# Patient Record
Sex: MALE | Race: WHITE | Employment: OTHER | URBAN - NONMETROPOLITAN AREA
[De-identification: names, ages, dates, MRNs, and addresses within clinical notes are randomized per-mention and may not be internally consistent; named-entity substitution may affect disease eponyms.]

---

## 2017-01-27 ENCOUNTER — OFFICE VISIT (OUTPATIENT)
Dept: ORTHOPEDIC SURGERY | Age: 67
End: 2017-01-27

## 2017-01-27 VITALS — BODY MASS INDEX: 40.63 KG/M2 | WEIGHT: 300 LBS | HEIGHT: 72 IN | RESPIRATION RATE: 16 BRPM

## 2017-01-27 DIAGNOSIS — R52 PAIN: ICD-10-CM

## 2017-01-27 DIAGNOSIS — M75.81 ROTATOR CUFF TENDONITIS, RIGHT: Primary | ICD-10-CM

## 2017-01-27 DIAGNOSIS — S46.911A STRAIN OF AC JOINT, RIGHT, INITIAL ENCOUNTER: ICD-10-CM

## 2017-01-27 PROBLEM — M75.80 ROTATOR CUFF TENDONITIS: Status: ACTIVE | Noted: 2017-01-27

## 2017-01-27 PROCEDURE — 99204 OFFICE O/P NEW MOD 45 MIN: CPT | Performed by: ORTHOPAEDIC SURGERY

## 2017-01-27 PROCEDURE — 20610 DRAIN/INJ JOINT/BURSA W/O US: CPT | Performed by: ORTHOPAEDIC SURGERY

## 2017-01-27 PROCEDURE — 73010 X-RAY EXAM OF SHOULDER BLADE: CPT | Performed by: ORTHOPAEDIC SURGERY

## 2017-01-27 PROCEDURE — 73030 X-RAY EXAM OF SHOULDER: CPT | Performed by: ORTHOPAEDIC SURGERY

## 2017-01-27 RX ORDER — TIZANIDINE 4 MG/1
4 TABLET ORAL 3 TIMES DAILY PRN
COMMUNITY

## 2017-01-27 RX ORDER — TRAZODONE HYDROCHLORIDE 100 MG/1
100 TABLET ORAL NIGHTLY
COMMUNITY

## 2017-01-27 RX ORDER — LEVOTHYROXINE SODIUM 0.15 MG/1
1 TABLET ORAL
COMMUNITY
End: 2017-01-27 | Stop reason: SDUPTHER

## 2017-01-27 RX ORDER — LORAZEPAM 1 MG/1
1 TABLET ORAL
COMMUNITY
End: 2017-01-27 | Stop reason: SDUPTHER

## 2017-01-27 RX ORDER — LOSARTAN POTASSIUM 50 MG/1
50 TABLET ORAL
COMMUNITY
Start: 2017-01-02

## 2017-01-27 RX ORDER — IPRATROPIUM BROMIDE AND ALBUTEROL SULFATE 2.5; .5 MG/3ML; MG/3ML
3 SOLUTION RESPIRATORY (INHALATION)
COMMUNITY
End: 2017-12-11 | Stop reason: SDUPTHER

## 2017-01-27 RX ORDER — MULTIVITAMIN
CAPSULE ORAL
COMMUNITY

## 2017-01-27 RX ORDER — ATORVASTATIN CALCIUM 40 MG/1
40 TABLET, FILM COATED ORAL
COMMUNITY
Start: 2017-01-02 | End: 2017-01-27 | Stop reason: SDUPTHER

## 2017-01-27 RX ORDER — MELOXICAM 15 MG/1
7.5 TABLET ORAL DAILY
COMMUNITY

## 2017-01-27 RX ORDER — OXYCODONE HYDROCHLORIDE 15 MG/1
20 TABLET ORAL
COMMUNITY
End: 2017-01-27 | Stop reason: SDUPTHER

## 2017-01-27 RX ORDER — ALBUTEROL SULFATE 90 UG/1
2 AEROSOL, METERED RESPIRATORY (INHALATION)
COMMUNITY

## 2017-01-27 RX ORDER — GABAPENTIN 600 MG/1
600 TABLET ORAL
Status: ON HOLD | COMMUNITY
End: 2018-05-29

## 2017-01-27 RX ORDER — NITROGLYCERIN 0.4 MG/1
1 TABLET SUBLINGUAL
COMMUNITY

## 2017-01-27 RX ORDER — DULOXETIN HYDROCHLORIDE 60 MG/1
60 CAPSULE, DELAYED RELEASE ORAL DAILY
COMMUNITY
Start: 2016-10-17

## 2017-01-27 RX ORDER — PROCHLORPERAZINE MALEATE 10 MG
10 TABLET ORAL
COMMUNITY
End: 2018-12-23

## 2017-01-27 RX ORDER — OMEPRAZOLE 20 MG/1
20 CAPSULE, DELAYED RELEASE ORAL
COMMUNITY
End: 2017-01-27 | Stop reason: SDUPTHER

## 2017-01-27 RX ORDER — LEVOTHYROXINE SODIUM 0.07 MG/1
75 TABLET ORAL
COMMUNITY

## 2017-01-27 RX ORDER — TRAZODONE HYDROCHLORIDE 50 MG/1
50 TABLET ORAL
COMMUNITY
Start: 2016-10-04 | End: 2017-01-27 | Stop reason: SDUPTHER

## 2017-01-27 RX ORDER — PREGABALIN 100 MG/1
100 CAPSULE ORAL 3 TIMES DAILY
COMMUNITY
End: 2018-12-23

## 2017-01-27 RX ORDER — GLIMEPIRIDE 4 MG/1
4 TABLET ORAL
COMMUNITY
End: 2017-01-27 | Stop reason: SDUPTHER

## 2017-01-27 RX ORDER — CITALOPRAM 20 MG/1
20 TABLET ORAL
COMMUNITY
Start: 2017-01-02 | End: 2017-01-27 | Stop reason: SDUPTHER

## 2017-01-27 RX ORDER — DICLOFENAC SODIUM 75 MG/1
75 TABLET, DELAYED RELEASE ORAL 2 TIMES DAILY
COMMUNITY
Start: 2017-01-02

## 2017-01-27 ASSESSMENT — ENCOUNTER SYMPTOMS
SHORTNESS OF BREATH: 1
CONSTIPATION: 1
PHOTOPHOBIA: 1
DIARRHEA: 1
COUGH: 1
BACK PAIN: 1
ABDOMINAL PAIN: 1
WHEEZING: 1

## 2017-02-02 ENCOUNTER — HOSPITAL ENCOUNTER (OUTPATIENT)
Dept: OCCUPATIONAL THERAPY | Age: 67
Discharge: HOME | End: 2017-02-28
Attending: ORTHOPAEDIC SURGERY | Admitting: ORTHOPAEDIC SURGERY

## 2017-02-08 ASSESSMENT — PAIN DESCRIPTION - PAIN TYPE: TYPE: CHRONIC PAIN

## 2017-02-08 ASSESSMENT — PAIN SCALES - GENERAL: PAINLEVEL_OUTOF10: 5

## 2017-03-01 ENCOUNTER — HOSPITAL ENCOUNTER (OUTPATIENT)
Dept: OCCUPATIONAL THERAPY | Age: 67
Discharge: HOME | End: 2017-03-31
Attending: ORTHOPAEDIC SURGERY | Admitting: ORTHOPAEDIC SURGERY

## 2017-08-07 LAB
AVERAGE GLUCOSE: NORMAL
HBA1C MFR BLD: 9.4 %

## 2017-10-17 ENCOUNTER — HOSPITAL ENCOUNTER (OUTPATIENT)
Dept: GENERAL RADIOLOGY | Age: 67
Discharge: HOME | End: 2017-10-17
Attending: PAIN MEDICINE | Admitting: PAIN MEDICINE

## 2017-10-17 DIAGNOSIS — M54.2 CERVICALGIA: ICD-10-CM

## 2017-12-01 ENCOUNTER — HOSPITAL ENCOUNTER (OUTPATIENT)
Dept: OTHER | Age: 67
Discharge: HOME | End: 2017-12-01
Attending: SKILLED NURSING FACILITY | Admitting: SKILLED NURSING FACILITY

## 2017-12-26 ENCOUNTER — OUTSIDE SERVICES (OUTPATIENT)
Dept: INTERNAL MEDICINE CLINIC | Age: 67
End: 2017-12-26

## 2017-12-26 DIAGNOSIS — E11.8 TYPE 2 DIABETES MELLITUS WITH COMPLICATION, WITH LONG-TERM CURRENT USE OF INSULIN (HCC): ICD-10-CM

## 2017-12-26 DIAGNOSIS — Z96.651 HISTORY OF TOTAL RIGHT KNEE REPLACEMENT: ICD-10-CM

## 2017-12-26 DIAGNOSIS — M17.11 ARTHRITIS OF RIGHT KNEE: Primary | ICD-10-CM

## 2017-12-26 DIAGNOSIS — Z95.0 CARDIAC PACEMAKER IN SITU: ICD-10-CM

## 2017-12-26 DIAGNOSIS — Z89.612 STATUS POST ABOVE KNEE AMPUTATION OF LEFT LOWER EXTREMITY: ICD-10-CM

## 2017-12-26 DIAGNOSIS — Z79.4 TYPE 2 DIABETES MELLITUS WITH COMPLICATION, WITH LONG-TERM CURRENT USE OF INSULIN (HCC): ICD-10-CM

## 2017-12-26 DIAGNOSIS — E11.40 TYPE 2 DIABETES MELLITUS WITH DIABETIC NEUROPATHY, UNSPECIFIED LONG TERM INSULIN USE STATUS: ICD-10-CM

## 2017-12-26 DIAGNOSIS — E66.01 MORBID OBESITY WITH BMI OF 40.0-44.9, ADULT (HCC): ICD-10-CM

## 2017-12-26 LAB
HCT VFR BLD CALC: 37.1 % (ref 42–52)
HEMOGLOBIN: 12.2 GM/DL (ref 13.5–18)
MCH RBC QN AUTO: 29.3 PG (ref 27–31)
MCHC RBC AUTO-ENTMCNC: 32.9 % (ref 32–36)
MCV RBC AUTO: 89 FL (ref 78–100)
PDW BLD-RTO: 13.5 % (ref 11.7–14.9)
PLATELET # BLD: 317 K/CU MM (ref 140–440)
PMV BLD AUTO: 9.7 FL (ref 7.5–11.1)
RBC # BLD: 4.17 M/CU MM (ref 4.6–6.2)
WBC # BLD: 9.9 K/CU MM (ref 4–10.5)

## 2017-12-26 PROCEDURE — 99306 1ST NF CARE HIGH MDM 50: CPT | Performed by: INTERNAL MEDICINE

## 2017-12-27 NOTE — PROGRESS NOTES
History and physical   ___________________________    Tawanna Conklin's  is 1950  SEEN ON 2017 at [facility]  ___________________________    S:   Patient is seen today and discussed with the nurses. 62-year-old white male was admitted to [facility] in [facility] for scheduled right knee arthroplasty on 2017. Patient tolerated the procedure well. He is sent to the [facility] center for rehab. Patient has diabetes mellitus on high-dose insulin, patient denies any hypoglycemia. He states his insulin has been adjusted recently. Patient denies any chest pain. No shortness of breath. No cough or sputum production. No nausea, vomiting or diarrhea. He has pain in the right knee at the surgical site that is controlled with pain medications. Patient has left above-the-knee amputation with prosthetic leg. ROS:  Denies any fever or chills  No sore throat. No earache. No headaches  No chest pain. No shortness of breath. No pedal edema  No cough or sputum production. No wheezing  No nausea, vomiting or abdominal pain. Right knee pain as mentioned above. Chronic numbness of the foot    ALLERGIES:  .  No known allergies      PAST MEDICAL HISTORY:  .  Diabetes mellitus on insulin  . Diabetic neuropathy  . Osteoarthritis right knee and status post TKA  . Hypertension  . Symptomatic bradycardia requiring permanent pacemaker  . Obstructive sleep apnea using CPAP  . Hypothyroidism  . GERD  . Hyperlipidemia  . Anxiety disorder  . Hyperlipidemia  . Depression        PAST SURGICAL HISTORY:  .  Left above-the-knee amputation in   . Laparoscopy cholecystectomy in   . ORIF of left ankle fracture   . Tonsillectomy  . Arthroscopic surgery of left shoulder in   . Right total knee arthroplasty 2070      SOCIAL HISTORY:  .   No history of smoking or ethanol use or illicit drugs Vital signs: /80. Pulse 76. RR 16.  O2 sat 96%. Afebrile  GENERAL:  Alert, oriented, pleasant, in no apparent distress. HEENT:  Conjunctiva pink, no scleral icterus. ENT clear. NECK:  Supple. No jugular venous distention noted. No masses felt,  CARDIOVASCULAR:  Normal S1 and S2  pacemaker on the left side  PULMONARY:  No respiratory distress. No wheezes or rales. ABDOMEN:  Soft and non-tender,no masses  or organomegaly. EXTREMITIES:  Left above-the-knee amputation. Scar on the right knee is healing. Mild edema of the right leg. No calf tenderness. SKIN: Skin is warm and dry. NEUROLOGICAL:  Cranial nerves II through XII are grossly intact. Assessment:  .  Right total knee arthroplasty on 12/20/2017  . Diabetes mellitus on insulin  . Left above-the-knee amputation old. Has prosthesis  . Hypertension  . Status post permanent pacemaker  . Obstructive sleep apnea on CPAP  . Hypothyroidism  . GERD  . Hyperlipidemia  . Anxiety disorder  . Depression  . Diabetic neuropathy  . Insomnia  . Asthma  . Obesity        Plan:  Patient is stable. Moving around in the wheelchair. Pain is in control with pain medications   Patient is also on insulin 70/30 which he takes 3 times a day with meals. Patient goes to diabetologist in [place]. He was taking about 50 units in a.m., 40 units at lunch and 30 units at dinner. The doses were adjusted recently. Check blood sugars regularly  DVT prophylaxis  Medications reviewed continue rest of the medications but  Medication were reviewed  Continue current treatment.

## 2018-01-01 ENCOUNTER — HOSPITAL ENCOUNTER (OUTPATIENT)
Dept: OTHER | Age: 68
Discharge: HOME | End: 2018-01-01
Attending: SKILLED NURSING FACILITY | Admitting: SKILLED NURSING FACILITY

## 2018-01-02 LAB
ALBUMIN SERPL-MCNC: 3.5 GM/DL (ref 3.4–5)
ALP BLD-CCNC: 104 IU/L (ref 40–129)
ALT SERPL-CCNC: 14 U/L (ref 10–40)
ANION GAP SERPL CALCULATED.3IONS-SCNC: 11 MMOL/L (ref 4–16)
AST SERPL-CCNC: 14 IU/L (ref 15–37)
BILIRUB SERPL-MCNC: 0.7 MG/DL (ref 0–1)
BUN BLDV-MCNC: 12 MG/DL (ref 6–23)
CALCIUM SERPL-MCNC: 9.6 MG/DL (ref 8.3–10.6)
CHLORIDE BLD-SCNC: 96 MMOL/L (ref 99–110)
CO2: 28 MMOL/L (ref 21–32)
CREAT SERPL-MCNC: 0.7 MG/DL (ref 0.9–1.3)
GFR AFRICAN AMERICAN: >60 ML/MIN/1.73M2
GFR NON-AFRICAN AMERICAN: >60 ML/MIN/1.73M2
GLUCOSE BLD-MCNC: 209 MG/DL (ref 70–99)
HCT VFR BLD CALC: 38.5 % (ref 42–52)
HEMOGLOBIN: 12.8 GM/DL (ref 13.5–18)
MCH RBC QN AUTO: 29 PG (ref 27–31)
MCHC RBC AUTO-ENTMCNC: 33.2 % (ref 32–36)
MCV RBC AUTO: 87.3 FL (ref 78–100)
PDW BLD-RTO: 13.1 % (ref 11.7–14.9)
PLATELET # BLD: 503 K/CU MM (ref 140–440)
PMV BLD AUTO: 8.9 FL (ref 7.5–11.1)
POTASSIUM SERPL-SCNC: 4.6 MMOL/L (ref 3.5–5.1)
RBC # BLD: 4.41 M/CU MM (ref 4.6–6.2)
SODIUM BLD-SCNC: 135 MMOL/L (ref 135–145)
TOTAL PROTEIN: 6.8 GM/DL (ref 6.4–8.2)
WBC # BLD: 10.6 K/CU MM (ref 4–10.5)

## 2018-01-04 ENCOUNTER — OUTSIDE SERVICES (OUTPATIENT)
Dept: INTERNAL MEDICINE CLINIC | Age: 68
End: 2018-01-04

## 2018-01-04 DIAGNOSIS — E11.40 TYPE 2 DIABETES MELLITUS WITH DIABETIC NEUROPATHY, UNSPECIFIED LONG TERM INSULIN USE STATUS: ICD-10-CM

## 2018-01-04 DIAGNOSIS — F33.9 RECURRENT MAJOR DEPRESSIVE DISORDER, REMISSION STATUS UNSPECIFIED (HCC): ICD-10-CM

## 2018-01-04 DIAGNOSIS — Z89.612 STATUS POST ABOVE KNEE AMPUTATION OF LEFT LOWER EXTREMITY: ICD-10-CM

## 2018-01-04 DIAGNOSIS — Z96.651 STATUS POST TOTAL RIGHT KNEE REPLACEMENT: Primary | ICD-10-CM

## 2018-01-04 PROCEDURE — 99308 SBSQ NF CARE LOW MDM 20: CPT | Performed by: INTERNAL MEDICINE

## 2018-01-09 LAB
HCT VFR BLD CALC: 39.6 % (ref 42–52)
HEMOGLOBIN: 13.1 GM/DL (ref 13.5–18)
MCH RBC QN AUTO: 28.5 PG (ref 27–31)
MCHC RBC AUTO-ENTMCNC: 33.1 % (ref 32–36)
MCV RBC AUTO: 86.3 FL (ref 78–100)
PDW BLD-RTO: 12.9 % (ref 11.7–14.9)
PLATELET # BLD: 522 K/CU MM (ref 140–440)
PMV BLD AUTO: 8.7 FL (ref 7.5–11.1)
RBC # BLD: 4.59 M/CU MM (ref 4.6–6.2)
WBC # BLD: 9.9 K/CU MM (ref 4–10.5)

## 2018-01-09 PROCEDURE — 99308 SBSQ NF CARE LOW MDM 20: CPT | Performed by: INTERNAL MEDICINE

## 2018-01-10 ENCOUNTER — OUTSIDE SERVICES (OUTPATIENT)
Dept: INTERNAL MEDICINE CLINIC | Age: 68
End: 2018-01-10

## 2018-01-10 DIAGNOSIS — J44.9 CHRONIC OBSTRUCTIVE PULMONARY DISEASE, UNSPECIFIED COPD TYPE (HCC): ICD-10-CM

## 2018-01-10 DIAGNOSIS — E11.8 TYPE 2 DIABETES MELLITUS WITH COMPLICATION, WITH LONG-TERM CURRENT USE OF INSULIN (HCC): Primary | ICD-10-CM

## 2018-01-10 DIAGNOSIS — Z79.4 TYPE 2 DIABETES MELLITUS WITH COMPLICATION, WITH LONG-TERM CURRENT USE OF INSULIN (HCC): Primary | ICD-10-CM

## 2018-01-10 DIAGNOSIS — Z89.612 STATUS POST ABOVE KNEE AMPUTATION OF LEFT LOWER EXTREMITY: ICD-10-CM

## 2018-01-10 DIAGNOSIS — Z96.651 STATUS POST TOTAL RIGHT KNEE REPLACEMENT: ICD-10-CM

## 2018-01-11 NOTE — PROGRESS NOTES
Progress notes  ___________________________    Hermelinda Conklin's  is 1950  SEEN ON 2018 at [facility]  ___________________________    S:   Patient is seen today and discussed with the nurses. Pt had  right knee arthroplasty on 2017. Doing rehab. Pain is in control. Patient has left above-the-knee amputation with prosthetic leg. Patient states he got tired after exercise. His BS are running low at times. He is requesting for smaller doses of Insulin. Lowest BS is 56  Patient denies any chest pain. No shortness of breath. No cough or sputum production. ALLERGIES:  .  No known allergies      PAST MEDICAL HISTORY:  .  Diabetes mellitus on insulin  . Diabetic neuropathy  . Osteoarthritis right knee and status post TKA  . Hypertension  . Symptomatic bradycardia requiring permanent pacemaker  . Obstructive sleep apnea using CPAP  . Hypothyroidism  . GERD  . Hyperlipidemia  . Anxiety disorder  . Hyperlipidemia  . Depression        PAST SURGICAL HISTORY:  .  Left above-the-knee amputation in   . Laparoscopy cholecystectomy in   . ORIF of left ankle fracture   . Tonsillectomy  . Arthroscopic surgery of left shoulder in   . Right total knee arthroplasty 2070      SOCIAL HISTORY:  . No history of smoking or ethanol use or illicit drugs        Vital signs: /80, P 61, T 98.5 RR 20, O2 95%  GENERAL:  Alert, oriented, pleasant, in no apparent distress. HEENT:  Conjunctiva pink, no scleral icterus. ENT clear. NECK:  Supple. No jugular venous distention noted. No masses felt,  CARDIOVASCULAR:  Normal S1 and S2  pacemaker on the left side  PULMONARY:  No respiratory distress. No wheezes or rales. ABDOMEN:  Soft and non-tender,no masses  or organomegaly. EXTREMITIES:  Left above-the-knee amputation. Scar on the right knee is healing.  No calf

## 2018-01-16 LAB
HCT VFR BLD CALC: 38.6 % (ref 42–52)
HEMOGLOBIN: 12.9 GM/DL (ref 13.5–18)
MCH RBC QN AUTO: 28.7 PG (ref 27–31)
MCHC RBC AUTO-ENTMCNC: 33.4 % (ref 32–36)
MCV RBC AUTO: 86 FL (ref 78–100)
PDW BLD-RTO: 13.2 % (ref 11.7–14.9)
PLATELET # BLD: 470 K/CU MM (ref 140–440)
PMV BLD AUTO: 9.1 FL (ref 7.5–11.1)
RBC # BLD: 4.49 M/CU MM (ref 4.6–6.2)
WBC # BLD: 9 K/CU MM (ref 4–10.5)

## 2018-05-29 PROBLEM — G93.41 ACUTE METABOLIC ENCEPHALOPATHY: Status: ACTIVE | Noted: 2018-05-29

## 2018-06-19 ENCOUNTER — HOSPITAL ENCOUNTER (OUTPATIENT)
Dept: GENERAL RADIOLOGY | Age: 68
Discharge: HOME | End: 2018-06-19
Attending: NURSE PRACTITIONER | Admitting: NURSE PRACTITIONER

## 2018-06-19 LAB
ALT SERPL-CCNC: 17 U/L (ref 10–40)
ANION GAP SERPL CALCULATED.3IONS-SCNC: 15 MMOL/L (ref 4–16)
BASOPHILS ABSOLUTE: 0.1 K/CU MM
BASOPHILS RELATIVE PERCENT: 0.8 % (ref 0–1)
BUN BLDV-MCNC: 12 MG/DL (ref 6–23)
CALCIUM SERPL-MCNC: 9.4 MG/DL (ref 8.3–10.6)
CHLORIDE BLD-SCNC: 98 MMOL/L (ref 99–110)
CHOLESTEROL: 200 MG/DL
CO2: 23 MMOL/L (ref 21–32)
CREAT SERPL-MCNC: 1.1 MG/DL (ref 0.9–1.3)
DIFFERENTIAL TYPE: ABNORMAL
EOSINOPHILS ABSOLUTE: 0.5 K/CU MM
EOSINOPHILS RELATIVE PERCENT: 6.1 % (ref 0–3)
ESTIMATED AVERAGE GLUCOSE: 232 MG/DL
GFR AFRICAN AMERICAN: >60 ML/MIN/1.73M2
GFR NON-AFRICAN AMERICAN: >60 ML/MIN/1.73M2
GLUCOSE BLD-MCNC: 79 MG/DL (ref 70–99)
HBA1C MFR BLD: 9.7 % (ref 4.2–6.3)
HCT VFR BLD CALC: 46.3 % (ref 42–52)
HDLC SERPL-MCNC: 48 MG/DL
HEMOGLOBIN: 14.9 GM/DL (ref 13.5–18)
IMMATURE NEUTROPHIL %: 0.3 % (ref 0–0.43)
LDL CHOLESTEROL DIRECT: 159 MG/DL
LYMPHOCYTES ABSOLUTE: 1.4 K/CU MM
LYMPHOCYTES RELATIVE PERCENT: 19.3 % (ref 24–44)
MCH RBC QN AUTO: 29.1 PG (ref 27–31)
MCHC RBC AUTO-ENTMCNC: 32.2 % (ref 32–36)
MCV RBC AUTO: 90.4 FL (ref 78–100)
MONOCYTES ABSOLUTE: 0.8 K/CU MM
MONOCYTES RELATIVE PERCENT: 10 % (ref 0–4)
NUCLEATED RBC %: 0 %
PDW BLD-RTO: 14.9 % (ref 11.7–14.9)
PLATELET # BLD: 310 K/CU MM (ref 140–440)
PMV BLD AUTO: 9.8 FL (ref 7.5–11.1)
POTASSIUM SERPL-SCNC: 4.1 MMOL/L (ref 3.5–5.1)
PROSTATE SPECIFIC ANTIGEN: 0.82 NG/ML (ref 0–4)
RBC # BLD: 5.12 M/CU MM (ref 4.6–6.2)
SEGMENTED NEUTROPHILS ABSOLUTE COUNT: 4.8 K/CU MM
SEGMENTED NEUTROPHILS RELATIVE PERCENT: 63.5 % (ref 36–66)
SODIUM BLD-SCNC: 136 MMOL/L (ref 135–145)
TOTAL IMMATURE NEUTOROPHIL: 0.02 K/CU MM
TOTAL NUCLEATED RBC: 0 K/CU MM
TRIGL SERPL-MCNC: 133 MG/DL
TSH HIGH SENSITIVITY: 103.1 UIU/ML (ref 0.27–4.2)
WBC # BLD: 7.5 K/CU MM (ref 4–10.5)

## 2018-08-31 PROBLEM — H25.11 AGE-RELATED NUCLEAR CATARACT OF RIGHT EYE: Status: ACTIVE | Noted: 2018-08-31

## 2018-11-06 ENCOUNTER — HOSPITAL ENCOUNTER (EMERGENCY)
Age: 68
Discharge: HOME | End: 2018-11-06
Attending: EMERGENCY MEDICINE
Payer: MEDICARE

## 2018-11-06 ENCOUNTER — APPOINTMENT (OUTPATIENT)
Dept: GENERAL RADIOLOGY | Age: 68
End: 2018-11-06
Payer: MEDICARE

## 2018-11-06 VITALS
OXYGEN SATURATION: 93 % | SYSTOLIC BLOOD PRESSURE: 133 MMHG | TEMPERATURE: 98.2 F | HEART RATE: 91 BPM | RESPIRATION RATE: 20 BRPM | DIASTOLIC BLOOD PRESSURE: 58 MMHG | WEIGHT: 280 LBS | HEIGHT: 73 IN | BODY MASS INDEX: 37.11 KG/M2

## 2018-11-06 DIAGNOSIS — R07.9 CHEST PAIN, UNSPECIFIED TYPE: ICD-10-CM

## 2018-11-06 DIAGNOSIS — R42 LIGHTHEADEDNESS: Primary | ICD-10-CM

## 2018-11-06 LAB
ALBUMIN SERPL-MCNC: 3.9 GM/DL (ref 3.4–5)
ALP BLD-CCNC: 102 IU/L (ref 40–129)
ALT SERPL-CCNC: 18 U/L (ref 10–40)
ANION GAP SERPL CALCULATED.3IONS-SCNC: 14 MMOL/L (ref 4–16)
AST SERPL-CCNC: 18 IU/L (ref 15–37)
BASOPHILS ABSOLUTE: 0.1 K/CU MM
BASOPHILS RELATIVE PERCENT: 0.8 % (ref 0–1)
BILIRUB SERPL-MCNC: 0.3 MG/DL (ref 0–1)
BUN BLDV-MCNC: 12 MG/DL (ref 6–23)
CALCIUM SERPL-MCNC: 9.7 MG/DL (ref 8.3–10.6)
CHLORIDE BLD-SCNC: 101 MMOL/L (ref 99–110)
CO2: 23 MMOL/L (ref 21–32)
CREAT SERPL-MCNC: 0.8 MG/DL (ref 0.9–1.3)
DIFFERENTIAL TYPE: ABNORMAL
EOSINOPHILS ABSOLUTE: 0.3 K/CU MM
EOSINOPHILS RELATIVE PERCENT: 2.9 % (ref 0–3)
GFR AFRICAN AMERICAN: >60 ML/MIN/1.73M2
GFR NON-AFRICAN AMERICAN: >60 ML/MIN/1.73M2
GLUCOSE BLD-MCNC: 133 MG/DL (ref 70–99)
HCT VFR BLD CALC: 47.7 % (ref 42–52)
HEMOGLOBIN: 15.7 GM/DL (ref 13.5–18)
IMMATURE NEUTROPHIL %: 0.2 % (ref 0–0.43)
INR BLD: 1.03 INDEX
LYMPHOCYTES ABSOLUTE: 2 K/CU MM
LYMPHOCYTES RELATIVE PERCENT: 21.4 % (ref 24–44)
MCH RBC QN AUTO: 28.9 PG (ref 27–31)
MCHC RBC AUTO-ENTMCNC: 32.9 % (ref 32–36)
MCV RBC AUTO: 87.8 FL (ref 78–100)
MONOCYTES ABSOLUTE: 1 K/CU MM
MONOCYTES RELATIVE PERCENT: 10.7 % (ref 0–4)
NUCLEATED RBC %: 0 %
PDW BLD-RTO: 12.9 % (ref 11.7–14.9)
PLATELET # BLD: 305 K/CU MM (ref 140–440)
PMV BLD AUTO: 9.4 FL (ref 7.5–11.1)
POTASSIUM SERPL-SCNC: 3.8 MMOL/L (ref 3.5–5.1)
PRO-BNP: 97.16 PG/ML
PROTHROMBIN TIME: 12 SECONDS (ref 9.12–12.5)
RBC # BLD: 5.43 M/CU MM (ref 4.6–6.2)
SEGMENTED NEUTROPHILS ABSOLUTE COUNT: 5.9 K/CU MM
SEGMENTED NEUTROPHILS RELATIVE PERCENT: 64 % (ref 36–66)
SODIUM BLD-SCNC: 138 MMOL/L (ref 135–145)
TOTAL IMMATURE NEUTOROPHIL: 0.02 K/CU MM
TOTAL NUCLEATED RBC: 0 K/CU MM
TOTAL PROTEIN: 7.2 GM/DL (ref 6.4–8.2)
TROPONIN T: <0.01 NG/ML
TROPONIN T: <0.01 NG/ML
WBC # BLD: 9.2 K/CU MM (ref 4–10.5)

## 2018-11-06 PROCEDURE — 93005 ELECTROCARDIOGRAM TRACING: CPT | Performed by: EMERGENCY MEDICINE

## 2018-11-06 PROCEDURE — 96360 HYDRATION IV INFUSION INIT: CPT

## 2018-11-06 PROCEDURE — 71046 X-RAY EXAM CHEST 2 VIEWS: CPT

## 2018-11-06 PROCEDURE — 84484 ASSAY OF TROPONIN QUANT: CPT

## 2018-11-06 PROCEDURE — 36415 COLL VENOUS BLD VENIPUNCTURE: CPT

## 2018-11-06 PROCEDURE — 85025 COMPLETE CBC W/AUTO DIFF WBC: CPT

## 2018-11-06 PROCEDURE — 83880 ASSAY OF NATRIURETIC PEPTIDE: CPT

## 2018-11-06 PROCEDURE — 99284 EMERGENCY DEPT VISIT MOD MDM: CPT

## 2018-11-06 PROCEDURE — 96361 HYDRATE IV INFUSION ADD-ON: CPT

## 2018-11-06 PROCEDURE — 80053 COMPREHEN METABOLIC PANEL: CPT

## 2018-11-06 PROCEDURE — 2580000003 HC RX 258: Performed by: PHYSICIAN ASSISTANT

## 2018-11-06 PROCEDURE — 85610 PROTHROMBIN TIME: CPT

## 2018-11-06 RX ORDER — 0.9 % SODIUM CHLORIDE 0.9 %
1000 INTRAVENOUS SOLUTION INTRAVENOUS ONCE
Status: COMPLETED | OUTPATIENT
Start: 2018-11-06 | End: 2018-11-06

## 2018-11-06 RX ADMIN — SODIUM CHLORIDE 1000 ML: 900 INJECTION INTRAVENOUS at 20:28

## 2018-11-07 LAB
EKG ATRIAL RATE: 82 BPM
EKG DIAGNOSIS: NORMAL
EKG P AXIS: -11 DEGREES
EKG P-R INTERVAL: 122 MS
EKG Q-T INTERVAL: 452 MS
EKG QRS DURATION: 146 MS
EKG QTC CALCULATION (BAZETT): 528 MS
EKG R AXIS: 177 DEGREES
EKG T AXIS: 73 DEGREES
EKG VENTRICULAR RATE: 82 BPM

## 2018-11-07 PROCEDURE — 93010 ELECTROCARDIOGRAM REPORT: CPT | Performed by: INTERNAL MEDICINE

## 2018-11-07 NOTE — ED PROVIDER NOTES
eMERGENCY dEPARTMENT eNCOUnter        [facility]    Chief Complaint   Patient presents with    Dizziness       HPI    Vinod Sanches is a 79 y.o. male who presents with complaint of hypotension. Patient states he was at home and felt very tired, \"like I was going to fall asleep,\" so he called EMS. His BP was 88F systolic upon their arrival.  He denies any syncope. Denies any headache. Denies room spinning sensation. Denies numbness or tingling. Denies weakness in the arms or legs. He states he has had intermittent chest pain over the last 2 days--once to the left chest and other times to the right chest.  Brief episodes, none at present. Denies shortness of breath, cough, or congestion. He has a pacemaker. Denies abd pain, n/v/d. REVIEW OF SYSTEMS    Constitutional:  Denies fever, denies diaphoresis. HENT:  Denies headache, + dizziness/lightheadedness. Respiratory:  Denies shortness of breath, denies cough. Cardiovascular:  + chest pain. GI:  Denies abdominal pain, denies nausea/vomiting. :  Denies dysuria, frequency, urgency, urinary incontinence. Musculoskeletal:  Denies extremity swelling/pain. Integument:  Denies rashes, lesions. Neurologic:  Denies numbness/tingling. All other systems reviewed and negative.     PAST MEDICAL & SURGICAL HISTORY    Past Medical History:   Diagnosis Date    Acid reflux     Back pain     Blood transfusion     Bone spur     \"Back and Shoulder\"    Cataract     Chronic back pain     [facility]) [facility]     Diabetic eye exam ([facility] 3/1/16    Difficulty breathing     DJD (degenerative joint disease)     Edema     Hyperlipidemia     Hypertension     \"Not on medication at this time\"    Hypothyroid     \"Low Thyroid\"    Neck pain     Osteoarthritis     Shortness of breath     Sinus problem     Sleep apnea     Uses CPAP Machine    Type II or unspecified type diabetes mellitus without mention of metFORMIN (GLUCOPHAGE) 1000 MG tablet Take 1,000 mg by mouth      prochlorperazine (COMPAZINE) 10 MG tablet Take 10 mg by mouth      tiZANidine (ZANAFLEX) 4 MG tablet Take 4 mg by mouth 3 times daily as needed       nitroGLYCERIN (NITROSTAT) 0.4 MG SL tablet Place 1 tablet under the tongue      pregabalin (LYRICA) 100 MG capsule Take 100 mg by mouth 3 times daily. Shelvy Frantz traZODone (DESYREL) 100 MG tablet Take 100 mg by mouth nightly      CPAP Machine MISC by Does not apply route      mometasone-formoterol (DULERA) 200-5 MCG/ACT inhaler Inhale 2 puffs into the lungs every 12 hours      citalopram (CELEXA) 20 MG tablet TAKE ONE TABLET BY MOUTH ONCE DAILY 90 tablet 3    atorvastatin (LIPITOR) 40 MG tablet Take 1 tablet by mouth daily 90 tablet 3    omeprazole (PRILOSEC) 20 MG capsule TAKE ONE CAPSULE BY MOUTH ONCE DAILY, AS DIRECTED 90 capsule 3    glimepiride (AMARYL) 4 MG tablet Take 1 tablet by mouth 2 times daily 180 tablet 3    Melatonin 10 MG TABS Take 1 tablet by mouth nightly      albuterol (PROVENTIL HFA) 108 (90 BASE) MCG/ACT inhaler Inhale 2 puffs into the lungs every 4 hours as needed for Wheezing or Shortness of Breath With spacer (and mask if indicated). Thanks. 1 Inhaler 3    Respiratory Therapy Supplies (FULL KIT NEBULIZER SET) MISC 1 Device by Does not apply route every 4 hours 1 each 0    montelukast (SINGULAIR) 10 MG tablet Take 1 tablet by mouth nightly 30 tablet 3    LORazepam (ATIVAN) 1 MG tablet Take 1 tablet by mouth nightly as needed.  100 tablet 1    ASPIRIN   Take 81 mg by mouth daily In Morning         ALLERGIES    Allergies   Allergen Reactions    No Known Allergies        SOCIAL & FAMILY HISTORY    Social History     Social History    Marital status: Legally      Spouse name: N/A    Number of children: N/A    Years of education: N/A     Social History Main Topics    Smoking status: Former Smoker     Types: Cigars     Quit date: 10/10/2008    Smokeless

## 2018-11-30 ENCOUNTER — HOSPITAL ENCOUNTER (OUTPATIENT)
Age: 68
Discharge: HOME | End: 2018-11-30
Payer: MEDICARE

## 2018-11-30 ENCOUNTER — HOSPITAL ENCOUNTER (OUTPATIENT)
Dept: GENERAL RADIOLOGY | Age: 68
Discharge: HOME | End: 2018-11-30
Payer: MEDICARE

## 2018-11-30 DIAGNOSIS — M46.1 BILATERAL SACROILIITIS (HCC): ICD-10-CM

## 2018-11-30 PROCEDURE — 72110 X-RAY EXAM L-2 SPINE 4/>VWS: CPT

## 2018-11-30 PROCEDURE — 72220 X-RAY EXAM SACRUM TAILBONE: CPT

## 2018-11-30 PROCEDURE — 72202 X-RAY EXAM SI JOINTS 3/> VWS: CPT

## 2018-11-30 PROCEDURE — 72170 X-RAY EXAM OF PELVIS: CPT

## 2018-12-23 ENCOUNTER — HOSPITAL ENCOUNTER (OUTPATIENT)
Age: 68
Setting detail: OBSERVATION
Discharge: HOME HEALTH | End: 2018-12-24
Attending: EMERGENCY MEDICINE | Admitting: HOSPITALIST
Payer: MEDICARE

## 2018-12-23 DIAGNOSIS — I95.9 HYPOTENSION, UNSPECIFIED HYPOTENSION TYPE: ICD-10-CM

## 2018-12-23 DIAGNOSIS — T50.901A ACCIDENTAL DRUG OVERDOSE, INITIAL ENCOUNTER: Primary | ICD-10-CM

## 2018-12-23 PROBLEM — E03.9 HYPOTHYROIDISM: Status: ACTIVE | Noted: 2018-12-23

## 2018-12-23 PROBLEM — E11.9 TYPE 2 DIABETES MELLITUS (HCC): Status: ACTIVE | Noted: 2018-12-23

## 2018-12-23 PROBLEM — J44.9 COPD (CHRONIC OBSTRUCTIVE PULMONARY DISEASE) (HCC): Status: ACTIVE | Noted: 2018-12-23

## 2018-12-23 PROBLEM — E78.5 HYPERLIPIDEMIA: Status: ACTIVE | Noted: 2018-12-23

## 2018-12-23 LAB
ACETAMINOPHEN LEVEL: <5 UG/ML (ref 15–30)
ALBUMIN SERPL-MCNC: 4.4 GM/DL (ref 3.4–5)
ALP BLD-CCNC: 117 IU/L (ref 40–129)
ALT SERPL-CCNC: 22 U/L (ref 10–40)
ANION GAP SERPL CALCULATED.3IONS-SCNC: 12 MMOL/L (ref 4–16)
AST SERPL-CCNC: 22 IU/L (ref 15–37)
BASOPHILS ABSOLUTE: 0.1 K/CU MM
BASOPHILS RELATIVE PERCENT: 0.6 % (ref 0–1)
BILIRUB SERPL-MCNC: 0.3 MG/DL (ref 0–1)
BUN BLDV-MCNC: 19 MG/DL (ref 6–23)
CALCIUM SERPL-MCNC: 10.4 MG/DL (ref 8.3–10.6)
CHLORIDE BLD-SCNC: 100 MMOL/L (ref 99–110)
CO2: 26 MMOL/L (ref 21–32)
CREAT SERPL-MCNC: 0.9 MG/DL (ref 0.9–1.3)
DIFFERENTIAL TYPE: ABNORMAL
EOSINOPHILS ABSOLUTE: 0.4 K/CU MM
EOSINOPHILS RELATIVE PERCENT: 3.1 % (ref 0–3)
GFR AFRICAN AMERICAN: >60 ML/MIN/1.73M2
GFR NON-AFRICAN AMERICAN: >60 ML/MIN/1.73M2
GLUCOSE BLD-MCNC: 150 MG/DL (ref 70–99)
GLUCOSE BLD-MCNC: 298 MG/DL (ref 70–99)
HCT VFR BLD CALC: 46.5 % (ref 42–52)
HEMOGLOBIN: 15.1 GM/DL (ref 13.5–18)
IMMATURE NEUTROPHIL %: 0.3 % (ref 0–0.43)
LYMPHOCYTES ABSOLUTE: 1.7 K/CU MM
LYMPHOCYTES RELATIVE PERCENT: 14.8 % (ref 24–44)
MCH RBC QN AUTO: 28.5 PG (ref 27–31)
MCHC RBC AUTO-ENTMCNC: 32.5 % (ref 32–36)
MCV RBC AUTO: 87.7 FL (ref 78–100)
MONOCYTES ABSOLUTE: 1 K/CU MM
MONOCYTES RELATIVE PERCENT: 8.9 % (ref 0–4)
PDW BLD-RTO: 13.8 % (ref 11.7–14.9)
PLATELET # BLD: 344 K/CU MM (ref 140–440)
PMV BLD AUTO: 9.4 FL (ref 7.5–11.1)
POTASSIUM SERPL-SCNC: 4.5 MMOL/L (ref 3.5–5.1)
RBC # BLD: 5.3 M/CU MM (ref 4.6–6.2)
SEGMENTED NEUTROPHILS ABSOLUTE COUNT: 8.4 K/CU MM
SEGMENTED NEUTROPHILS RELATIVE PERCENT: 72.3 % (ref 36–66)
SODIUM BLD-SCNC: 138 MMOL/L (ref 135–145)
TOTAL IMMATURE NEUTOROPHIL: 0.03 K/CU MM
TOTAL PROTEIN: 7.1 GM/DL (ref 6.4–8.2)
WBC # BLD: 11.6 K/CU MM (ref 4–10.5)

## 2018-12-23 PROCEDURE — 2580000003 HC RX 258: Performed by: NURSE PRACTITIONER

## 2018-12-23 PROCEDURE — G0480 DRUG TEST DEF 1-7 CLASSES: HCPCS

## 2018-12-23 PROCEDURE — 96374 THER/PROPH/DIAG INJ IV PUSH: CPT

## 2018-12-23 PROCEDURE — 96361 HYDRATE IV INFUSION ADD-ON: CPT

## 2018-12-23 PROCEDURE — 99285 EMERGENCY DEPT VISIT HI MDM: CPT

## 2018-12-23 PROCEDURE — 6360000002 HC RX W HCPCS: Performed by: HOSPITALIST

## 2018-12-23 PROCEDURE — 6370000000 HC RX 637 (ALT 250 FOR IP): Performed by: NURSE PRACTITIONER

## 2018-12-23 PROCEDURE — 94660 CPAP INITIATION&MGMT: CPT

## 2018-12-23 PROCEDURE — 93005 ELECTROCARDIOGRAM TRACING: CPT | Performed by: EMERGENCY MEDICINE

## 2018-12-23 PROCEDURE — 82962 GLUCOSE BLOOD TEST: CPT

## 2018-12-23 PROCEDURE — 6360000002 HC RX W HCPCS: Performed by: NURSE PRACTITIONER

## 2018-12-23 PROCEDURE — G0378 HOSPITAL OBSERVATION PER HR: HCPCS

## 2018-12-23 PROCEDURE — 2580000003 HC RX 258: Performed by: HOSPITALIST

## 2018-12-23 PROCEDURE — 96372 THER/PROPH/DIAG INJ SC/IM: CPT

## 2018-12-23 PROCEDURE — 94640 AIRWAY INHALATION TREATMENT: CPT

## 2018-12-23 PROCEDURE — 93010 ELECTROCARDIOGRAM REPORT: CPT | Performed by: INTERNAL MEDICINE

## 2018-12-23 PROCEDURE — 85025 COMPLETE CBC W/AUTO DIFF WBC: CPT

## 2018-12-23 PROCEDURE — 2580000003 HC RX 258: Performed by: EMERGENCY MEDICINE

## 2018-12-23 PROCEDURE — 80053 COMPREHEN METABOLIC PANEL: CPT

## 2018-12-23 RX ORDER — LEVOTHYROXINE SODIUM 0.07 MG/1
75 TABLET ORAL DAILY
Status: DISCONTINUED | OUTPATIENT
Start: 2018-12-24 | End: 2018-12-24 | Stop reason: HOSPADM

## 2018-12-23 RX ORDER — FUROSEMIDE 20 MG/1
20 TABLET ORAL DAILY
COMMUNITY

## 2018-12-23 RX ORDER — ASPIRIN 81 MG/1
81 TABLET, CHEWABLE ORAL DAILY
Status: DISCONTINUED | OUTPATIENT
Start: 2018-12-24 | End: 2018-12-24 | Stop reason: HOSPADM

## 2018-12-23 RX ORDER — DULOXETIN HYDROCHLORIDE 30 MG/1
60 CAPSULE, DELAYED RELEASE ORAL DAILY
Status: DISCONTINUED | OUTPATIENT
Start: 2018-12-24 | End: 2018-12-24 | Stop reason: HOSPADM

## 2018-12-23 RX ORDER — SODIUM CHLORIDE 0.9 % (FLUSH) 0.9 %
10 SYRINGE (ML) INJECTION EVERY 12 HOURS SCHEDULED
Status: DISCONTINUED | OUTPATIENT
Start: 2018-12-23 | End: 2018-12-24 | Stop reason: HOSPADM

## 2018-12-23 RX ORDER — SODIUM CHLORIDE 0.9 % (FLUSH) 0.9 %
10 SYRINGE (ML) INJECTION PRN
Status: DISCONTINUED | OUTPATIENT
Start: 2018-12-23 | End: 2018-12-24 | Stop reason: HOSPADM

## 2018-12-23 RX ORDER — LANOLIN ALCOHOL/MO/W.PET/CERES
9 CREAM (GRAM) TOPICAL NIGHTLY
Status: DISCONTINUED | OUTPATIENT
Start: 2018-12-23 | End: 2018-12-24 | Stop reason: HOSPADM

## 2018-12-23 RX ORDER — LOSARTAN POTASSIUM 50 MG/1
50 TABLET ORAL DAILY
Status: DISCONTINUED | OUTPATIENT
Start: 2018-12-24 | End: 2018-12-24 | Stop reason: HOSPADM

## 2018-12-23 RX ORDER — NITROGLYCERIN 0.4 MG/1
0.4 TABLET SUBLINGUAL EVERY 5 MIN PRN
Status: DISCONTINUED | OUTPATIENT
Start: 2018-12-23 | End: 2018-12-24 | Stop reason: HOSPADM

## 2018-12-23 RX ORDER — CITALOPRAM 20 MG/1
20 TABLET ORAL DAILY
Status: DISCONTINUED | OUTPATIENT
Start: 2018-12-24 | End: 2018-12-24 | Stop reason: HOSPADM

## 2018-12-23 RX ORDER — DEXTROSE MONOHYDRATE 50 MG/ML
100 INJECTION, SOLUTION INTRAVENOUS PRN
Status: DISCONTINUED | OUTPATIENT
Start: 2018-12-23 | End: 2018-12-24 | Stop reason: HOSPADM

## 2018-12-23 RX ORDER — ONDANSETRON 2 MG/ML
4 INJECTION INTRAMUSCULAR; INTRAVENOUS EVERY 6 HOURS PRN
Status: DISCONTINUED | OUTPATIENT
Start: 2018-12-23 | End: 2018-12-24 | Stop reason: HOSPADM

## 2018-12-23 RX ORDER — FUROSEMIDE 20 MG/1
20 TABLET ORAL DAILY
Status: DISCONTINUED | OUTPATIENT
Start: 2018-12-24 | End: 2018-12-24 | Stop reason: HOSPADM

## 2018-12-23 RX ORDER — DEXTROSE MONOHYDRATE 25 G/50ML
12.5 INJECTION, SOLUTION INTRAVENOUS PRN
Status: DISCONTINUED | OUTPATIENT
Start: 2018-12-23 | End: 2018-12-24 | Stop reason: HOSPADM

## 2018-12-23 RX ORDER — IBUPROFEN 800 MG/1
800 TABLET ORAL
Status: DISCONTINUED | OUTPATIENT
Start: 2018-12-23 | End: 2018-12-24 | Stop reason: HOSPADM

## 2018-12-23 RX ORDER — M-VIT,TX,IRON,MINS/CALC/FOLIC 27MG-0.4MG
1 TABLET ORAL DAILY
Status: DISCONTINUED | OUTPATIENT
Start: 2018-12-24 | End: 2018-12-24 | Stop reason: HOSPADM

## 2018-12-23 RX ORDER — LORAZEPAM 1 MG/1
1 TABLET ORAL NIGHTLY PRN
Status: DISCONTINUED | OUTPATIENT
Start: 2018-12-23 | End: 2018-12-24 | Stop reason: HOSPADM

## 2018-12-23 RX ORDER — GLIMEPIRIDE 2 MG/1
4 TABLET ORAL 2 TIMES DAILY WITH MEALS
Status: DISCONTINUED | OUTPATIENT
Start: 2018-12-23 | End: 2018-12-24 | Stop reason: HOSPADM

## 2018-12-23 RX ORDER — MELOXICAM 7.5 MG/1
7.5 TABLET ORAL DAILY
Status: DISCONTINUED | OUTPATIENT
Start: 2018-12-24 | End: 2018-12-24 | Stop reason: HOSPADM

## 2018-12-23 RX ORDER — ALBUTEROL SULFATE 90 UG/1
2 AEROSOL, METERED RESPIRATORY (INHALATION) EVERY 4 HOURS PRN
Status: DISCONTINUED | OUTPATIENT
Start: 2018-12-23 | End: 2018-12-24 | Stop reason: HOSPADM

## 2018-12-23 RX ORDER — TRAZODONE HYDROCHLORIDE 50 MG/1
100 TABLET ORAL NIGHTLY
Status: DISCONTINUED | OUTPATIENT
Start: 2018-12-23 | End: 2018-12-24 | Stop reason: HOSPADM

## 2018-12-23 RX ORDER — 0.9 % SODIUM CHLORIDE 0.9 %
500 INTRAVENOUS SOLUTION INTRAVENOUS ONCE
Status: COMPLETED | OUTPATIENT
Start: 2018-12-23 | End: 2018-12-23

## 2018-12-23 RX ORDER — ATORVASTATIN CALCIUM 40 MG/1
40 TABLET, FILM COATED ORAL DAILY
Status: DISCONTINUED | OUTPATIENT
Start: 2018-12-24 | End: 2018-12-24 | Stop reason: HOSPADM

## 2018-12-23 RX ORDER — TIZANIDINE 4 MG/1
4 TABLET ORAL EVERY 8 HOURS PRN
Status: DISCONTINUED | OUTPATIENT
Start: 2018-12-23 | End: 2018-12-24 | Stop reason: HOSPADM

## 2018-12-23 RX ORDER — IPRATROPIUM BROMIDE AND ALBUTEROL SULFATE 2.5; .5 MG/3ML; MG/3ML
3 SOLUTION RESPIRATORY (INHALATION) 4 TIMES DAILY
Status: DISCONTINUED | OUTPATIENT
Start: 2018-12-23 | End: 2018-12-24 | Stop reason: HOSPADM

## 2018-12-23 RX ORDER — B12/LEVOMEFOLATE CALCIUM/B-6 2-1.13-25
TABLET ORAL DAILY
Status: DISCONTINUED | OUTPATIENT
Start: 2018-12-24 | End: 2018-12-24 | Stop reason: HOSPADM

## 2018-12-23 RX ORDER — SODIUM CHLORIDE 9 MG/ML
INJECTION, SOLUTION INTRAVENOUS CONTINUOUS
Status: DISPENSED | OUTPATIENT
Start: 2018-12-23 | End: 2018-12-24

## 2018-12-23 RX ORDER — NICOTINE POLACRILEX 4 MG
15 LOZENGE BUCCAL PRN
Status: DISCONTINUED | OUTPATIENT
Start: 2018-12-23 | End: 2018-12-24 | Stop reason: HOSPADM

## 2018-12-23 RX ADMIN — GLIMEPIRIDE 4 MG: 2 TABLET ORAL at 20:51

## 2018-12-23 RX ADMIN — IPRATROPIUM BROMIDE AND ALBUTEROL SULFATE 3 ML: .5; 3 SOLUTION RESPIRATORY (INHALATION) at 20:59

## 2018-12-23 RX ADMIN — MELATONIN TAB 3 MG 9 MG: 3 TAB at 20:52

## 2018-12-23 RX ADMIN — TRAZODONE HYDROCHLORIDE 100 MG: 50 TABLET ORAL at 20:52

## 2018-12-23 RX ADMIN — SODIUM CHLORIDE, PRESERVATIVE FREE 10 ML: 5 INJECTION INTRAVENOUS at 20:42

## 2018-12-23 RX ADMIN — ONDANSETRON 4 MG: 2 INJECTION INTRAMUSCULAR; INTRAVENOUS at 22:19

## 2018-12-23 RX ADMIN — INSULIN LISPRO 40 UNITS: 100 INJECTION, SUSPENSION SUBCUTANEOUS at 21:03

## 2018-12-23 RX ADMIN — SODIUM CHLORIDE 500 ML: 9 INJECTION, SOLUTION INTRAVENOUS at 15:20

## 2018-12-23 RX ADMIN — SODIUM CHLORIDE: 9 INJECTION, SOLUTION INTRAVENOUS at 20:42

## 2018-12-23 RX ADMIN — METOPROLOL TARTRATE 25 MG: 25 TABLET ORAL at 20:51

## 2018-12-23 RX ADMIN — IBUPROFEN 800 MG: 800 TABLET ORAL at 20:52

## 2018-12-23 RX ADMIN — ENOXAPARIN SODIUM 40 MG: 40 INJECTION SUBCUTANEOUS at 20:53

## 2018-12-23 ASSESSMENT — PAIN SCALES - GENERAL
PAINLEVEL_OUTOF10: 0

## 2018-12-23 NOTE — H&P
Calixto Akaeze, APRN - CNP    traZODone (DESYREL) tablet 100 mg, 100 mg, Oral, Nightly, Calixto Akaeze, APRN - CNP    sodium chloride flush 0.9 % injection 10 mL, 10 mL, Intravenous, 2 times per day, Calixto Akaeze, APRN - CNP    sodium chloride flush 0.9 % injection 10 mL, 10 mL, Intravenous, PRN, Calixto Akaeze, APRN - CNP    magnesium hydroxide (MILK OF MAGNESIA) 400 MG/5ML suspension 30 mL, 30 mL, Oral, Daily PRN, Calixto Akaeze, APRN - CNP    ondansetron (ZOFRAN) injection 4 mg, 4 mg, Intravenous, Q6H PRN, Calixto Akaeze, APRN - CNP    enoxaparin (LOVENOX) injection 30 mg, 30 mg, Subcutaneous, Daily, Calixto Akaeze, APRN - CNP    Facility-Administered Medications Ordered in Other Encounters:     sodium chloride (PF) 0.9 % injection 10 mL, 10 mL, Intravenous, PRN, Jaiden Collins MD    0.9%  NaCl infusion, , Intravenous, Continuous, Jaiden Collins MD    History of present illness     Chief Complaint: Medication Reaction (states (accidently took medications incorrectly). To room per EMS. States (starting to feel better) States (took all of his medication. If said take 2 x day he took 2 tablets, if 3 x day took 3 tablets. ))      Vinod Sanches is a 79 y.o.  male with medical history that include Hypertension, diabetes, COPD and acid reflux. He  presents with complaint of lightheadedness, hypotension, sleepy and fatigue. These are likely related to patient accidental overdose on his prescription medications. He stated he was confused and had no intention for self harm. He denied,fever, chest pain, shortness of breath or headache. He endorsed cough. Review of Systems     GENERAL:  Denies fever, chills, night sweats, or changes in weight. EYES:  Denies recent visual changes. ENT:  Denies ear pain, hearing loss or tinnitus  RESP:  Denies any cough, dyspnea, or wheezing. CV:  Denies any chest pain with exertion or at rest, palpitations, syncope, or edema.   GI:  Denies any dysphagia, nausea,
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
## 2018-12-24 VITALS
SYSTOLIC BLOOD PRESSURE: 116 MMHG | RESPIRATION RATE: 17 BRPM | DIASTOLIC BLOOD PRESSURE: 53 MMHG | HEART RATE: 67 BPM | WEIGHT: 290 LBS | HEIGHT: 72 IN | BODY MASS INDEX: 39.28 KG/M2 | OXYGEN SATURATION: 94 % | TEMPERATURE: 97 F

## 2018-12-24 PROBLEM — T50.901A ACUTE DRUG OVERDOSE: Status: RESOLVED | Noted: 2018-12-23 | Resolved: 2018-12-24

## 2018-12-24 PROBLEM — I95.9 HYPOTENSION: Status: ACTIVE | Noted: 2018-12-24

## 2018-12-24 PROBLEM — I95.9 HYPOTENSION: Status: RESOLVED | Noted: 2018-12-24 | Resolved: 2018-12-24

## 2018-12-24 PROBLEM — T50.901A ACCIDENTAL DRUG OVERDOSE: Status: RESOLVED | Noted: 2018-12-23 | Resolved: 2018-12-24

## 2018-12-24 LAB
ALBUMIN SERPL-MCNC: 3.7 GM/DL (ref 3.4–5)
ALP BLD-CCNC: 97 IU/L (ref 40–129)
ALT SERPL-CCNC: 20 U/L (ref 10–40)
ANION GAP SERPL CALCULATED.3IONS-SCNC: 11 MMOL/L (ref 4–16)
AST SERPL-CCNC: 18 IU/L (ref 15–37)
BASOPHILS ABSOLUTE: 0.1 K/CU MM
BASOPHILS RELATIVE PERCENT: 0.8 % (ref 0–1)
BILIRUB SERPL-MCNC: 0.3 MG/DL (ref 0–1)
BUN BLDV-MCNC: 24 MG/DL (ref 6–23)
CALCIUM SERPL-MCNC: 9.7 MG/DL (ref 8.3–10.6)
CHLORIDE BLD-SCNC: 103 MMOL/L (ref 99–110)
CO2: 26 MMOL/L (ref 21–32)
CREAT SERPL-MCNC: 1.1 MG/DL (ref 0.9–1.3)
DIFFERENTIAL TYPE: ABNORMAL
EOSINOPHILS ABSOLUTE: 0.5 K/CU MM
EOSINOPHILS RELATIVE PERCENT: 5.8 % (ref 0–3)
GFR AFRICAN AMERICAN: >60 ML/MIN/1.73M2
GFR NON-AFRICAN AMERICAN: >60 ML/MIN/1.73M2
GLUCOSE BLD-MCNC: 118 MG/DL (ref 70–99)
GLUCOSE BLD-MCNC: 131 MG/DL (ref 70–99)
GLUCOSE BLD-MCNC: 72 MG/DL (ref 70–99)
HCT VFR BLD CALC: 45.1 % (ref 42–52)
HEMOGLOBIN: 14.5 GM/DL (ref 13.5–18)
IMMATURE NEUTROPHIL %: 0.2 % (ref 0–0.43)
LACTATE: 0.9 MMOL/L (ref 0.4–2)
LYMPHOCYTES ABSOLUTE: 1.7 K/CU MM
LYMPHOCYTES RELATIVE PERCENT: 18.5 % (ref 24–44)
MCH RBC QN AUTO: 28.3 PG (ref 27–31)
MCHC RBC AUTO-ENTMCNC: 32.2 % (ref 32–36)
MCV RBC AUTO: 87.9 FL (ref 78–100)
MONOCYTES ABSOLUTE: 0.9 K/CU MM
MONOCYTES RELATIVE PERCENT: 10.4 % (ref 0–4)
PDW BLD-RTO: 14 % (ref 11.7–14.9)
PLATELET # BLD: 316 K/CU MM (ref 140–440)
PMV BLD AUTO: 9.3 FL (ref 7.5–11.1)
POTASSIUM SERPL-SCNC: 4.4 MMOL/L (ref 3.5–5.1)
RBC # BLD: 5.13 M/CU MM (ref 4.6–6.2)
SEGMENTED NEUTROPHILS ABSOLUTE COUNT: 5.8 K/CU MM
SEGMENTED NEUTROPHILS RELATIVE PERCENT: 64.3 % (ref 36–66)
SODIUM BLD-SCNC: 140 MMOL/L (ref 135–145)
TOTAL CK: 54 IU/L (ref 38–174)
TOTAL IMMATURE NEUTOROPHIL: 0.02 K/CU MM
TOTAL PROTEIN: 6.5 GM/DL (ref 6.4–8.2)
WBC # BLD: 9.1 K/CU MM (ref 4–10.5)

## 2018-12-24 PROCEDURE — 6370000000 HC RX 637 (ALT 250 FOR IP): Performed by: NURSE PRACTITIONER

## 2018-12-24 PROCEDURE — 6360000002 HC RX W HCPCS: Performed by: HOSPITALIST

## 2018-12-24 PROCEDURE — 85025 COMPLETE CBC W/AUTO DIFF WBC: CPT

## 2018-12-24 PROCEDURE — 83605 ASSAY OF LACTIC ACID: CPT

## 2018-12-24 PROCEDURE — 82550 ASSAY OF CK (CPK): CPT

## 2018-12-24 PROCEDURE — G0378 HOSPITAL OBSERVATION PER HR: HCPCS

## 2018-12-24 PROCEDURE — 2580000003 HC RX 258: Performed by: NURSE PRACTITIONER

## 2018-12-24 PROCEDURE — 80053 COMPREHEN METABOLIC PANEL: CPT

## 2018-12-24 PROCEDURE — 36415 COLL VENOUS BLD VENIPUNCTURE: CPT

## 2018-12-24 PROCEDURE — 94640 AIRWAY INHALATION TREATMENT: CPT

## 2018-12-24 PROCEDURE — 82962 GLUCOSE BLOOD TEST: CPT

## 2018-12-24 RX ADMIN — CITALOPRAM HYDROBROMIDE 20 MG: 20 TABLET ORAL at 08:40

## 2018-12-24 RX ADMIN — LOSARTAN POTASSIUM 50 MG: 50 TABLET ORAL at 08:40

## 2018-12-24 RX ADMIN — IPRATROPIUM BROMIDE AND ALBUTEROL SULFATE 3 ML: .5; 3 SOLUTION RESPIRATORY (INHALATION) at 07:20

## 2018-12-24 RX ADMIN — IBUPROFEN 800 MG: 800 TABLET ORAL at 08:41

## 2018-12-24 RX ADMIN — ASPIRIN 81 MG 81 MG: 81 TABLET ORAL at 08:41

## 2018-12-24 RX ADMIN — MULTIPLE VITAMINS W/ MINERALS TAB 1 TABLET: TAB at 08:40

## 2018-12-24 RX ADMIN — DULOXETINE HYDROCHLORIDE 60 MG: 30 CAPSULE, DELAYED RELEASE ORAL at 08:40

## 2018-12-24 RX ADMIN — MELOXICAM 7.5 MG: 7.5 TABLET ORAL at 08:39

## 2018-12-24 RX ADMIN — FUROSEMIDE 20 MG: 20 TABLET ORAL at 08:44

## 2018-12-24 RX ADMIN — Medication 1 TABLET: at 08:40

## 2018-12-24 RX ADMIN — METFORMIN HYDROCHLORIDE 1000 MG: 500 TABLET ORAL at 08:41

## 2018-12-24 RX ADMIN — GLIMEPIRIDE 4 MG: 2 TABLET ORAL at 08:37

## 2018-12-24 RX ADMIN — LEVOTHYROXINE SODIUM 75 MCG: 75 TABLET ORAL at 08:40

## 2018-12-24 RX ADMIN — ATORVASTATIN CALCIUM 40 MG: 40 TABLET, FILM COATED ORAL at 08:37

## 2018-12-24 RX ADMIN — IPRATROPIUM BROMIDE AND ALBUTEROL SULFATE 3 ML: .5; 3 SOLUTION RESPIRATORY (INHALATION) at 10:49

## 2018-12-24 RX ADMIN — SODIUM CHLORIDE, PRESERVATIVE FREE 10 ML: 5 INJECTION INTRAVENOUS at 08:41

## 2018-12-24 RX ADMIN — METOPROLOL TARTRATE 25 MG: 25 TABLET ORAL at 08:38

## 2018-12-24 RX ADMIN — ENOXAPARIN SODIUM 40 MG: 40 INJECTION SUBCUTANEOUS at 08:41

## 2018-12-24 ASSESSMENT — PAIN SCALES - GENERAL: PAINLEVEL_OUTOF10: 4

## 2018-12-24 NOTE — DISCHARGE SUMMARY
Justyna Hua 1950 [id number]  PCP:  MASOOD Duffy CNP    Admit date: 12/23/2018  Admitting Physician: Nahum Perez MD    Discharge date: 12/24/2018 Discharge Physician: Jaleesa Melendez MD      Reason for admission:   Chief Complaint   Patient presents with    Medication Reaction     states (accidently took medications incorrectly). To room per EMS. States (starting to feel better) States (took all of his medication. If said take 2 x day he took 2 tablets, if 3 x day took 3 tablets.)     Present on Admission:   (Resolved) Acute drug overdose   (Resolved) Accidental drug overdose   Type 2 diabetes mellitus ([facility]   Hypothyroidism   Hyperlipidemia   COPD (chronic obstructive pulmonary disease) ([facility]   Cardiac pacemaker in situ   (Resolved) Hypotension       Discharge Diagnoses Include:  1. Diabetes  2. Hypertension  3. Hypothyroidism  4. Chronic pain  5. Cardiac pacemaker in situ  6. Polypharmacy  7. Morbid obesity    Hospital Course[de-identified] Patient is admitted to the hospital with an episode of hypotension secondary to accidental ingestion of all medications. Patient relaxant to take in more medications of his tizanidine, metoprolol and was prescribed to him, this he had been doing for 2 days and when he was not feeling well he came up to the emergency room where this was realized.   Patient was hydrated overnight he was kept on telemetry which did not show any uneventful episodes, at the time of discharge patient's pressures were normal his a heart rate was normal and arrangements for made for him to have home health with nurse supervision of his medications     BP (!) 116/53   Pulse 67   Temp 97 °F (36.1 °C) (Temporal)   Resp 17   Ht 5' 11.5\" (1.816 m)   Wt 290 lb (131.5 kg)   SpO2 97%   BMI 39.88 kg/m²         Head: atraumatic & normocephalic  Ears: TM's bialterall normal with normal canals  Eyes: without pallor or icterus  Oral cavity: moist mucous membranes with normal dentition  Neck: supple with no lymphadenopathy, JVD down, trachea central  CVS: normal S1S2 with no additional heart sounds  Respi: good air entry in both lung fields with no other adventious breath sounds  GI: soft & non tender, with +ve bowel sounds, no guarding ,rigidity or rebound tenderness  : no inguinal lymphadenopathy, no CVA tenderness  CNS: no focal weakness or sensory deficits peripherally, DTR's equal bilaterally, CN2-12 normal  Skin: no rash, purpurea or eruptions  MS: Left BKA      Procedures:  None significant    Significant Diagnostic Studies at discharge:   As above    Patient Instructions:   Radha Holcomb   Home Medication Instructions CB    Printed on:18 [id number]   Medication Information                      albuterol sulfate  (90 BASE) MCG/ACT inhaler  Inhale 2 puffs into the lungs             ASPIRIN    Take 81 mg by mouth daily In Morning             atorvastatin (LIPITOR) 40 MG tablet  Take 1 tablet by mouth daily             citalopram (CELEXA) 20 MG tablet  TAKE ONE TABLET BY MOUTH ONCE DAILY             CPAP Machine MISC  by Does not apply route             diclofenac (VOLTAREN) 75 MG EC tablet  Take 75 mg by mouth 2 times daily              DULoxetine (CYMBALTA) 60 MG extended release capsule  Take 60 mg by mouth daily              Folinic Acid-Vit B6-Vit B12 (FOLINIC-PLUS) 4-50-2 MG TABS  Take 1 tablet by mouth             furosemide (LASIX) 20 MG tablet  Take 20 mg by mouth daily             glimepiride (AMARYL) 4 MG tablet  Take 1 tablet by mouth 2 times daily             insulin 70-30 (NOVOLIN 70/30) (70-30) 100 UNIT per ML injection vial  SC injection 40 units in morning, 30 units in afternoon, 22 units at night (per patient report)             ipratropium-albuterol (DUONEB) 0.5-2.5 (3) MG/3ML SOLN nebulizer solution  Inhale 3 mLs into the lungs 4 times daily             levothyroxine (SYNTHROID) 75 MCG tablet  Take 75 mcg by mouth

## 2018-12-31 LAB
EKG ATRIAL RATE: 60 BPM
EKG DIAGNOSIS: NORMAL
EKG P AXIS: 83 DEGREES
EKG P-R INTERVAL: 114 MS
EKG Q-T INTERVAL: 466 MS
EKG QRS DURATION: 156 MS
EKG QTC CALCULATION (BAZETT): 466 MS
EKG R AXIS: 165 DEGREES
EKG T AXIS: 85 DEGREES
EKG VENTRICULAR RATE: 60 BPM

## 2019-06-14 ENCOUNTER — HOSPITAL ENCOUNTER (EMERGENCY)
Age: 69
Discharge: HOME | End: 2019-06-14
Attending: EMERGENCY MEDICINE
Payer: MEDICARE

## 2019-06-14 VITALS
HEART RATE: 88 BPM | BODY MASS INDEX: 38.43 KG/M2 | WEIGHT: 290 LBS | SYSTOLIC BLOOD PRESSURE: 116 MMHG | TEMPERATURE: 97.7 F | HEIGHT: 73 IN | RESPIRATION RATE: 18 BRPM | OXYGEN SATURATION: 97 % | DIASTOLIC BLOOD PRESSURE: 63 MMHG

## 2019-06-14 DIAGNOSIS — E16.2 HYPOGLYCEMIA: Primary | ICD-10-CM

## 2019-06-14 LAB
GLUCOSE BLD-MCNC: 165 MG/DL (ref 70–99)
GLUCOSE BLD-MCNC: 75 MG/DL
GLUCOSE BLD-MCNC: 75 MG/DL (ref 70–99)

## 2019-06-14 PROCEDURE — 82962 GLUCOSE BLOOD TEST: CPT

## 2019-06-14 PROCEDURE — 99284 EMERGENCY DEPT VISIT MOD MDM: CPT

## 2019-06-14 NOTE — ED TRIAGE NOTES
Pt POC BS 75, pt reports that's too low for him, orange juice provided.  Pt states he took his insulin this morning and did not eat, he was on his way home to eat when he felt his blood sugar dropping
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
## 2019-06-14 NOTE — ED NOTES
Discharge instructions reviewed with patient. Follow up discussed. Patient denies further questions and verbalizes understanding.  POC BG was 165 prior to discharge per this RN     Margarette Yates RN  06/14/19 4123

## 2019-06-15 NOTE — CARE COORDINATION
LSW was consulted to assist this pt with discharge planning. DARRENW, accompanied w/Naz RAMOS/SHANNEN, spoke to this pt. LSW explained CM role. Pt reports he just wants help to get one vial of his insulin @ [facility]. He needs this to get him through to next month. Pt states he is ready to leave now. Pt does have insurance but he is still responsible for $25.00 per vial and usually uses 4 vials a month. Pt states he is retired from [employer] and receives $3000.00 month. Pt also noted a friend of his informed him to come to ED as he could receive assistance for medications as his friend did. LSW explained each request for assistance is evaluated and not everyone may qualify. LSW did verify that [facility] would be same cost for pt and pt does not qualify for Med Assist because the script has already been filled and is not at the [address] [place], [place] [address] uses. LSW did provid pt information for prescription assistance programs in future, including Med Assist. LSW noted he would need to apply but again d/t income, he may not be elgible. Pt stated \"this was a waste of time\" and asked to be d/c'd. Pt's Nurse was in Stroke Alert and Radhika-RN assisted pt with d/c. LSW did wheel pt to his vehicle as pt does become sob and weighs 290.

## 2019-07-10 ENCOUNTER — HOSPITAL ENCOUNTER (EMERGENCY)
Age: 69
Discharge: ANOTHER HOSPITAL | End: 2019-07-10
Attending: EMERGENCY MEDICINE
Payer: MEDICARE

## 2019-07-10 ENCOUNTER — APPOINTMENT (OUTPATIENT)
Dept: CT IMAGING | Age: 69
End: 2019-07-10
Payer: MEDICARE

## 2019-07-10 VITALS
OXYGEN SATURATION: 94 % | WEIGHT: 299 LBS | HEIGHT: 72 IN | BODY MASS INDEX: 40.5 KG/M2 | TEMPERATURE: 97.9 F | RESPIRATION RATE: 16 BRPM | HEART RATE: 77 BPM | SYSTOLIC BLOOD PRESSURE: 139 MMHG | DIASTOLIC BLOOD PRESSURE: 84 MMHG

## 2019-07-10 DIAGNOSIS — I60.9 SUBARACHNOID HEMORRHAGE (HCC): ICD-10-CM

## 2019-07-10 DIAGNOSIS — S16.1XXA CERVICAL STRAIN, ACUTE, INITIAL ENCOUNTER: ICD-10-CM

## 2019-07-10 DIAGNOSIS — S09.90XA INJURY OF HEAD, INITIAL ENCOUNTER: Primary | ICD-10-CM

## 2019-07-10 PROCEDURE — 72125 CT NECK SPINE W/O DYE: CPT

## 2019-07-10 PROCEDURE — 70450 CT HEAD/BRAIN W/O DYE: CPT

## 2019-07-10 PROCEDURE — 6370000000 HC RX 637 (ALT 250 FOR IP): Performed by: EMERGENCY MEDICINE

## 2019-07-10 PROCEDURE — 6360000002 HC RX W HCPCS: Performed by: EMERGENCY MEDICINE

## 2019-07-10 PROCEDURE — 96372 THER/PROPH/DIAG INJ SC/IM: CPT

## 2019-07-10 PROCEDURE — 99285 EMERGENCY DEPT VISIT HI MDM: CPT

## 2019-07-10 PROCEDURE — 72128 CT CHEST SPINE W/O DYE: CPT

## 2019-07-10 RX ORDER — ONDANSETRON 4 MG/1
4 TABLET, ORALLY DISINTEGRATING ORAL ONCE
Status: COMPLETED | OUTPATIENT
Start: 2019-07-10 | End: 2019-07-10

## 2019-07-10 RX ORDER — MORPHINE SULFATE 10 MG/ML
6 INJECTION, SOLUTION INTRAMUSCULAR; INTRAVENOUS ONCE
Status: COMPLETED | OUTPATIENT
Start: 2019-07-10 | End: 2019-07-10

## 2019-07-10 RX ADMIN — ONDANSETRON 4 MG: 4 TABLET, ORALLY DISINTEGRATING ORAL at 14:09

## 2019-07-10 RX ADMIN — MORPHINE SULFATE 6 MG: 10 INJECTION INTRAVENOUS at 14:08

## 2019-07-10 ASSESSMENT — PAIN DESCRIPTION - ORIENTATION: ORIENTATION: POSTERIOR;RIGHT;LEFT

## 2019-07-10 ASSESSMENT — PAIN SCALES - GENERAL: PAINLEVEL_OUTOF10: 8

## 2019-07-10 ASSESSMENT — PAIN DESCRIPTION - LOCATION: LOCATION: HEAD;NECK;SHOULDER

## 2019-07-10 NOTE — ED NOTES
Dr Charo Plascencia states it is ok for pt to eat.  Meal tray given     Alexis Emanuel RN  07/10/19 7470
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
## 2019-07-10 NOTE — ED NOTES
Tray here. Dr Tri Maier  talking with [facility] radiology regarding this pt.  Holding off on giving pt food tray at this time     Angel Michel RN  07/10/19 1850

## 2019-08-27 ENCOUNTER — HOSPITAL ENCOUNTER (OUTPATIENT)
Age: 69
Discharge: HOME | End: 2019-08-27
Payer: MEDICARE

## 2019-08-27 LAB
ALT SERPL-CCNC: 21 U/L (ref 10–40)
ANION GAP SERPL CALCULATED.3IONS-SCNC: 13 MMOL/L (ref 4–16)
BASOPHILS ABSOLUTE: 0.1 K/CU MM
BASOPHILS RELATIVE PERCENT: 1 % (ref 0–1)
BUN BLDV-MCNC: 15 MG/DL (ref 6–23)
CALCIUM SERPL-MCNC: 10 MG/DL (ref 8.3–10.6)
CHLORIDE BLD-SCNC: 102 MMOL/L (ref 99–110)
CHOLESTEROL, FASTING: 182 MG/DL
CO2: 23 MMOL/L (ref 21–32)
CREAT SERPL-MCNC: 0.9 MG/DL (ref 0.9–1.3)
DIFFERENTIAL TYPE: ABNORMAL
EOSINOPHILS ABSOLUTE: 0.3 K/CU MM
EOSINOPHILS RELATIVE PERCENT: 5.4 % (ref 0–3)
ESTIMATED AVERAGE GLUCOSE: 272 MG/DL
GFR AFRICAN AMERICAN: >60 ML/MIN/1.73M2
GFR NON-AFRICAN AMERICAN: >60 ML/MIN/1.73M2
GLUCOSE BLD-MCNC: 256 MG/DL (ref 70–99)
HBA1C MFR BLD: 11.1 % (ref 4.2–6.3)
HCT VFR BLD CALC: 45.4 % (ref 42–52)
HDLC SERPL-MCNC: 31 MG/DL
HEMOGLOBIN: 14.7 GM/DL (ref 13.5–18)
IMMATURE NEUTROPHIL %: 0.3 % (ref 0–0.43)
LDL CHOLESTEROL DIRECT: 137 MG/DL
LYMPHOCYTES ABSOLUTE: 0.9 K/CU MM
LYMPHOCYTES RELATIVE PERCENT: 14.7 % (ref 24–44)
MCH RBC QN AUTO: 28.8 PG (ref 27–31)
MCHC RBC AUTO-ENTMCNC: 32.4 % (ref 32–36)
MCV RBC AUTO: 89 FL (ref 78–100)
MONOCYTES ABSOLUTE: 0.8 K/CU MM
MONOCYTES RELATIVE PERCENT: 12.1 % (ref 0–4)
NUCLEATED RBC %: 0 %
PDW BLD-RTO: 13.3 % (ref 11.7–14.9)
PLATELET # BLD: 336 K/CU MM (ref 140–440)
PMV BLD AUTO: 10.1 FL (ref 7.5–11.1)
POTASSIUM SERPL-SCNC: 4.4 MMOL/L (ref 3.5–5.1)
PROSTATE SPECIFIC ANTIGEN: 1.24 NG/ML (ref 0–4)
RBC # BLD: 5.1 M/CU MM (ref 4.6–6.2)
SEGMENTED NEUTROPHILS ABSOLUTE COUNT: 4.2 K/CU MM
SEGMENTED NEUTROPHILS RELATIVE PERCENT: 66.5 % (ref 36–66)
SODIUM BLD-SCNC: 138 MMOL/L (ref 135–145)
TOTAL IMMATURE NEUTOROPHIL: 0.02 K/CU MM
TOTAL NUCLEATED RBC: 0 K/CU MM
TRIGLYCERIDE, FASTING: 137 MG/DL
TSH HIGH SENSITIVITY: 1.47 UIU/ML (ref 0.27–4.2)
WBC # BLD: 6.3 K/CU MM (ref 4–10.5)

## 2019-08-27 PROCEDURE — G0103 PSA SCREENING: HCPCS

## 2019-08-27 PROCEDURE — 83036 HEMOGLOBIN GLYCOSYLATED A1C: CPT

## 2019-08-27 PROCEDURE — 80048 BASIC METABOLIC PNL TOTAL CA: CPT

## 2019-08-27 PROCEDURE — 85025 COMPLETE CBC W/AUTO DIFF WBC: CPT

## 2019-08-27 PROCEDURE — 84443 ASSAY THYROID STIM HORMONE: CPT

## 2019-08-27 PROCEDURE — 36415 COLL VENOUS BLD VENIPUNCTURE: CPT

## 2019-08-27 PROCEDURE — 80061 LIPID PANEL: CPT

## 2019-08-27 PROCEDURE — 84460 ALANINE AMINO (ALT) (SGPT): CPT

## 2020-02-13 ENCOUNTER — HOSPITAL ENCOUNTER (OUTPATIENT)
Age: 70
Setting detail: OBSERVATION
Discharge: HOME | End: 2020-02-14
Attending: FAMILY MEDICINE | Admitting: INTERNAL MEDICINE
Payer: MEDICARE

## 2020-02-13 ENCOUNTER — APPOINTMENT (OUTPATIENT)
Dept: GENERAL RADIOLOGY | Age: 70
End: 2020-02-13
Payer: MEDICARE

## 2020-02-13 PROBLEM — R07.9 CHEST PAIN AT REST: Status: ACTIVE | Noted: 2020-02-13

## 2020-02-13 LAB
ALBUMIN SERPL-MCNC: 4.1 GM/DL (ref 3.4–5)
ALP BLD-CCNC: 135 IU/L (ref 40–129)
ALT SERPL-CCNC: 22 U/L (ref 10–40)
ANION GAP SERPL CALCULATED.3IONS-SCNC: 13 MMOL/L (ref 4–16)
AST SERPL-CCNC: 19 IU/L (ref 15–37)
BASOPHILS ABSOLUTE: 0.1 K/CU MM
BASOPHILS RELATIVE PERCENT: 0.7 % (ref 0–1)
BETA-HYDROXYBUTYRATE: 4.3 MG/DL (ref 0–3)
BILIRUB SERPL-MCNC: 0.3 MG/DL (ref 0–1)
BUN BLDV-MCNC: 18 MG/DL (ref 6–23)
CALCIUM SERPL-MCNC: 9.7 MG/DL (ref 8.3–10.6)
CHLORIDE BLD-SCNC: 89 MMOL/L (ref 99–110)
CO2: 27 MMOL/L (ref 21–32)
CREAT SERPL-MCNC: 0.9 MG/DL (ref 0.9–1.3)
DIFFERENTIAL TYPE: ABNORMAL
EOSINOPHILS ABSOLUTE: 0.3 K/CU MM
EOSINOPHILS RELATIVE PERCENT: 3.9 % (ref 0–3)
GFR AFRICAN AMERICAN: >60 ML/MIN/1.73M2
GFR NON-AFRICAN AMERICAN: >60 ML/MIN/1.73M2
GLUCOSE BLD-MCNC: 497 MG/DL (ref 70–99)
HCT VFR BLD CALC: 46.1 % (ref 42–52)
HEMOGLOBIN: 15.1 GM/DL (ref 13.5–18)
IMMATURE NEUTROPHIL %: 0.2 % (ref 0–0.43)
LIPASE: 43 IU/L (ref 13–60)
LYMPHOCYTES ABSOLUTE: 1.5 K/CU MM
LYMPHOCYTES RELATIVE PERCENT: 17.3 % (ref 24–44)
MCH RBC QN AUTO: 28.3 PG (ref 27–31)
MCHC RBC AUTO-ENTMCNC: 32.8 % (ref 32–36)
MCV RBC AUTO: 86.5 FL (ref 78–100)
MONOCYTES ABSOLUTE: 0.7 K/CU MM
MONOCYTES RELATIVE PERCENT: 8.2 % (ref 0–4)
NUCLEATED RBC %: 0 %
PDW BLD-RTO: 13 % (ref 11.7–14.9)
PLATELET # BLD: 290 K/CU MM (ref 140–440)
PMV BLD AUTO: 10.3 FL (ref 7.5–11.1)
POTASSIUM SERPL-SCNC: 4.3 MMOL/L (ref 3.5–5.1)
RBC # BLD: 5.33 M/CU MM (ref 4.6–6.2)
SEGMENTED NEUTROPHILS ABSOLUTE COUNT: 6 K/CU MM
SEGMENTED NEUTROPHILS RELATIVE PERCENT: 69.7 % (ref 36–66)
SODIUM BLD-SCNC: 129 MMOL/L (ref 135–145)
TOTAL IMMATURE NEUTOROPHIL: 0.02 K/CU MM
TOTAL NUCLEATED RBC: 0 K/CU MM
TOTAL PROTEIN: 6.6 GM/DL (ref 6.4–8.2)
TROPONIN T: <0.01 NG/ML
WBC # BLD: 8.5 K/CU MM (ref 4–10.5)

## 2020-02-13 PROCEDURE — 99285 EMERGENCY DEPT VISIT HI MDM: CPT

## 2020-02-13 PROCEDURE — 80053 COMPREHEN METABOLIC PANEL: CPT

## 2020-02-13 PROCEDURE — 82010 KETONE BODYS QUAN: CPT

## 2020-02-13 PROCEDURE — 96361 HYDRATE IV INFUSION ADD-ON: CPT

## 2020-02-13 PROCEDURE — 85025 COMPLETE CBC W/AUTO DIFF WBC: CPT

## 2020-02-13 PROCEDURE — 71045 X-RAY EXAM CHEST 1 VIEW: CPT

## 2020-02-13 PROCEDURE — 84484 ASSAY OF TROPONIN QUANT: CPT

## 2020-02-13 PROCEDURE — 2580000003 HC RX 258: Performed by: FAMILY MEDICINE

## 2020-02-13 PROCEDURE — G0378 HOSPITAL OBSERVATION PER HR: HCPCS

## 2020-02-13 PROCEDURE — 93005 ELECTROCARDIOGRAM TRACING: CPT | Performed by: FAMILY MEDICINE

## 2020-02-13 PROCEDURE — 83690 ASSAY OF LIPASE: CPT

## 2020-02-13 PROCEDURE — 6370000000 HC RX 637 (ALT 250 FOR IP): Performed by: FAMILY MEDICINE

## 2020-02-13 RX ORDER — 0.9 % SODIUM CHLORIDE 0.9 %
2000 INTRAVENOUS SOLUTION INTRAVENOUS ONCE
Status: COMPLETED | OUTPATIENT
Start: 2020-02-13 | End: 2020-02-13

## 2020-02-13 RX ORDER — ASPIRIN 81 MG/1
324 TABLET, CHEWABLE ORAL ONCE
Status: COMPLETED | OUTPATIENT
Start: 2020-02-13 | End: 2020-02-13

## 2020-02-13 RX ADMIN — ASPIRIN 324 MG: 81 TABLET, CHEWABLE ORAL at 22:16

## 2020-02-13 RX ADMIN — NITROGLYCERIN 1 INCH: 20 OINTMENT TOPICAL at 22:17

## 2020-02-13 RX ADMIN — SODIUM CHLORIDE 2000 ML: 9 INJECTION, SOLUTION INTRAVENOUS at 22:12

## 2020-02-13 ASSESSMENT — PAIN SCALES - GENERAL: PAINLEVEL_OUTOF10: 6

## 2020-02-13 ASSESSMENT — PAIN DESCRIPTION - LOCATION: LOCATION: BACK

## 2020-02-13 ASSESSMENT — HEART SCORE: ECG: 1

## 2020-02-14 ENCOUNTER — APPOINTMENT (OUTPATIENT)
Dept: NUCLEAR MEDICINE | Age: 70
End: 2020-02-14
Payer: MEDICARE

## 2020-02-14 VITALS
HEIGHT: 71 IN | WEIGHT: 290.35 LBS | RESPIRATION RATE: 23 BRPM | HEART RATE: 80 BPM | TEMPERATURE: 98 F | DIASTOLIC BLOOD PRESSURE: 70 MMHG | BODY MASS INDEX: 40.65 KG/M2 | SYSTOLIC BLOOD PRESSURE: 136 MMHG | OXYGEN SATURATION: 98 %

## 2020-02-14 LAB
ANION GAP SERPL CALCULATED.3IONS-SCNC: 15 MMOL/L (ref 4–16)
BUN BLDV-MCNC: 17 MG/DL (ref 6–23)
CALCIUM SERPL-MCNC: 9 MG/DL (ref 8.3–10.6)
CHLORIDE BLD-SCNC: 98 MMOL/L (ref 99–110)
CHOLESTEROL: 104 MG/DL
CO2: 24 MMOL/L (ref 21–32)
CREAT SERPL-MCNC: 0.8 MG/DL (ref 0.9–1.3)
ESTIMATED AVERAGE GLUCOSE: 352 MG/DL
GFR AFRICAN AMERICAN: >60 ML/MIN/1.73M2
GFR NON-AFRICAN AMERICAN: >60 ML/MIN/1.73M2
GLUCOSE BLD-MCNC: 200 MG/DL (ref 70–99)
GLUCOSE BLD-MCNC: 227 MG/DL (ref 70–99)
GLUCOSE BLD-MCNC: 283 MG/DL (ref 70–99)
GLUCOSE BLD-MCNC: 361 MG/DL (ref 70–99)
HBA1C MFR BLD: 13.9 % (ref 4.2–6.3)
HCT VFR BLD CALC: 44.8 % (ref 42–52)
HDLC SERPL-MCNC: 27 MG/DL
HEMOGLOBIN: 14.3 GM/DL (ref 13.5–18)
LDL CHOLESTEROL DIRECT: 67 MG/DL
LV EF: 50 %
LV EF: 56 %
LVEF MODALITY: NORMAL
LVEF MODALITY: NORMAL
MCH RBC QN AUTO: 28.1 PG (ref 27–31)
MCHC RBC AUTO-ENTMCNC: 31.9 % (ref 32–36)
MCV RBC AUTO: 88 FL (ref 78–100)
PDW BLD-RTO: 13.2 % (ref 11.7–14.9)
PLATELET # BLD: 266 K/CU MM (ref 140–440)
PMV BLD AUTO: 10.2 FL (ref 7.5–11.1)
POTASSIUM SERPL-SCNC: 4.2 MMOL/L (ref 3.5–5.1)
RBC # BLD: 5.09 M/CU MM (ref 4.6–6.2)
SODIUM BLD-SCNC: 137 MMOL/L (ref 135–145)
TRIGL SERPL-MCNC: 127 MG/DL
TROPONIN T: <0.01 NG/ML
WBC # BLD: 6.7 K/CU MM (ref 4–10.5)

## 2020-02-14 PROCEDURE — 80048 BASIC METABOLIC PNL TOTAL CA: CPT

## 2020-02-14 PROCEDURE — 36415 COLL VENOUS BLD VENIPUNCTURE: CPT

## 2020-02-14 PROCEDURE — 93005 ELECTROCARDIOGRAM TRACING: CPT | Performed by: INTERNAL MEDICINE

## 2020-02-14 PROCEDURE — 93306 TTE W/DOPPLER COMPLETE: CPT

## 2020-02-14 PROCEDURE — G0378 HOSPITAL OBSERVATION PER HR: HCPCS

## 2020-02-14 PROCEDURE — 83721 ASSAY OF BLOOD LIPOPROTEIN: CPT

## 2020-02-14 PROCEDURE — 6370000000 HC RX 637 (ALT 250 FOR IP): Performed by: INTERNAL MEDICINE

## 2020-02-14 PROCEDURE — 3430000000 HC RX DIAGNOSTIC RADIOPHARMACEUTICAL: Performed by: INTERNAL MEDICINE

## 2020-02-14 PROCEDURE — 78452 HT MUSCLE IMAGE SPECT MULT: CPT

## 2020-02-14 PROCEDURE — 85027 COMPLETE CBC AUTOMATED: CPT

## 2020-02-14 PROCEDURE — 80061 LIPID PANEL: CPT

## 2020-02-14 PROCEDURE — 93017 CV STRESS TEST TRACING ONLY: CPT

## 2020-02-14 PROCEDURE — 94761 N-INVAS EAR/PLS OXIMETRY MLT: CPT

## 2020-02-14 PROCEDURE — 83036 HEMOGLOBIN GLYCOSYLATED A1C: CPT

## 2020-02-14 PROCEDURE — 82962 GLUCOSE BLOOD TEST: CPT

## 2020-02-14 PROCEDURE — 84484 ASSAY OF TROPONIN QUANT: CPT

## 2020-02-14 PROCEDURE — A9500 TC99M SESTAMIBI: HCPCS | Performed by: INTERNAL MEDICINE

## 2020-02-14 PROCEDURE — 6360000002 HC RX W HCPCS: Performed by: INTERNAL MEDICINE

## 2020-02-14 PROCEDURE — 96374 THER/PROPH/DIAG INJ IV PUSH: CPT

## 2020-02-14 RX ORDER — LEVOTHYROXINE SODIUM 0.15 MG/1
150 TABLET ORAL
Status: DISCONTINUED | OUTPATIENT
Start: 2020-02-14 | End: 2020-02-14 | Stop reason: HOSPADM

## 2020-02-14 RX ORDER — MELOXICAM 7.5 MG/1
7.5 TABLET ORAL DAILY
Status: DISCONTINUED | OUTPATIENT
Start: 2020-02-14 | End: 2020-02-14 | Stop reason: HOSPADM

## 2020-02-14 RX ORDER — SODIUM CHLORIDE 0.9 % (FLUSH) 0.9 %
10 SYRINGE (ML) INJECTION EVERY 12 HOURS SCHEDULED
Status: DISCONTINUED | OUTPATIENT
Start: 2020-02-14 | End: 2020-02-14 | Stop reason: HOSPADM

## 2020-02-14 RX ORDER — INSULIN GLARGINE 100 [IU]/ML
45 INJECTION, SOLUTION SUBCUTANEOUS DAILY
Status: DISCONTINUED | OUTPATIENT
Start: 2020-02-14 | End: 2020-02-14 | Stop reason: HOSPADM

## 2020-02-14 RX ORDER — DEXTROSE MONOHYDRATE 50 MG/ML
100 INJECTION, SOLUTION INTRAVENOUS PRN
Status: DISCONTINUED | OUTPATIENT
Start: 2020-02-14 | End: 2020-02-14 | Stop reason: HOSPADM

## 2020-02-14 RX ORDER — DEXTROSE MONOHYDRATE 25 G/50ML
12.5 INJECTION, SOLUTION INTRAVENOUS PRN
Status: DISCONTINUED | OUTPATIENT
Start: 2020-02-14 | End: 2020-02-14 | Stop reason: HOSPADM

## 2020-02-14 RX ORDER — ASPIRIN 81 MG/1
81 TABLET, CHEWABLE ORAL DAILY
Status: DISCONTINUED | OUTPATIENT
Start: 2020-02-14 | End: 2020-02-14 | Stop reason: HOSPADM

## 2020-02-14 RX ORDER — ACETAMINOPHEN 325 MG/1
650 TABLET ORAL EVERY 6 HOURS PRN
Status: DISCONTINUED | OUTPATIENT
Start: 2020-02-14 | End: 2020-02-14 | Stop reason: HOSPADM

## 2020-02-14 RX ORDER — FLUOXETINE 10 MG/1
10 CAPSULE ORAL DAILY
Status: DISCONTINUED | OUTPATIENT
Start: 2020-02-14 | End: 2020-02-14 | Stop reason: HOSPADM

## 2020-02-14 RX ORDER — TRAZODONE HYDROCHLORIDE 50 MG/1
150 TABLET ORAL NIGHTLY
Status: DISCONTINUED | OUTPATIENT
Start: 2020-02-14 | End: 2020-02-14 | Stop reason: HOSPADM

## 2020-02-14 RX ORDER — METOPROLOL SUCCINATE 25 MG/1
25 TABLET, EXTENDED RELEASE ORAL DAILY
Qty: 30 TABLET | Refills: 3 | Status: SHIPPED | OUTPATIENT
Start: 2020-02-14

## 2020-02-14 RX ORDER — FUROSEMIDE 20 MG/1
20 TABLET ORAL DAILY
Status: DISCONTINUED | OUTPATIENT
Start: 2020-02-14 | End: 2020-02-14 | Stop reason: HOSPADM

## 2020-02-14 RX ORDER — NICOTINE POLACRILEX 4 MG
15 LOZENGE BUCCAL PRN
Status: DISCONTINUED | OUTPATIENT
Start: 2020-02-14 | End: 2020-02-14 | Stop reason: HOSPADM

## 2020-02-14 RX ORDER — TIZANIDINE 4 MG/1
4 TABLET ORAL 3 TIMES DAILY
Status: DISCONTINUED | OUTPATIENT
Start: 2020-02-14 | End: 2020-02-14 | Stop reason: HOSPADM

## 2020-02-14 RX ORDER — PREGABALIN 75 MG/1
150 CAPSULE ORAL 2 TIMES DAILY
Status: DISCONTINUED | OUTPATIENT
Start: 2020-02-14 | End: 2020-02-14 | Stop reason: HOSPADM

## 2020-02-14 RX ORDER — SODIUM CHLORIDE 0.9 % (FLUSH) 0.9 %
10 SYRINGE (ML) INJECTION PRN
Status: DISCONTINUED | OUTPATIENT
Start: 2020-02-14 | End: 2020-02-14 | Stop reason: HOSPADM

## 2020-02-14 RX ORDER — METOPROLOL SUCCINATE 25 MG/1
25 TABLET, EXTENDED RELEASE ORAL DAILY
Status: DISCONTINUED | OUTPATIENT
Start: 2020-02-14 | End: 2020-02-14 | Stop reason: HOSPADM

## 2020-02-14 RX ORDER — LOSARTAN POTASSIUM 50 MG/1
50 TABLET ORAL DAILY
Status: DISCONTINUED | OUTPATIENT
Start: 2020-02-14 | End: 2020-02-14 | Stop reason: HOSPADM

## 2020-02-14 RX ORDER — ONDANSETRON 2 MG/ML
4 INJECTION INTRAMUSCULAR; INTRAVENOUS EVERY 6 HOURS PRN
Status: DISCONTINUED | OUTPATIENT
Start: 2020-02-14 | End: 2020-02-14 | Stop reason: HOSPADM

## 2020-02-14 RX ORDER — ALBUTEROL SULFATE 90 UG/1
2 AEROSOL, METERED RESPIRATORY (INHALATION) EVERY 4 HOURS PRN
Status: DISCONTINUED | OUTPATIENT
Start: 2020-02-14 | End: 2020-02-14 | Stop reason: HOSPADM

## 2020-02-14 RX ORDER — ATORVASTATIN CALCIUM 40 MG/1
40 TABLET, FILM COATED ORAL NIGHTLY
Status: DISCONTINUED | OUTPATIENT
Start: 2020-02-14 | End: 2020-02-14 | Stop reason: HOSPADM

## 2020-02-14 RX ORDER — PANTOPRAZOLE SODIUM 40 MG/1
40 TABLET, DELAYED RELEASE ORAL
Status: DISCONTINUED | OUTPATIENT
Start: 2020-02-14 | End: 2020-02-14 | Stop reason: HOSPADM

## 2020-02-14 RX ADMIN — METOPROLOL TARTRATE 25 MG: 25 TABLET ORAL at 10:35

## 2020-02-14 RX ADMIN — PANTOPRAZOLE SODIUM 40 MG: 40 TABLET, DELAYED RELEASE ORAL at 10:42

## 2020-02-14 RX ADMIN — INSULIN LISPRO 6 UNITS: 100 INJECTION, SOLUTION INTRAVENOUS; SUBCUTANEOUS at 11:56

## 2020-02-14 RX ADMIN — PREGABALIN 150 MG: 75 CAPSULE ORAL at 01:10

## 2020-02-14 RX ADMIN — LOSARTAN POTASSIUM 50 MG: 50 TABLET ORAL at 10:35

## 2020-02-14 RX ADMIN — MELOXICAM 7.5 MG: 7.5 TABLET ORAL at 10:34

## 2020-02-14 RX ADMIN — Medication 30 MILLICURIE: at 10:47

## 2020-02-14 RX ADMIN — FLUOXETINE 10 MG: 10 CAPSULE ORAL at 10:33

## 2020-02-14 RX ADMIN — TIZANIDINE 4 MG: 4 TABLET ORAL at 10:36

## 2020-02-14 RX ADMIN — REGADENOSON 0.4 MG: 0.08 INJECTION, SOLUTION INTRAVENOUS at 09:00

## 2020-02-14 RX ADMIN — LEVOTHYROXINE SODIUM 150 MCG: 150 TABLET ORAL at 10:42

## 2020-02-14 RX ADMIN — ATORVASTATIN CALCIUM 40 MG: 40 TABLET, FILM COATED ORAL at 01:10

## 2020-02-14 RX ADMIN — TRAZODONE HYDROCHLORIDE 150 MG: 50 TABLET ORAL at 01:10

## 2020-02-14 RX ADMIN — Medication 10 MILLICURIE: at 10:47

## 2020-02-14 RX ADMIN — PREGABALIN 150 MG: 75 CAPSULE ORAL at 10:35

## 2020-02-14 RX ADMIN — FUROSEMIDE 20 MG: 20 TABLET ORAL at 10:34

## 2020-02-14 RX ADMIN — ASPIRIN 81 MG 81 MG: 81 TABLET ORAL at 10:35

## 2020-02-14 RX ADMIN — INSULIN GLARGINE 45 UNITS: 100 INJECTION, SOLUTION SUBCUTANEOUS at 10:36

## 2020-02-14 ASSESSMENT — PAIN SCALES - GENERAL
PAINLEVEL_OUTOF10: 0
PAINLEVEL_OUTOF10: 4

## 2020-02-14 ASSESSMENT — PAIN DESCRIPTION - DESCRIPTORS: DESCRIPTORS: ACHING

## 2020-02-14 ASSESSMENT — PAIN DESCRIPTION - ORIENTATION: ORIENTATION: LOWER

## 2020-02-14 ASSESSMENT — PAIN DESCRIPTION - FREQUENCY: FREQUENCY: INTERMITTENT

## 2020-02-14 ASSESSMENT — PAIN DESCRIPTION - LOCATION: LOCATION: BACK

## 2020-02-14 NOTE — CONSULTS
[id number]  Stress test and echo today  Check pacer [employer]       Pacer check -BIV pacer normal  Last afib on 1/25--PAFIB  Stress test negative
and nontender. Bowel sounds are present. No  hepatosplenomegaly or guarding appreciated. EXTREMITIES:  No cyanosis or clubbing noted. NEUROLOGIC:  Cranial nerves II through XII are grossly intact. He has amputation of the right leg present. The patient's pacer was checked back in September, showed nonsustained  ventricular tachycardia noted. The patient's last stress test was done  in 2016, negative for ischemia, LV function was preserved at that time. LABORATORY DATA:  BUN is 18, creatinine 0.9, troponins are negative,  total cholesterol is 104, LFTs are normal, CBC is within normal range. IMPRESSION:  A 57-year-old male patient who comes to the hospital with  having chest pain present. From a cardiac stand, we will get a stress  test and echo today. We will make further recommendations based on  that. We will get his pacer interrogated also.         Tamanna Morrissey MD    D: 02/14/2020 8:31:44       T: 02/14/2020 10:18:47     [id number]  Job#: [id number]     Doc#: [id number]    CC:
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
## 2020-02-14 NOTE — DISCHARGE SUMMARY
Discharge Summary    Name:  Kika Bull /Age/Sex: 1950  (81 y.o. male)   MRN & CSN:  [id number] & [id number] Admission Date/Time: 2020  8:50 PM   Attending:  Oneil Puente MD Discharging Physician: Nicole Alvarez MD     HPI and Hospital Course:   Kika Bull is a 81 y.o.  male  who presents with Chest Pain    HPI- as per H and [address]  1--Chest pain-resolved, noncardiac as stress test negative and echo with preserved EF  2-Hyponatremia- improved    The patient expressed appropriate understanding of and agreement with the discharge recommendations, medications, and plan.      Consults this admission:  IP CONSULT TO HOSPITALIST  IP CONSULT TO CARDIOLOGY    Discharge Instruction:   Follow up appointments:   Primary care physician:  within 2 weeks    Diet:  General/cardiac/ADA/as tolerated  Activity: {discharge activity: as tolerated  Disposition: Discharged to:   [x]Home, []C, []SNF, []Acute Rehab, []Hospice   Condition on discharge: Stable    Discharge Medications:      Ashish Hua   Home Medication Instructions PSK:[id number]    Printed on:20 3206   Medication Information                      albuterol sulfate  (90 BASE) MCG/ACT inhaler  Inhale 2 puffs into the lungs             ASPIRIN    Take 81 mg by mouth daily In Morning             atorvastatin (LIPITOR) 40 MG tablet  Take 1 tablet by mouth daily             CPAP Machine MISC  by Does not apply route             diclofenac (VOLTAREN) 75 MG EC tablet  Take 75 mg by mouth 2 times daily              DULoxetine (CYMBALTA) 60 MG extended release capsule  Take 60 mg by mouth daily              Folinic Acid-Vit B6-Vit B12 (FOLINIC-PLUS) 4-50-2 MG TABS  Take 1 tablet by mouth             furosemide (LASIX) 20 MG tablet  Take 20 mg by mouth daily             glimepiride (AMARYL) 4 MG tablet  Take 1 tablet by mouth 2 times daily             Insulin Degludec (TRESIBA FLEXTOUCH) 100 UNIT/ML SOPN  Inject 40 Units into the skin nightly             ipratropium-albuterol (DUONEB) 0.5-2.5 (3) MG/3ML SOLN nebulizer solution  Inhale 3 mLs into the lungs 4 times daily             levothyroxine (SYNTHROID) 75 MCG tablet  Take 75 mcg by mouth             LORazepam (ATIVAN) 1 MG tablet  Take 1 tablet by mouth nightly as needed. losartan (COZAAR) 50 MG tablet  Take 50 mg by mouth             Melatonin 10 MG TABS  Take 1 tablet by mouth nightly             meloxicam (MOBIC) 15 MG tablet  Take 7.5 mg by mouth daily              metoprolol succinate (TOPROL XL) 25 MG extended release tablet  Take 1 tablet by mouth daily             metoprolol tartrate (LOPRESSOR) 25 MG tablet  Take 1 tablet by mouth 2 times daily             Multiple Vitamin (MULTIVITAMIN) capsule  Take by mouth             nitroGLYCERIN (NITROSTAT) 0.4 MG SL tablet  Place 1 tablet under the tongue             omeprazole (PRILOSEC) 20 MG capsule  TAKE ONE CAPSULE BY MOUTH ONCE DAILY, AS DIRECTED             Respiratory Therapy Supplies (FULL KIT NEBULIZER SET) MISC  1 Device by Does not apply route every 4 hours             tiZANidine (ZANAFLEX) 4 MG tablet  Take 4 mg by mouth 3 times daily as needed              traZODone (DESYREL) 100 MG tablet  Take 100 mg by mouth nightly                 Objective Findings at Discharge:   /70   Pulse 80   Temp 98 °F (36.7 °C) (Oral)   Resp 23   Ht 5' 11\" (1.803 m)   Wt 290 lb 5.5 oz (131.7 kg)   SpO2 98%   BMI 40.49 kg/m²            PHYSICAL EXAM   GEN Awake male, laying in bed in no apparent distress. Appears given age. EYES Pupils are equally round. No scleral discharge  HENT Atraumatic and symmetric head  NECK No apparent thyromegaly  RESP Symmetric chest movement while on room air. CARDIO/VASC Peripheral pulses equal bilaterally and palpable. No peripheral edema. GI Abdomen is not distended. Rectal exam deferred.  Lopez catheter is not present.   HEME/LYMPH No petechiae or

## 2020-02-14 NOTE — PLAN OF CARE
Problem: Risk for Impaired Skin Integrity  Goal: Tissue integrity - skin and mucous membranes  Description  Structural intactness and normal physiological function of skin and  mucous membranes.   2/14/2020 1006 by Kristin Perez LPN  Outcome: Ongoing  2/14/2020 0103 by David Louis RN  Outcome: Ongoing     Problem: Falls - Risk of:  Goal: Will remain free from falls  Description  Will remain free from falls  2/14/2020 1006 by Kristin Perez LPN  Outcome: Ongoing  2/14/2020 0103 by David Louis RN  Outcome: Ongoing  Goal: Absence of physical injury  Description  Absence of physical injury  2/14/2020 1006 by Kristin Perez LPN  Outcome: Ongoing  2/14/2020 0103 by David Louis RN  Outcome: Ongoing     Problem: Pain:  Goal: Pain level will decrease  Description  Pain level will decrease  2/14/2020 1006 by Kristin Perez LPN  Outcome: Ongoing  2/14/2020 0103 by David Louis RN  Outcome: Ongoing  Goal: Control of acute pain  Description  Control of acute pain  2/14/2020 1006 by Kristin Perez LPN  Outcome: Ongoing  2/14/2020 0103 by David Louis RN  Outcome: Ongoing  Goal: Control of chronic pain  Description  Control of chronic pain  2/14/2020 1006 by Kristin Perez LPN  Outcome: Ongoing  2/14/2020 0103 by David Louis RN  Outcome: Ongoing
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
## 2020-02-15 LAB
EKG ATRIAL RATE: 75 BPM
EKG ATRIAL RATE: 77 BPM
EKG DIAGNOSIS: NORMAL
EKG DIAGNOSIS: NORMAL
EKG P AXIS: 33 DEGREES
EKG P AXIS: 82 DEGREES
EKG P-R INTERVAL: 120 MS
EKG P-R INTERVAL: 136 MS
EKG Q-T INTERVAL: 420 MS
EKG Q-T INTERVAL: 428 MS
EKG QRS DURATION: 144 MS
EKG QRS DURATION: 156 MS
EKG QTC CALCULATION (BAZETT): 475 MS
EKG QTC CALCULATION (BAZETT): 477 MS
EKG R AXIS: 139 DEGREES
EKG R AXIS: 141 DEGREES
EKG T AXIS: 50 DEGREES
EKG T AXIS: 55 DEGREES
EKG VENTRICULAR RATE: 75 BPM
EKG VENTRICULAR RATE: 77 BPM

## 2020-02-15 PROCEDURE — 93010 ELECTROCARDIOGRAM REPORT: CPT | Performed by: INTERNAL MEDICINE

## 2020-05-26 ENCOUNTER — HOSPITAL ENCOUNTER (OUTPATIENT)
Dept: CT IMAGING | Age: 70
Discharge: HOME | End: 2020-05-26
Payer: MEDICARE

## 2020-05-26 PROCEDURE — 71250 CT THORAX DX C-: CPT

## 2020-08-24 ENCOUNTER — HOSPITAL ENCOUNTER (OUTPATIENT)
Dept: MAMMOGRAPHY | Age: 70
Discharge: HOME | End: 2020-08-24
Payer: MEDICARE

## 2020-08-24 PROCEDURE — 77080 DXA BONE DENSITY AXIAL: CPT

## 2020-10-06 ENCOUNTER — APPOINTMENT (OUTPATIENT)
Dept: CT IMAGING | Age: 70
DRG: 871 | End: 2020-10-06
Payer: MEDICARE

## 2020-10-06 ENCOUNTER — HOSPITAL ENCOUNTER (INPATIENT)
Age: 70
LOS: 6 days | Discharge: NURSING, REHAB OR LONG-TERM CARE | DRG: 871 | End: 2020-10-12
Attending: EMERGENCY MEDICINE | Admitting: STUDENT IN AN ORGANIZED HEALTH CARE EDUCATION/TRAINING PROGRAM
Payer: MEDICARE

## 2020-10-06 PROBLEM — E11.10 DKA, TYPE 2, NOT AT GOAL (HCC): Status: ACTIVE | Noted: 2020-10-06

## 2020-10-06 LAB
ALBUMIN SERPL-MCNC: 3.5 GM/DL (ref 3.4–5)
ALBUMIN SERPL-MCNC: 3.8 GM/DL (ref 3.4–5)
ALP BLD-CCNC: 129 IU/L (ref 40–128)
ALP BLD-CCNC: 135 IU/L (ref 40–128)
ALT SERPL-CCNC: 15 U/L (ref 10–40)
ALT SERPL-CCNC: 15 U/L (ref 10–40)
AMPHETAMINES: NEGATIVE
ANION GAP SERPL CALCULATED.3IONS-SCNC: 38 MMOL/L (ref 4–16)
ANION GAP SERPL CALCULATED.3IONS-SCNC: 41 MMOL/L (ref 4–16)
AST SERPL-CCNC: 11 IU/L (ref 15–37)
AST SERPL-CCNC: 13 IU/L (ref 15–37)
BACTERIA: NEGATIVE /HPF
BANDED NEUTROPHILS ABSOLUTE COUNT: 3.78 K/CU MM
BANDED NEUTROPHILS RELATIVE PERCENT: 14 % (ref 5–11)
BARBITURATE SCREEN URINE: NEGATIVE
BASE EXCESS: 29 (ref 0–3.3)
BENZODIAZEPINE SCREEN, URINE: NEGATIVE
BETA-HYDROXYBUTYRATE: >20.8 MG/DL (ref 0–3)
BILIRUB SERPL-MCNC: 0.4 MG/DL (ref 0–1)
BILIRUB SERPL-MCNC: 0.4 MG/DL (ref 0–1)
BILIRUBIN URINE: NEGATIVE MG/DL
BLOOD, URINE: ABNORMAL
BUN BLDV-MCNC: 42 MG/DL (ref 6–23)
BUN BLDV-MCNC: 44 MG/DL (ref 6–23)
CALCIUM SERPL-MCNC: 10.2 MG/DL (ref 8.3–10.6)
CALCIUM SERPL-MCNC: 10.6 MG/DL (ref 8.3–10.6)
CANNABINOID SCREEN URINE: NEGATIVE
CHLORIDE BLD-SCNC: 86 MMOL/L (ref 99–110)
CHLORIDE BLD-SCNC: 94 MMOL/L (ref 99–110)
CHP ED QC CHECK: NORMAL
CHP ED QC CHECK: YES
CLARITY: CLEAR
CO2: 5 MMOL/L (ref 21–32)
CO2: 5 MMOL/L (ref 21–32)
COCAINE METABOLITE: NEGATIVE
COLOR: YELLOW
COMMENT: ABNORMAL
CREAT SERPL-MCNC: 1.4 MG/DL (ref 0.9–1.3)
CREAT SERPL-MCNC: 1.6 MG/DL (ref 0.9–1.3)
DIFFERENTIAL TYPE: ABNORMAL
GFR AFRICAN AMERICAN: 52 ML/MIN/1.73M2
GFR AFRICAN AMERICAN: >60 ML/MIN/1.73M2
GFR NON-AFRICAN AMERICAN: 43 ML/MIN/1.73M2
GFR NON-AFRICAN AMERICAN: 50 ML/MIN/1.73M2
GLUCOSE BLD-MCNC: 436 MG/DL (ref 70–99)
GLUCOSE BLD-MCNC: 493 MG/DL (ref 70–99)
GLUCOSE BLD-MCNC: 683 MG/DL (ref 70–99)
GLUCOSE BLD-MCNC: 881 MG/DL (ref 70–99)
GLUCOSE BLD-MCNC: >600 MG/DL (ref 70–99)
GLUCOSE BLD-MCNC: >600 MG/DL (ref 70–99)
GLUCOSE BLD-MCNC: NORMAL MG/DL
GLUCOSE BLD-MCNC: NORMAL MG/DL
GLUCOSE, URINE: >500 MG/DL
GRANULAR CASTS: 1 /LPF
HCO3 VENOUS: 3.9 MMOL/L (ref 19–25)
HCT VFR BLD CALC: 59.4 % (ref 42–52)
HEMOGLOBIN: 18.3 GM/DL (ref 13.5–18)
HYALINE CASTS: 0 /LPF
INR BLD: 0.98 INDEX
KETONES, URINE: ABNORMAL MG/DL
LACTATE: 4.5 MMOL/L (ref 0.4–2)
LACTATE: 6.7 MMOL/L (ref 0.4–2)
LEUKOCYTE ESTERASE, URINE: NEGATIVE
LYMPHOCYTES ABSOLUTE: 0.3 K/CU MM
LYMPHOCYTES RELATIVE PERCENT: 1 % (ref 24–44)
MCH RBC QN AUTO: 29.1 PG (ref 27–31)
MCHC RBC AUTO-ENTMCNC: 30.8 % (ref 32–36)
MCV RBC AUTO: 94.4 FL (ref 78–100)
METAMYELOCYTES ABSOLUTE COUNT: 0.27 K/CU MM
METAMYELOCYTES PERCENT: 1 %
MONOCYTES ABSOLUTE: 1.9 K/CU MM
MONOCYTES RELATIVE PERCENT: 7 % (ref 0–4)
MUCUS: ABNORMAL HPF
MYELOCYTE PERCENT: 2 %
MYELOCYTES ABSOLUTE COUNT: 0.54 K/CU MM
NITRITE URINE, QUANTITATIVE: NEGATIVE
O2 SAT, VEN: 92.3 % (ref 50–70)
OPIATES, URINE: NEGATIVE
OXYCODONE: NEGATIVE
PCO2, VEN: 22 MMHG (ref 38–52)
PDW BLD-RTO: 13.3 % (ref 11.7–14.9)
PH VENOUS: 6.86 (ref 7.32–7.42)
PH, URINE: 5 (ref 5–8)
PHENCYCLIDINE, URINE: NEGATIVE
PLATELET # BLD: 565 K/CU MM (ref 140–440)
PMV BLD AUTO: 9.8 FL (ref 7.5–11.1)
PO2, VEN: 89 MMHG (ref 28–48)
POTASSIUM SERPL-SCNC: 5.6 MMOL/L (ref 3.5–5.1)
POTASSIUM SERPL-SCNC: 6.2 MMOL/L (ref 3.5–5.1)
PROTEIN UA: 30 MG/DL
PROTHROMBIN TIME: 11.8 SECONDS (ref 11.7–14.5)
RBC # BLD: 6.29 M/CU MM (ref 4.6–6.2)
RBC # BLD: ABNORMAL 10*6/UL
RBC URINE: <1 /HPF (ref 0–3)
SEGMENTED NEUTROPHILS ABSOLUTE COUNT: 20.2 K/CU MM
SEGMENTED NEUTROPHILS RELATIVE PERCENT: 75 % (ref 36–66)
SODIUM BLD-SCNC: 132 MMOL/L (ref 135–145)
SODIUM BLD-SCNC: 137 MMOL/L (ref 135–145)
SPECIFIC GRAVITY UA: 1.02 (ref 1–1.03)
TOTAL PROTEIN: 7.4 GM/DL (ref 6.4–8.2)
TOTAL PROTEIN: 7.9 GM/DL (ref 6.4–8.2)
TRICHOMONAS: ABNORMAL /HPF
TROPONIN T: 0.01 NG/ML
TSH HIGH SENSITIVITY: 3.49 UIU/ML (ref 0.27–4.2)
UROBILINOGEN, URINE: NORMAL MG/DL (ref 0.2–1)
WBC # BLD: 27 K/CU MM (ref 4–10.5)
WBC UA: <1 /HPF (ref 0–2)

## 2020-10-06 PROCEDURE — 2580000003 HC RX 258: Performed by: INTERNAL MEDICINE

## 2020-10-06 PROCEDURE — 85007 BL SMEAR W/DIFF WBC COUNT: CPT

## 2020-10-06 PROCEDURE — 2500000003 HC RX 250 WO HCPCS: Performed by: EMERGENCY MEDICINE

## 2020-10-06 PROCEDURE — 6360000002 HC RX W HCPCS: Performed by: NURSE PRACTITIONER

## 2020-10-06 PROCEDURE — 2000000000 HC ICU R&B

## 2020-10-06 PROCEDURE — 6370000000 HC RX 637 (ALT 250 FOR IP): Performed by: EMERGENCY MEDICINE

## 2020-10-06 PROCEDURE — 84484 ASSAY OF TROPONIN QUANT: CPT

## 2020-10-06 PROCEDURE — 85610 PROTHROMBIN TIME: CPT

## 2020-10-06 PROCEDURE — 96360 HYDRATION IV INFUSION INIT: CPT

## 2020-10-06 PROCEDURE — 82962 GLUCOSE BLOOD TEST: CPT

## 2020-10-06 PROCEDURE — 83605 ASSAY OF LACTIC ACID: CPT

## 2020-10-06 PROCEDURE — 2580000003 HC RX 258: Performed by: EMERGENCY MEDICINE

## 2020-10-06 PROCEDURE — 99291 CRITICAL CARE FIRST HOUR: CPT

## 2020-10-06 PROCEDURE — 6370000000 HC RX 637 (ALT 250 FOR IP): Performed by: NURSE PRACTITIONER

## 2020-10-06 PROCEDURE — 81001 URINALYSIS AUTO W/SCOPE: CPT

## 2020-10-06 PROCEDURE — 93005 ELECTROCARDIOGRAM TRACING: CPT | Performed by: EMERGENCY MEDICINE

## 2020-10-06 PROCEDURE — 80053 COMPREHEN METABOLIC PANEL: CPT

## 2020-10-06 PROCEDURE — 71275 CT ANGIOGRAPHY CHEST: CPT

## 2020-10-06 PROCEDURE — 74177 CT ABD & PELVIS W/CONTRAST: CPT

## 2020-10-06 PROCEDURE — 84100 ASSAY OF PHOSPHORUS: CPT

## 2020-10-06 PROCEDURE — 82010 KETONE BODYS QUAN: CPT

## 2020-10-06 PROCEDURE — 2580000003 HC RX 258: Performed by: NURSE PRACTITIONER

## 2020-10-06 PROCEDURE — 6370000000 HC RX 637 (ALT 250 FOR IP): Performed by: INTERNAL MEDICINE

## 2020-10-06 PROCEDURE — 85027 COMPLETE CBC AUTOMATED: CPT

## 2020-10-06 PROCEDURE — 70450 CT HEAD/BRAIN W/O DYE: CPT

## 2020-10-06 PROCEDURE — 80307 DRUG TEST PRSMV CHEM ANLYZR: CPT

## 2020-10-06 PROCEDURE — 82803 BLOOD GASES ANY COMBINATION: CPT

## 2020-10-06 PROCEDURE — 36415 COLL VENOUS BLD VENIPUNCTURE: CPT

## 2020-10-06 PROCEDURE — 94761 N-INVAS EAR/PLS OXIMETRY MLT: CPT

## 2020-10-06 PROCEDURE — 6360000004 HC RX CONTRAST MEDICATION: Performed by: EMERGENCY MEDICINE

## 2020-10-06 PROCEDURE — 80048 BASIC METABOLIC PNL TOTAL CA: CPT

## 2020-10-06 PROCEDURE — 82805 BLOOD GASES W/O2 SATURATION: CPT

## 2020-10-06 PROCEDURE — 84443 ASSAY THYROID STIM HORMONE: CPT

## 2020-10-06 PROCEDURE — 83735 ASSAY OF MAGNESIUM: CPT

## 2020-10-06 RX ORDER — HEPARIN SODIUM 5000 [USP'U]/ML
5000 INJECTION, SOLUTION INTRAVENOUS; SUBCUTANEOUS EVERY 8 HOURS SCHEDULED
Status: DISCONTINUED | OUTPATIENT
Start: 2020-10-06 | End: 2020-10-12 | Stop reason: HOSPADM

## 2020-10-06 RX ORDER — ASPIRIN 81 MG/1
324 TABLET, CHEWABLE ORAL ONCE
Status: DISCONTINUED | OUTPATIENT
Start: 2020-10-06 | End: 2020-10-07

## 2020-10-06 RX ORDER — METOPROLOL SUCCINATE 25 MG/1
25 TABLET, EXTENDED RELEASE ORAL DAILY
COMMUNITY

## 2020-10-06 RX ORDER — METOPROLOL SUCCINATE 25 MG/1
25 TABLET, EXTENDED RELEASE ORAL DAILY
Status: DISCONTINUED | OUTPATIENT
Start: 2020-10-07 | End: 2020-10-07

## 2020-10-06 RX ORDER — POTASSIUM CHLORIDE 7.45 MG/ML
10 INJECTION INTRAVENOUS PRN
Status: DISCONTINUED | OUTPATIENT
Start: 2020-10-06 | End: 2020-10-12 | Stop reason: HOSPADM

## 2020-10-06 RX ORDER — PREGABALIN 150 MG/1
150 CAPSULE ORAL 2 TIMES DAILY
COMMUNITY

## 2020-10-06 RX ORDER — DEXTROSE MONOHYDRATE 25 G/50ML
12.5 INJECTION, SOLUTION INTRAVENOUS PRN
Status: DISCONTINUED | OUTPATIENT
Start: 2020-10-06 | End: 2020-10-06 | Stop reason: SDUPTHER

## 2020-10-06 RX ORDER — LEVOTHYROXINE SODIUM 175 UG/1
175 TABLET ORAL DAILY
COMMUNITY

## 2020-10-06 RX ORDER — DEXTROSE AND SODIUM CHLORIDE 5; .45 G/100ML; G/100ML
INJECTION, SOLUTION INTRAVENOUS CONTINUOUS PRN
Status: DISCONTINUED | OUTPATIENT
Start: 2020-10-06 | End: 2020-10-12 | Stop reason: HOSPADM

## 2020-10-06 RX ORDER — FUROSEMIDE 20 MG/1
20 TABLET ORAL DAILY
Status: ON HOLD | COMMUNITY
End: 2020-10-20 | Stop reason: HOSPADM

## 2020-10-06 RX ORDER — 0.9 % SODIUM CHLORIDE 0.9 %
1000 INTRAVENOUS SOLUTION INTRAVENOUS ONCE
Status: COMPLETED | OUTPATIENT
Start: 2020-10-06 | End: 2020-10-06

## 2020-10-06 RX ORDER — LOSARTAN POTASSIUM 50 MG/1
50 TABLET ORAL DAILY
Status: DISCONTINUED | OUTPATIENT
Start: 2020-10-07 | End: 2020-10-08

## 2020-10-06 RX ORDER — DEXTROSE MONOHYDRATE 25 G/50ML
12.5 INJECTION, SOLUTION INTRAVENOUS PRN
Status: DISCONTINUED | OUTPATIENT
Start: 2020-10-06 | End: 2020-10-12 | Stop reason: HOSPADM

## 2020-10-06 RX ORDER — LOSARTAN POTASSIUM 50 MG/1
50 TABLET ORAL DAILY
COMMUNITY

## 2020-10-06 RX ORDER — NICOTINE POLACRILEX 4 MG
15 LOZENGE BUCCAL PRN
Status: DISCONTINUED | OUTPATIENT
Start: 2020-10-06 | End: 2020-10-12 | Stop reason: HOSPADM

## 2020-10-06 RX ORDER — SODIUM CHLORIDE 450 MG/100ML
INJECTION, SOLUTION INTRAVENOUS CONTINUOUS
Status: DISCONTINUED | OUTPATIENT
Start: 2020-10-06 | End: 2020-10-07

## 2020-10-06 RX ORDER — MAGNESIUM SULFATE 1 G/100ML
1 INJECTION INTRAVENOUS PRN
Status: DISCONTINUED | OUTPATIENT
Start: 2020-10-06 | End: 2020-10-12 | Stop reason: HOSPADM

## 2020-10-06 RX ORDER — DEXTROSE MONOHYDRATE 50 MG/ML
100 INJECTION, SOLUTION INTRAVENOUS PRN
Status: DISCONTINUED | OUTPATIENT
Start: 2020-10-06 | End: 2020-10-12 | Stop reason: HOSPADM

## 2020-10-06 RX ORDER — TIZANIDINE 4 MG/1
4 TABLET ORAL EVERY 8 HOURS PRN
COMMUNITY

## 2020-10-06 RX ORDER — PANTOPRAZOLE SODIUM 40 MG/10ML
40 INJECTION, POWDER, LYOPHILIZED, FOR SOLUTION INTRAVENOUS DAILY
Status: DISCONTINUED | OUTPATIENT
Start: 2020-10-07 | End: 2020-10-12 | Stop reason: HOSPADM

## 2020-10-06 RX ORDER — 0.9 % SODIUM CHLORIDE 0.9 %
30 INTRAVENOUS SOLUTION INTRAVENOUS ONCE
Status: COMPLETED | OUTPATIENT
Start: 2020-10-06 | End: 2020-10-06

## 2020-10-06 RX ORDER — 0.9 % SODIUM CHLORIDE 0.9 %
10 VIAL (ML) INJECTION
Status: COMPLETED | OUTPATIENT
Start: 2020-10-06 | End: 2020-10-06

## 2020-10-06 RX ADMIN — IOPAMIDOL 75 ML: 755 INJECTION, SOLUTION INTRAVENOUS at 17:04

## 2020-10-06 RX ADMIN — SODIUM CHLORIDE 11.3 UNITS/HR: 9 INJECTION, SOLUTION INTRAVENOUS at 21:37

## 2020-10-06 RX ADMIN — Medication 10 ML: at 17:04

## 2020-10-06 RX ADMIN — SODIUM BICARBONATE: 84 INJECTION, SOLUTION INTRAVENOUS at 17:12

## 2020-10-06 RX ADMIN — SODIUM CHLORIDE: 4.5 INJECTION, SOLUTION INTRAVENOUS at 22:01

## 2020-10-06 RX ADMIN — SODIUM CHLORIDE 0.1 UNITS/KG/HR: 9 INJECTION, SOLUTION INTRAVENOUS at 17:24

## 2020-10-06 RX ADMIN — SODIUM CHLORIDE 1000 ML: 9 INJECTION, SOLUTION INTRAVENOUS at 17:06

## 2020-10-06 RX ADMIN — SODIUM CHLORIDE 6 UNITS/HR: 9 INJECTION, SOLUTION INTRAVENOUS at 23:02

## 2020-10-06 RX ADMIN — HEPARIN SODIUM 5000 UNITS: 5000 INJECTION INTRAVENOUS; SUBCUTANEOUS at 22:06

## 2020-10-06 RX ADMIN — SODIUM CHLORIDE 1000 ML: 9 INJECTION, SOLUTION INTRAVENOUS at 15:33

## 2020-10-06 ASSESSMENT — PAIN SCALES - WONG BAKER
WONGBAKER_NUMERICALRESPONSE: 0

## 2020-10-06 NOTE — ED PROVIDER NOTES
Emergency Department Encounter    Patient: Clarissa Puente  MRN: [id number]  : 1950  Date of Evaluation: 10/6/2020  ED Provider:  Joleen Colon    Triage Chief Complaint:   Other (unresponsive)      [place]:  Clarissa Puente is a 78 y.o. male that presents to the emergency department for evaluation of unresponsiveness. Patient presents via EMS and report is initially provided by paramedics who state that they were dispatched to the patient's home secondary to unresponsiveness. Girlfriend called the squad as patient was not responding to voice since last night at 7:00 PM.  When EMS arrived, patient was not more awake and alert. They checked his blood glucose and it read 573. IV access was established and EMS administered 250 cc of normal saline. Patient was subsequently transported to our emergency department for evaluation. EMS notes that upon arrival to the emergency department, patient is awake and alert. He is responding to pain. EMS notes that they frequently get calls from patient's residence as patient is diabetic and is frequently noncompliant with his medications. Patient does have several complications of diabetes including left lower extremity amputation. Patient is able to answer yes or no questions. Denies headache, syncope, dizziness, lightheadedness. Denies double vision, blurry vision, change in vision. No flashes or floaters. No tinnitus, buzzing, ringing in the ears. Denies numbness, tingling, weakness, paresthesias, focal deficits. Denies dysarthria, aphasia, facial droop. Denies falls, head trauma or neck trauma. Denies neck pain or stiffness. Denies cocaine use. Denies fevers, chills, diaphoresis, night sweats. Denies chest pain, shortness of breath, pleuritic pain. Denies abdominal pain, nausea, vomiting, diarrhea, constipation, hematochezia, melena, dysuria, hematuria. Denies any additional precipitating, modifying, alleviating factors.      ROS - see HPI, below listed is current ROS at time of my eval:  A complete, 10 point review of systems was performed and is as dictated above, otherwise negative. No past medical history on file. No past surgical history on file. No family history on file.     Social History     Socioeconomic History    Marital status:      Spouse name: Not on file    Number of children: Not on file    Years of education: Not on file    Highest education level: Not on file   Occupational History    Not on file   Social Needs    Financial resource strain: Not on file    Food insecurity     Worry: Not on file     Inability: Not on file    Transportation needs     Medical: Not on file     Non-medical: Not on file   Tobacco Use    Smoking status: Not on file   Substance and Sexual Activity    Alcohol use: Not on file    Drug use: Not on file    Sexual activity: Not on file   Lifestyle    Physical activity     Days per week: Not on file     Minutes per session: Not on file    Stress: Not on file   Relationships    Social connections     Talks on phone: Not on file     Gets together: Not on file     Attends Church service: Not on file     Active member of club or organization: Not on file     Attends meetings of clubs or organizations: Not on file     Relationship status: Not on file    Intimate partner violence     Fear of current or ex partner: Not on file     Emotionally abused: Not on file     Physically abused: Not on file     Forced sexual activity: Not on file   Other Topics Concern    Not on file   Social History Narrative    Not on file       Current Facility-Administered Medications   Medication Dose Route Frequency Provider Last Rate Last Dose    glucose (GLUTOSE) 40 % oral gel 15 g  15 g Oral PRN Tera GERTRUDIS Talamantes, DO        dextrose 50 % IV solution  12.5 g Intravenous PRN Tera S [place], DO        glucagon (rDNA) injection 1 mg  1 mg Intramuscular PRN Tera S [place], DO        dextrose 5 % solution  100 mL/hr Intravenous PRN Tera GERTRUDIS Talamantes, DO        insulin regular (HUMULIN R;NOVOLIN R) 100 Units in sodium chloride 0.9 % 100 mL infusion  0.1 Units/kg/hr Intravenous Continuous Tera S Popeye Nipple, DO 11.3 mL/hr at 10/06/20 1724 0.1 Units/kg/hr at 10/06/20 1724    sodium bicarbonate 50 mEq in dextrose 5 % 1,000 mL infusion   Intravenous Continuous Tera Meng Mount, DO 50 mL/hr at 10/06/20 1712      aspirin chewable tablet 324 mg  324 mg Oral Once Tera GERTRUDIS Talamantes, DO         Current Outpatient Medications   Medication Sig Dispense Refill    tiZANidine (ZANAFLEX) 4 MG tablet Take 4 mg by mouth every 8 hours as needed      levothyroxine (SYNTHROID) 175 MCG tablet Take 175 mcg by mouth Daily      furosemide (LASIX) 20 MG tablet Take 20 mg by mouth daily      pregabalin (LYRICA) 150 MG capsule Take 150 mg by mouth 2 times daily.  insulin 70-30 (NOVOLIN 70/30) (70-30) 100 UNIT per ML injection vial Inject 30 Units into the skin 2 times daily      losartan (COZAAR) 50 MG tablet Take 50 mg by mouth daily      metoprolol succinate (TOPROL XL) 25 MG extended release tablet Take 25 mg by mouth daily         Allergies not on file    Nursing Notes Reviewed    Physical Exam:  Triage VS:    ED Triage Vitals   Enc Vitals Group      BP 10/06/20 1523 127/71      Pulse 10/06/20 1523 124      Resp 10/06/20 1528 26      Temp 10/06/20 1528 96 °F (35.6 °C)      Temp Source 10/06/20 1528 Axillary      SpO2 10/06/20 1527 98 %      Weight 10/06/20 1616 250 lb (113.4 kg)      Height 10/06/20 1616 5' 11\" (1.803 m)     My pulse ox interpretation is - normal    General appearance: Patient is awake, alert. Sitting upright in the exam gurney. Following commands and answering questions. Skin:  Warm. Dry. Intact. No herpes zoster lesions. Eye: Pupils are equal, round, reactive. Extraocular movements are intact. No horizontal, vertical, rotary nystagmus. No gaze deviation. Funduscopic exam reveals no papilledema. Disc margins are clear. Positive spontaneous venous pulsations. No Vivek sign is apparent. No proptosis is appreciated. Head, ears, nose, mouth and throat: Head is normocephalic and atraumatic. No external masses or lesions. No nasal drainage. Airways patent. No tenderness to palpation of the temporal arteries bilaterally. There is no jaw or temporomandibular joint tenderness or trauma. Mucous membranes are dry. Neck: Supple. No nuchal rigidity. No meningeal signs. Trachea is midline. No masses or thyromegaly. No JVD. No carotid thrills or bruits. Extremity:  No clubbing, cyanosis, or edema. Patient has left lower extremity below-knee amputation. 2/4 femoral, DP, PT pulses in the right lower extremity  Heart: Tachycardic rate and regular rhythm. Audible S1 and S2. No audible murmurs, rubs, gallops. Perfusion: Brisk capillary refill in bilateral upper extremities and right lower extremity. Respiratory: Respirations nonlabored. No rales, rhonchi, wheezes. Abdominal:   Soft. Non-peritoneal.  Bowel sounds in all 4 quadrants. No midline pulsatile abdominal masses. Neurological: Sensation is grossly intact to light touch and two-point discrimination. Cranial nerves II through XII are grossly intact. There is no dysmetria. No dysdiadochokinesis. No pronator drift. No cerebellar signs. Patient is able to ambulate without ataxia or gait instability. No focal or lateralizing neurologic deficits. No facial droop, slurred speech, aphasia. NIH stroke scale score is 0. Negative Kernig and Brudzinski signs. Psychiatric: Alert.     I have reviewed and interpreted all of the currently available lab results from this visit (if applicable):  Results for orders placed or performed during the hospital encounter of 10/06/20   CBC Auto Differential   Result Value Ref Range    WBC 27.0 (H) 4.0 - 10.5 K/CU MM    RBC 6.29 (H) 4.6 - 6.2 M/CU MM    Hemoglobin 18.3 (H) 13.5 - 18.0 GM/DL    Hematocrit 59.4 (H) 42 - 52 %    MCV 94.4 78 Urine, Quantitative NEGATIVE NEGATIVE    Leukocyte Esterase, Urine NEGATIVE NEGATIVE    RBC, UA <1 0 - 3 /HPF    WBC, UA <1 0 - 2 /HPF    Bacteria, UA NEGATIVE NEGATIVE /HPF    Mucus, UA RARE (A) NEGATIVE HPF    Trichomonas, UA NONE SEEN NONE SEEN /HPF    Hyaline Casts, UA 0 /LPF    Granular Casts, UA 1 /LPF   Lactic Acid, Plasma   Result Value Ref Range    Lactate 6.7 (HH) 0.4 - 2.0 mMOL/L   Blood Gas, Venous   Result Value Ref Range    pH, Henry 6.86 (L) 7.32 - 7.42    pCO2, Henry 22 (L) 38 - 52 mmHG    pO2, Henyr 89 (H) 28 - 48 mmHG    Base Excess 29 (H) 0 - 3.3    HCO3, Venous 3.9 (L) 19 - 25 MMOL/L    O2 Sat, Henry 92.3 (H) 50 - 70 %    Comment VBG    TSH without Reflex   Result Value Ref Range    TSH, High Sensitivity 3.490 0.270 - 4.20 uIu/ml   Urine Drug Screen   Result Value Ref Range    Cannabinoid Scrn, Ur NEGATIVE NEGATIVE    Amphetamines NEGATIVE NEGATIVE    Cocaine Metabolite NEGATIVE NEGATIVE    Benzodiazepine Screen, Urine NEGATIVE NEGATIVE    Barbiturate Screen, Ur NEGATIVE NEGATIVE    Opiates, Urine NEGATIVE NEGATIVE    Phencyclidine, Urine NEGATIVE NEGATIVE    Oxycodone NEGATIVE NEGATIVE   Beta-Hydroxybutyrate   Result Value Ref Range    Beta-Hydroxybutyrate >20.8 (H) 0.0 - 3.0 MG/DL   Lactic Acid, Plasma   Result Value Ref Range    Lactate 4.5 (HH) 0.4 - 2.0 mMOL/L   POCT Glucose   Result Value Ref Range    Glucose  mg/dL    QC OK? y    POCT Glucose   Result Value Ref Range    Glucose  mg/dL    QC OK? yes    EKG 12 Lead   Result Value Ref Range    Ventricular Rate 64 BPM    Atrial Rate 66 BPM    QRS Duration 192 ms    Q-T Interval 556 ms    QTc Calculation (Bazett) 573 ms    P Axis 48 degrees    R Axis 70 degrees    T Axis 226 degrees    Diagnosis       Ventricular-paced rhythm  Abnormal ECG  When compared with ECG of 06-OCT-2020 15:19,  Vent.  rate has decreased BY  19 BPM          Radiographs (if obtained):  Radiologist's Report Reviewed:  Ct Head Wo Contrast    Result Date: 10/6/2020  EXAMINATION: CT OF THE HEAD WITHOUT CONTRAST  10/6/2020 3:44 pm TECHNIQUE: CT of the head was performed without the administration of intravenous contrast. Dose modulation, iterative reconstruction, and/or weight based adjustment of the mA/kV was utilized to reduce the radiation dose to as low as reasonably achievable. COMPARISON: None. HISTORY: ORDERING SYSTEM PROVIDED HISTORY: confusion TECHNOLOGIST PROVIDED HISTORY: Reason for exam:->confusion Has a \"code stroke\" or \"stroke alert\" been called? ->No Reason for Exam: CONFUSION Acuity: Acute Type of Exam: Initial Additional signs and symptoms: PATIENT RESTRAINED, MULTIPLE ATTEMPTS, Relevant Medical/Surgical History: POOR HISTORIAN, UNABLE TO FOLLOW COMMNADS FINDINGS: BRAIN/VENTRICLES: There is no acute intracranial hemorrhage, mass effect or midline shift. No abnormal extra-axial fluid collection. The gray-white differentiation is maintained without evidence of an acute infarct. There is no evidence of hydrocephalus. ORBITS: The visualized portion of the orbits demonstrate no acute abnormality. SINUSES: The visualized paranasal sinuses and mastoid air cells demonstrate no acute abnormality. SOFT TISSUES/SKULL:  No acute abnormality of the visualized skull or soft tissues. No acute intracranial abnormality. Ct Abdomen Pelvis W Iv Contrast Additional Contrast? None    Result Date: 10/6/2020  EXAMINATION: CT OF THE ABDOMEN AND PELVIS WITH CONTRAST 10/6/2020 4:07 pm TECHNIQUE: CT of the abdomen and pelvis was performed with the administration of intravenous contrast. Multiplanar reformatted images are provided for review. Dose modulation, iterative reconstruction, and/or weight based adjustment of the mA/kV was utilized to reduce the radiation dose to as low as reasonably achievable. COMPARISON: None.  HISTORY: ORDERING SYSTEM PROVIDED HISTORY: acidosis, leukocytosis TECHNOLOGIST PROVIDED HISTORY: Reason for exam:->acidosis, leukocytosis Additional Contrast?->None Reason for Exam: acidosis, leukocytosis Acuity: Acute Type of Exam: Initial Additional signs and symptoms: UNAB;E TO FOLLOW COMMANDS Relevant Medical/Surgical History: CONTRAST USED FROM CT CHEST FINDINGS: Lower Chest: Lung bases are clear Organs: The liver and spleen are unremarkable gallbladder surgically absent. Simple cysts noted midpole left kidney. No evidence of a solid mass. No hydronephrosis no renal calculus. Pancreas unremarkable. Left and right adrenal gland are normal in appearance and size. GI/Bowel: CT duodenal diverticulum suggested. No acute abnormality noted of the GI tract appendix not adequately identified. No inflammatory changes noted around the cecum or elsewhere along the GI tract. Pelvis: No abnormal mass noted more fluid collection. No inflammatory changes noted in the pelvis. Peritoneum/Retroperitoneum aorta is normal contour course and caliber. No intraperitoneal fluid collections. No adenopathy. Bones/Soft Tissues: No evidence of fracture. No abnormal soft tissue mass noted. No acute abnormality noted in the abdomen or pelvis. Cta Pulmonary W Contrast    Result Date: 10/6/2020  EXAMINATION: CTA OF THE CHEST 10/6/2020 5:01 pm TECHNIQUE: CTA of the chest was performed after the administration of intravenous contrast.  Multiplanar reformatted images are provided for review. MIP images are provided for review. Dose modulation, iterative reconstruction, and/or weight based adjustment of the mA/kV was utilized to reduce the radiation dose to as low as reasonably achievable. COMPARISON: None.  HISTORY: ORDERING SYSTEM PROVIDED HISTORY: acidosis, DKA TECHNOLOGIST PROVIDED HISTORY: Reason for exam:->acidosis, DKA Reason for Exam: acidosis, DKA Acuity: Acute Type of Exam: Initial Additional signs and symptoms: UNABLE TO FOLLOW COMMANDS Relevant Medical/Surgical History: 75ML ISOVUE 370/ GFR 43 FINDINGS: Pulmonary Arteries: Severe motion artifact through the level of the lobar segmental vessel challenge evaluation. No definite central pulmonary embolism identified. Difficult to evaluate for distal emboli as well due to motion main pulmonary artery is unremarkable caliber. Mediastinum: Multiple calcified granulomas identified both hilar regions. Heart is enlarged. No pericardial effusion. Pacemaker leads identified with heart. Lungs/pleura: Atelectasis versus scarring identified within the anterior aspect of the right middle lobe. Remainder lungs otherwise grossly clear. Subpleural nodule left lower lobe along the major fissure 4 mm in size. Right lung apex nodule with adjacent area of scarring 7 mm in size. No pleural effusion pneumothorax. Upper Abdomen: Previous cholecystectomy. Question motion versus mural thickening along the duodenal sweep. Please refer to CT abdomen pelvis. Probable duodenal diverticulum noted. Soft Tissues/Bones: Moderate severe degenerate changes lower thoracic spine. Left chest wall pacemaker device     Extensive cardiac and respiratory motion through the hilar regions challenge evaluation. No definite central emboli identified. Up to 7 mm nodule right upper lobe. Fleischner Society guidelines for follow-up and management of incidentally detected pulmonary nodules: Single Solid Nodule: Nodule size less than 6 mm In a low-risk patient, no routine follow-up. In a high-risk patient, optional CT at 12 months. Nodule size equals 6-8 mm In a low-risk patient, CT at 6-12 months, then consider CT at 18-24 months. In a high-risk patient, CT at 6-12 months, then CT at 18-24 months. Nodule size greater than 8 mm In a low-risk patient, consider CT at 3 months, PET/CT, or tissue sampling. In a high-risk patient, consider CT at 3 months, PET/CT, or tissue sampling. Multiple Solid Nodules: Nodule size less than 6 mm In a low-risk patient, no routine follow-up. In a high-risk patient, optional CT at 12 months.  Nodule size equals 6-8 mm In a low-risk patient, CT at 3-6 months, then consider CT at 18-24 months. In a high-risk patient, CT at 3-6 months, then CT at 18-24 months. Nodule size greater than 8 mm In a low-risk patient, CT at 3-6 months, then consider CT at 18-24 months. In a high-risk patient, CT at 3-6 months, then CT at 18-24 months. - Low risk patients include individuals with minimal or absent history of smoking and other known risk factors. - High risk patients include individuals with a history or smoking or known risk factors. Radiology 2017 http://pubs. rsna.org/doi/full/10.1148/radiol. 0972400541       MDM:  Patient was seen and evaluated in the emergency department by myself. A thorough history and physical exam were performed, prior medical records are reviewed. Upon arrival to the emergency department patient's vital signs were noted. Patient is tachycardic heart rate is 124. Blood pressure within normal limits. Patient is mildly tachypneic with 26 respirations a minute. Temperature is afebrile. O2 saturation is 98% on room air. Patient was connected to continuous cardiopulmonary monitoring. Rhythm strip was interpreted by myself demonstrating sinus tachycardia. EKG performed on 10/6/2020 at 1519 demonstrates ventricular rate of 83 bpm in atrial fibrillation with frequent ventricular paced complexes. There is right axis deviation. No signs of acute ischemia, infarct, high degree heart block. Repeat EKG is performed and 10/6/2020 at 1703 demonstrating ventricular rate of 64 beats minute ventricular paced rhythm. No signs of acute ischemia, infarct, high degree heart block. Differential diagnoses and treatment plan were discussed. IV access is established and the patient was provided 1 L bolus of 0.9% normal saline. Blood glucose was checked and was too high to be registered. Pertinent labs were drawn and radiographic studies were performed. Once results are available reviewed by myself. Labs demonstrate leukocytosis white blood cell count 27.0.   Patient has no anemia or thrombocytopenia. Electrolytes demonstrate hyponatremia sodium 132. This is likely pseudohyponatremia secondary to hyperglycemia. Potassium is hyperkalemic at 6.2. Patient is hypochloremic with a chloride of 86. CO2 is 5. BUN is 44. Creatinine is elevated at 1.6. Glucose is 881. AST and ALT are unremarkable. GFR is 43. Anion gap is elevated at 41. Troponin is 0.014. PT/INR PTT within normal limits. Urine analysis does demonstrate greater than 500 glucose, large ketones. Lactic acid is 6.7. TSH is 3.49. Beta hydroxybutyrate is greater than 20.8. Venous blood gas was obtained and pH is 6.86, PCO2 22, PO2 89, bicarb 3.9. Urine drug screen was negative. Results of laboratory data were reviewed. Patient does appear to be in diabetic ketoacidosis. Insulin drip was initiated. Patient is hyperkalemic, there are no EKG changes present. Hyperkalemia will improve with insulin drip. In addition, patient has acute anion gap metabolic acidosis. pH is 6.86. Bicarbonate drip is also initiated in the emergency department due to severe acidosis. CT brain images are reviewed by myself. Radiology report reads No acute intracranial abnormality. Due to severe acidosis and leukocytosis, CT angiography pulmonary artery was obtained and radiology report reads extensive cardiac and respiratory motion through the hilar regions challenge evaluation. No definite central emboli identified. Up to 7 mm nodule right upper lobe. CT abdomen and pelvis with IV contrast is also obtained and radiology report reads No acute abnormality noted in the abdomen or pelvis. On repeat evaluations, patient remains hemodynamically stable. Neurologic evaluation remained stable without any further neurologic sequelae. Insulin drip is continued. Bicarbonate drip discontinued. Repeat CMP is ordered to trend electrolytes.   Results of laboratory and radiographic data along with differential diagnosis and treatment plan were discussed with the patient. Patient presents with change in mental status. Symptoms concerning for metabolic encephalopathy likely secondary to acute severe diabetic ketoacidosis with anion gap metabolic acidosis. In addition, patient has metabolic acidosis. He is hyperkalemic. Troponin is elevated. 324 mg chewable aspirin is given. Heparin drip is not initiated as patient does not have active chest pain and troponin is likely elevated due to metabolic process. Given patient symptoms and risk factors, I recommend admission to the hospital for further monitoring and treatment as necessary. Patient is agreeable. Case discussed with admitting hospitalist who is agreeable to treatment plan accepts admission. Patient is updated bedside. All questions were answered. All incidental findings on CT scan including pulmonary nodule were discussed with the patient. Patient is currently in the emergency department, in hemodynamically stable condition, awaiting admission. Prior to patient being transported to his bed, nursing staff asked that I come and evaluate the patient as he is mottling in the bilateral lower extremities. Patient has strong femoral pulse bilaterally. He does have a left lower extremity amputation, however he has DP and PT pulses in the right lower extremity. Compartments all remain soft and compressible. Extremities are warm to palpation. Patient has no midline pulsatile abdominal masses, thrills, bruits in the abdomen. Neurologic status remains intact without any neurologic sequelae. CT abdomen and pelvis with IV contrast did not demonstrate any pathology in the aorta. Radiologist report reads aorta is normal contour course and caliber. Admitting hospitalist will be updated. Clinical Impression:  1. Acute metabolic encephalopathy    2. Diabetic ketoacidosis without coma associated with type 2 diabetes mellitus ([facility] [address]    3.  Severe hyperglycemia due to diabetes mellitus ([facility] [address]    4. High anion gap metabolic acidosis    5. Metabolic acidosis due to diabetes mellitus ([facility] [address]    6. NSTEMI (non-ST elevated myocardial infarction) ([facility] [address]    7. Acute hyperkalemia    8. YEE (acute kidney injury) ([facility] [address]    9. Dehydration    10. Leukocytosis, unspecified type    11. 7 mm right upper lobe pulmonary nodule        Procedures:  ABG interpretation  Insulin drip titration      Critical Care Note:  Total critical care time provided today was 42 minutes. This excludes seperately billable procedures and family discussion time. Critical care time provided for obtaining history, conducting a physical exam, performing and monitoring interventions, ordering, collecting and interpreting tests, and establishing medical decision-making. There was a potential for life/limb threatening pathology requiring close evaluation and intervention with concern for patient decompensation. ED Provider Disposition Time  DISPOSITION Admitted 10/06/2020 06:28:38 PM      Comment: Please note this report has been produced using speech recognition software and may contain errors related to that system including errors in grammar, punctuation, and spelling, as well as words and phrases that may be inappropriate. Efforts were made to edit the dictations.        Peg Warren,   10/06/20 2007

## 2020-10-06 NOTE — ED NOTES
Bilateral lower extremites and sides of chest appear to be mottled. Dr. Caroline Zuleta notified and at bedside to assess.  Hospitalist also aware   Kimberly Donovan RN  10/06/20 1100 Salome Molina RN  10/06/20 [id number]

## 2020-10-06 NOTE — ED NOTES
Bed: 02TR-02  Expected date:   Expected time:   Means of arrival:   Comments:  [place] 70M, unresponsive; hx of DM,  [facility] [place], RN  10/06/20 2115

## 2020-10-06 NOTE — ED NOTES
[id number] [facility] [address] paged hospitalist     Rowan Mendez  10/06/20 1749  1800 Wales[address] Amaral mid level with [employer] returned call      Rowan Mendez  10/06/20 2038

## 2020-10-06 NOTE — ED TRIAGE NOTES
Ems provided girlfriend's name and phone number:  Ivet Pinto   [phone]    Pt is from home with girlfriend.

## 2020-10-06 NOTE — H&P
History and Physical      Name:  Sandra Alves /Age/Sex: 1950  (78 y.o. male)   MRN & CSN:  [id number] & [id number] Admission Date/Time: 10/6/2020  3:23 PM   Location:  [facility]- PCP: Heather Nesbitt DO       Discussed patient with Dr. Nikole Collier and Plan:     Sandra Alves is a 78 y.o.  male  who presents with AMS    - DKA   VB.86, PCO2 22, + ketones in urine, b hydroxy > 20, glucose 881  ? Non-compliance with home regimen  Cont insulin gtt  0.45 NS until glucose < 250, then D5 .45 w/o K  Q 4 BMP, Mg, phos  Serial lactic until normal  Consult to ENDO  Repeat ABG  Cont bicarb gtt    - AMS  Likely 2/2 metabolic encephalopathy 2/2 DKA  CT head: non-acute  Appears improving since arrival  Will check TSH, ammonia    - Hyperkalemia  K 6.2  Cont insulin gtt  Holding further treatment as K will decrease with correction of #1  Serial BMP q 4 hrs    - CKD/YEE  Creat 1.6 (unknown baseline)  IVF and BMP trend as above  Consider nephro consult if no improvement overnight    - BLE discoloration  Mottled skin appearance to BLE  Warm skin, sensation intact, distal pulses + 2 to RLE; LLE has BKA but is mottled proximally  Consider doppler     - Elevated trop  0.014  Denies CP but has AMS  Likely 2/2 CKD/YEE  Trend trop; hold card consult    - Leukocytosis  WBC 27- CBC appears concentrated  Repeat CBC- if WBC not decreased, consider meningitis workup  Denies neck pain; demonstrates ROM    - Pulmonary nodule  RUL, 7 mm  Noted on CTA-P  Will need outpatient f/u in 6 - 12 months        Chronic  - HTN- Takes cozaar, bb  - Hypothyroidism- Cont synthroid  - ? CHF- Has 20 lasix q day on med list; unknown if still taking; no echo on file. Does not appear in exacerbation. Holding lasix due to YEE, DKA.  Holding echo at this time    Discussed patient with ER physician     Diet NPO    DVT Prophylaxis [] Lovenox, [x]  Heparin, [] SCDs, [] Ambulation   GI Prophylaxis [x] PPI,  [] H2 Blocker,  [] Carafate,  [] Diet/Tube Feeds   Code Status Full   Disposition Patient requires continued admission due to    MDM [] Low, [] Moderate,[x]  High  Patient's risk as above due to      [] One or more chronic illnesses with exacerbation progression      [x] Two or more stable chronic illnesses      [x] Undiagnosed new problem with uncertain prognosis      [] Elective major surgery      []Prescription drug management       History of Present Illness:     Chief Complaint: AMS    Alexia Epstein is a 78 y.o.  male  who presents with the above complaints, onset unclear. Patient is alone, has AMS. HPI constructed from staff, medical record. Patient's girlfriend s/w ED staff, who report she told them patient was unresponsive on awakening this morning. She did not call EMS until afternoon- unclear as to reason for delay. There is some question as to patient's compliance with medication from report of girlfriend. Apparently, patient had been in baseline state of health per report. At time of exam, patient is awake, does answer yes or no to questions; is not oriented to time. Denies lower extremity pain, chest pain/pressure. Reports mild, diffuse abdominal pain. Denies regular alcohol use, illicit drug use. Ten point ROS reviewed negative, unless as noted above    Objective: Intake/Output Summary (Last 24 hours) at 10/6/2020 1805  Last data filed at 10/6/2020 1532  Gross per 24 hour   Intake --   Output 550 ml   Net -550 ml        Vitals:   Vitals:    10/06/20 1747   BP: 130/62   Pulse: 65   Resp: 24   Temp: 97.5   SpO2: 100%       Physical Exam:     GEN Awake male, sitting upright in bed. Appears given age. EYES Pupils are equally round. No scleral erythema, discharge, or conjunctivitis. HENT Mucous membranes are moist. Oral pharynx without exudates, no evidence of thrush. NECK Supple, no apparent thyromegaly or masses. RESP Clear to auscultation, no wheezes, rales or rhonchi.   Symmetric chest movement while on room air. CARDIO/VASC S1/S2 auscultated. Regular rate without appreciable murmurs, rubs, or gallops. No JVD or carotid bruits. Peripheral pulses equal bilaterally and palpable. No peripheral edema. GI Abdomen is soft without significant tenderness, masses, or guarding. Bowel sounds are normoactive. Rectal exam deferred.  No costovertebral angle tenderness. Lopez catheter is not present. HEME/LYMPH No palpable cervical lymphadenopathy and no hepatosplenomegaly. No petechiae or ecchymoses. MSK Has LLE BKA. Strength equal in BLE and BUE. 2+ dp/pt pulses RLE. SKIN BLE with mottled appearance. Skin is warm, dry. NEURO Cranial nerves appear grossly intact, normal speech, no lateralizing weakness. PSYCH Awake, alert, oriented x 2- self and situation, not time. Past Medical History:      Unable to obtain due to AMS    PSHX:  Unable to obtain due to AMS    Allergies:  Allergies not on file    FAM HX: unable to obtain due to AMS    Soc HX:   Social History     Socioeconomic History    Marital status:      Spouse name: Not on file    Number of children: Not on file    Years of education: Not on file    Highest education level: Not on file   Occupational History    Not on file   Social Needs    Financial resource strain: Not on file    Food insecurity     Worry: Not on file     Inability: Not on file    Transportation needs     Medical: Not on file     Non-medical: Not on file   Tobacco Use    Smoking status: Not on file   Substance and Sexual Activity    Alcohol use: Not on file    Drug use: Not on file    Sexual activity: Not on file   Lifestyle    Physical activity     Days per week: Not on file     Minutes per session: Not on file    Stress: Not on file   Relationships    Social connections     Talks on phone: Not on file     Gets together: Not on file     Attends Anabaptism service: Not on file     Active member of club or organization: Not on file     Attends meetings of clubs or organizations: Not on file     Relationship status: Not on file    Intimate partner violence     Fear of current or ex partner: Not on file     Emotionally abused: Not on file     Physically abused: Not on file     Forced sexual activity: Not on file   Other Topics Concern    Not on file   Social History Narrative    Not on file       Medications:     Medications:      tiZANidine (ZANAFLEX) 4 MG tablet  Take 4 mg by mouth every 8 hours as needed  Historical Provider, MD  Needs Review    levothyroxine (SYNTHROID) 175 MCG tablet  Take 175 mcg by mouth Daily  Historical Provider, MD  Needs Review    furosemide (LASIX) 20 MG tablet  Take 20 mg by mouth daily  Historical Provider, MD  Needs Review    pregabalin (LYRICA) 150 MG capsule  Take 150 mg by mouth 2 times daily. Historical Provider, MD  Needs Review    insulin 70-30 (NOVOLIN 70/30) (70-30) 100 UNIT per ML injection vial  Inject 30 Units into the skin 2 times daily  Historical Provider, MD  Needs Review    losartan (COZAAR) 50 MG tablet  Take 50 mg by mouth daily  Historical Provider, MD  Needs Review    metoprolol succinate (TOPROL XL) 25 MG extended release tablet  Take 25 mg by mouth daily  Historical Provider, MD  Needs Review        Data:     CTA PULMONARY W CONTRAST [[id number]]  Collected: 10/06/20 1711        Order Status: Completed  Updated: 10/06/20 1731       Narrative:         EXAMINATION:   CTA OF THE CHEST 10/6/2020 5:01 pm     TECHNIQUE:   CTA of the chest was performed after the administration of intravenous   contrast.  Multiplanar reformatted images are provided for review.  MIP   images are provided for review. Dose modulation, iterative reconstruction,   and/or weight based adjustment of the mA/kV was utilized to reduce the   radiation dose to as low as reasonably achievable. COMPARISON:   None.      HISTORY:   ORDERING SYSTEM PROVIDED HISTORY: acidosis, DKA   TECHNOLOGIST PROVIDED HISTORY:   Reason for exam:->acidosis, DKA   Reason for Exam: acidosis, DKA   Acuity: Acute   Type of Exam: Initial   Additional signs and symptoms: UNABLE TO FOLLOW COMMANDS   Relevant Medical/Surgical History: 75ML ISOVUE 370/ GFR 43     FINDINGS:   Pulmonary Arteries: Severe motion artifact through the level of the lobar   segmental vessel challenge evaluation.  No definite central pulmonary   embolism identified.  Difficult to evaluate for distal emboli as well due to   motion main pulmonary artery is unremarkable caliber. Mediastinum: Multiple calcified granulomas identified both hilar regions. Heart is enlarged.  No pericardial effusion.  Pacemaker leads identified with   heart. Lungs/pleura: Atelectasis versus scarring identified within the anterior   aspect of the right middle lobe.  Remainder lungs otherwise grossly clear. Subpleural nodule left lower lobe along the major fissure 4 mm in size. Right lung apex nodule with adjacent area of scarring 7 mm in size.  No   pleural effusion pneumothorax. Upper Abdomen: Previous cholecystectomy.  Question motion versus mural   thickening along the duodenal sweep.  Please refer to CT abdomen pelvis. Probable duodenal diverticulum noted. Soft Tissues/Bones: Moderate severe degenerate changes lower thoracic spine. Left chest wall pacemaker device       Impression:         Extensive cardiac and respiratory motion through the hilar regions challenge   evaluation.  No definite central emboli identified. Up to 7 mm nodule right upper lobe. Fleischner Society guidelines for follow-up and management of incidentally   detected pulmonary nodules:     Single Solid Nodule:     Nodule size less than 6 mm   In a low-risk patient, no routine follow-up. In a high-risk patient, optional CT at 12 months. Nodule size equals 6-8 mm   In a low-risk patient, CT at 6-12 months, then consider CT at 18-24 months.    In a high-risk patient, CT at 6-12 months, then CT at 18-24 Relevant Medical/Surgical History: CONTRAST USED FROM CT CHEST     FINDINGS:   Lower Chest: Lung bases are clear     Organs:   The liver and spleen are unremarkable gallbladder surgically   absent.  Simple cysts noted midpole left kidney.  No evidence of a solid   mass.  No hydronephrosis no renal calculus. Pancreas unremarkable. Left and right adrenal gland are normal in appearance and size. GI/Bowel: CT duodenal diverticulum suggested.  No acute abnormality noted of   the GI tract appendix not adequately identified.  No inflammatory changes   noted around the cecum or elsewhere along the GI tract. Pelvis: No abnormal mass noted more fluid collection.  No inflammatory   changes noted in the pelvis. Peritoneum/Retroperitoneum aorta is normal contour course and caliber.  No   intraperitoneal fluid collections.  No adenopathy. Bones/Soft Tissues: No evidence of fracture.  No abnormal soft tissue mass   noted.       Impression:         No acute abnormality noted in the abdomen or pelvis.       CT Head WO Contrast [[id number]]  Collected: 10/06/20 1710       Order Status: Completed  Updated: 10/06/20 1714       Narrative:         EXAMINATION:   CT OF THE HEAD WITHOUT CONTRAST  10/6/2020 3:44 pm     TECHNIQUE:   CT of the head was performed without the administration of intravenous   contrast. Dose modulation, iterative reconstruction, and/or weight based   adjustment of the mA/kV was utilized to reduce the radiation dose to as low   as reasonably achievable. COMPARISON:   None. HISTORY:   ORDERING SYSTEM PROVIDED HISTORY: confusion   TECHNOLOGIST PROVIDED HISTORY:   Reason for exam:->confusion   Has a \"code stroke\" or \"stroke alert\" been called? ->No   Reason for Exam: CONFUSION   Acuity: Acute   Type of Exam: Initial   Additional signs and symptoms: PATIENT RESTRAINED, MULTIPLE ATTEMPTS,   Relevant Medical/Surgical History: POOR HISTORIAN, UNABLE TO FOLLOW COMMNADS     FINDINGS: BRAIN/VENTRICLES: There is no acute intracranial hemorrhage, mass effect or   midline shift.  No abnormal extra-axial fluid collection.  The gray-white   differentiation is maintained without evidence of an acute infarct.  There is   no evidence of hydrocephalus. ORBITS: The visualized portion of the orbits demonstrate no acute abnormality. SINUSES: The visualized paranasal sinuses and mastoid air cells demonstrate   no acute abnormality. SOFT TISSUES/SKULL:  No acute abnormality of the visualized skull or soft   tissues.       Impression:         No acute intracranial abnormality.           Ventricular-paced rhythm   Abnormal ECG   When compared with ECG of 06-OCT-2020 15:19,   Vent.  rate has decreased BY  19 BPM   .  Electronically signed by MASOOD Lock NP on 10/6/2020 at 6:05 PM

## 2020-10-06 NOTE — ED TRIAGE NOTES
Pt to Ed via ems for unresponsiveness. Ems states girlfriend called squad due to pt not responding to voice since last night at 7pm. Pt is awake and responds to pain at this time. Ems states pt's blood sugar was 573 and has received around 250cc of fluid.

## 2020-10-06 NOTE — LETTER
[facility] [address]  [address] [address]  Phone: [phone]    No name on file. October 9, 2020     Patient: Hiren Rios   YOB: 1950   Date of Visit: 10/6/2020       To Whom It May Concern: It is my medical opinion that Hiren Rios will be in the hospital for a few more days at least. He was admiitted to the ICU on the 6th. .    If you have any questions or concerns, please don't hesitate to call. Sincerely,        No name on file.

## 2020-10-06 NOTE — PROGRESS NOTES
Medication History  [facility]    Patient Name: Holli Sanders 1950     Medication history has been completed by: Sharan Lowe CPhT    Source(s) of information: patient's girlfriend and retail pharmacy     Primary Care Physician: Jerris Harada, DO     Pharmacy: [id number] as of 10/06/2020    (Not on File)        Prior to Admission medications    Medication Sig Start Date End Date Taking? Authorizing Provider   insulin 70-30 (NOVOLIN 70/30) (70-30) 100 UNIT per ML injection vial Inject 30 Units into the skin 2 times daily   Yes Historical Provider, MD   tiZANidine (ZANAFLEX) 4 MG tablet Take 4 mg by mouth every 8 hours as needed   Yes Historical Provider, MD   levothyroxine (SYNTHROID) 175 MCG tablet Take 175 mcg by mouth Daily   Yes Historical Provider, MD   furosemide (LASIX) 20 MG tablet Take 20 mg by mouth daily   Yes Historical Provider, MD   pregabalin (LYRICA) 150 MG capsule Take 150 mg by mouth 2 times daily. Yes Historical Provider, MD   losartan (COZAAR) 50 MG tablet Take 50 mg by mouth daily    Historical Provider, MD   metoprolol succinate (TOPROL XL) 25 MG extended release tablet Take 25 mg by mouth daily    Historical Provider, MD     Medications added or changed (ex. new medication, dosage change, interval change, formulation change):  See medication list as stated above    Comments:  No claims available for review. Reached out to patient's girlfriend. Ms Ines Waldrop read prescription bottles via phone. Reached out to retail pharmacy for additional medications patient was taking and verification of prescription dates. Girlfriend did not mention insulin per pharmacist patient should have been taking Novolin 70/30 30 units BID. Unknown when last taken. Other medications not mentioned were Losartan last filled in June for 90 day supply and Metoprolol ER last filled in June for 30 day supply.     To my knowledge the above medication history is accurate as of 10/6/2020 5:09 PM.   Zuleyma Patricia CPhT   10/6/2020 5:09 PM

## 2020-10-07 ENCOUNTER — APPOINTMENT (OUTPATIENT)
Dept: GENERAL RADIOLOGY | Age: 70
DRG: 871 | End: 2020-10-07
Payer: MEDICARE

## 2020-10-07 LAB
AMMONIA: 57 UMOL/L (ref 16–60)
AMMONIA: 69 UMOL/L (ref 16–60)
ANION GAP SERPL CALCULATED.3IONS-SCNC: 11 MMOL/L (ref 4–16)
ANION GAP SERPL CALCULATED.3IONS-SCNC: 13 MMOL/L (ref 4–16)
ANION GAP SERPL CALCULATED.3IONS-SCNC: 16 MMOL/L (ref 4–16)
ANION GAP SERPL CALCULATED.3IONS-SCNC: 20 MMOL/L (ref 4–16)
ANION GAP SERPL CALCULATED.3IONS-SCNC: 20 MMOL/L (ref 4–16)
ANION GAP SERPL CALCULATED.3IONS-SCNC: 36 MMOL/L (ref 4–16)
ANION GAP SERPL CALCULATED.3IONS-SCNC: 9 MMOL/L (ref 4–16)
APTT: 19 SECONDS (ref 25.1–37.1)
BASE EXCESS: 10 (ref 0–3.3)
BASE EXCESS: 3 (ref 0–3.3)
BASOPHILS ABSOLUTE: 0.1 K/CU MM
BASOPHILS RELATIVE PERCENT: 0.2 % (ref 0–1)
BUN BLDV-MCNC: 23 MG/DL (ref 6–23)
BUN BLDV-MCNC: 24 MG/DL (ref 6–23)
BUN BLDV-MCNC: 28 MG/DL (ref 6–23)
BUN BLDV-MCNC: 34 MG/DL (ref 6–23)
BUN BLDV-MCNC: 37 MG/DL (ref 6–23)
BUN BLDV-MCNC: 40 MG/DL (ref 6–23)
BUN BLDV-MCNC: 44 MG/DL (ref 6–23)
CALCIUM SERPL-MCNC: 10.2 MG/DL (ref 8.3–10.6)
CALCIUM SERPL-MCNC: 7.5 MG/DL (ref 8.3–10.6)
CALCIUM SERPL-MCNC: 8.6 MG/DL (ref 8.3–10.6)
CALCIUM SERPL-MCNC: 8.6 MG/DL (ref 8.3–10.6)
CALCIUM SERPL-MCNC: 9.2 MG/DL (ref 8.3–10.6)
CALCIUM SERPL-MCNC: 9.3 MG/DL (ref 8.3–10.6)
CALCIUM SERPL-MCNC: 9.6 MG/DL (ref 8.3–10.6)
CARBON MONOXIDE, BLOOD: 1.9 % (ref 0–5)
CARBON MONOXIDE, BLOOD: 2 % (ref 0–5)
CHLORIDE BLD-SCNC: 102 MMOL/L (ref 99–110)
CHLORIDE BLD-SCNC: 104 MMOL/L (ref 99–110)
CHLORIDE BLD-SCNC: 106 MMOL/L (ref 99–110)
CHLORIDE BLD-SCNC: 111 MMOL/L (ref 99–110)
CHLORIDE BLD-SCNC: 96 MMOL/L (ref 99–110)
CO2 CONTENT: 14.6 MMOL/L (ref 19–24)
CO2 CONTENT: 23.2 MMOL/L (ref 19–24)
CO2: 14 MMOL/L (ref 21–32)
CO2: 17 MMOL/L (ref 21–32)
CO2: 19 MMOL/L (ref 21–32)
CO2: 20 MMOL/L (ref 21–32)
CO2: 20 MMOL/L (ref 21–32)
CO2: 23 MMOL/L (ref 21–32)
CO2: 6 MMOL/L (ref 21–32)
COMMENT: ABNORMAL
COMMENT: ABNORMAL
CREAT SERPL-MCNC: 0.8 MG/DL (ref 0.9–1.3)
CREAT SERPL-MCNC: 0.9 MG/DL (ref 0.9–1.3)
CREAT SERPL-MCNC: 1 MG/DL (ref 0.9–1.3)
CREAT SERPL-MCNC: 1.1 MG/DL (ref 0.9–1.3)
CREAT SERPL-MCNC: 1.3 MG/DL (ref 0.9–1.3)
DIFFERENTIAL TYPE: ABNORMAL
EKG ATRIAL RATE: 119 BPM
EKG ATRIAL RATE: 66 BPM
EKG ATRIAL RATE: 74 BPM
EKG DIAGNOSIS: NORMAL
EKG P AXIS: 38 DEGREES
EKG P AXIS: 48 DEGREES
EKG P-R INTERVAL: 124 MS
EKG Q-T INTERVAL: 378 MS
EKG Q-T INTERVAL: 460 MS
EKG Q-T INTERVAL: 556 MS
EKG QRS DURATION: 150 MS
EKG QRS DURATION: 156 MS
EKG QRS DURATION: 192 MS
EKG QTC CALCULATION (BAZETT): 531 MS
EKG QTC CALCULATION (BAZETT): 540 MS
EKG QTC CALCULATION (BAZETT): 573 MS
EKG R AXIS: 121 DEGREES
EKG R AXIS: 177 DEGREES
EKG R AXIS: 70 DEGREES
EKG T AXIS: 0 DEGREES
EKG T AXIS: 226 DEGREES
EKG T AXIS: 39 DEGREES
EKG VENTRICULAR RATE: 119 BPM
EKG VENTRICULAR RATE: 64 BPM
EKG VENTRICULAR RATE: 83 BPM
EOSINOPHILS ABSOLUTE: 0 K/CU MM
EOSINOPHILS RELATIVE PERCENT: 0 % (ref 0–3)
GFR AFRICAN AMERICAN: >60 ML/MIN/1.73M2
GFR NON-AFRICAN AMERICAN: 55 ML/MIN/1.73M2
GFR NON-AFRICAN AMERICAN: >60 ML/MIN/1.73M2
GLUCOSE BLD-MCNC: 245 MG/DL (ref 70–99)
GLUCOSE BLD-MCNC: 246 MG/DL (ref 70–99)
GLUCOSE BLD-MCNC: 261 MG/DL (ref 70–99)
GLUCOSE BLD-MCNC: 274 MG/DL (ref 70–99)
GLUCOSE BLD-MCNC: 292 MG/DL (ref 70–99)
GLUCOSE BLD-MCNC: 301 MG/DL (ref 70–99)
GLUCOSE BLD-MCNC: 307 MG/DL (ref 70–99)
GLUCOSE BLD-MCNC: 315 MG/DL (ref 70–99)
GLUCOSE BLD-MCNC: 315 MG/DL (ref 70–99)
GLUCOSE BLD-MCNC: 316 MG/DL (ref 70–99)
GLUCOSE BLD-MCNC: 318 MG/DL (ref 70–99)
GLUCOSE BLD-MCNC: 325 MG/DL (ref 70–99)
GLUCOSE BLD-MCNC: 331 MG/DL (ref 70–99)
GLUCOSE BLD-MCNC: 350 MG/DL (ref 70–99)
GLUCOSE BLD-MCNC: 366 MG/DL (ref 70–99)
GLUCOSE BLD-MCNC: 518 MG/DL (ref 70–99)
GLUCOSE BLD-MCNC: 599 MG/DL (ref 70–99)
GLUCOSE BLD-MCNC: 705 MG/DL (ref 70–99)
GLUCOSE, CSF: 331 MG/DL (ref 40–75)
GRAM SMEAR: ABNORMAL
HCO3 ARTERIAL: 13.9 MMOL/L (ref 18–23)
HCO3 ARTERIAL: 22 MMOL/L (ref 18–23)
HCT VFR BLD CALC: 49.5 % (ref 42–52)
HEMOGLOBIN: 16.6 GM/DL (ref 13.5–18)
HIGH SENSITIVE C-REACTIVE PROTEIN: 67.8 MG/L
IMMATURE NEUTROPHIL %: 1.8 % (ref 0–0.43)
INR BLD: 1.03 INDEX
LACTATE: 1.4 MMOL/L (ref 0.4–2)
LYMPHOCYTES ABSOLUTE: 0.5 K/CU MM
LYMPHOCYTES RELATIVE PERCENT: 2.2 % (ref 24–44)
LYMPHS CSF: 27 %
LYMPHS CSF: 34 %
Lab: ABNORMAL
MAGNESIUM: 2.2 MG/DL (ref 1.8–2.4)
MCH RBC QN AUTO: 29.1 PG (ref 27–31)
MCHC RBC AUTO-ENTMCNC: 33.5 % (ref 32–36)
MCV RBC AUTO: 86.8 FL (ref 78–100)
METHEMOGLOBIN ARTERIAL: 1.2 %
METHEMOGLOBIN ARTERIAL: 1.6 %
MONOCYTES ABSOLUTE: 1.9 K/CU MM
MONOCYTES RELATIVE PERCENT: 7.8 % (ref 0–4)
NUCLEATED RBC %: 0 %
O2 SATURATION: 90 % (ref 96–97)
O2 SATURATION: 94.3 % (ref 96–97)
OTHER CELLS CSF: ABNORMAL CU MM
OTHER CELLS CSF: ABNORMAL CU MM
OTHER CELLS, CSF: ABNORMAL %
OTHER CELLS, CSF: ABNORMAL %
PCO2 ARTERIAL: 24 MMHG (ref 32–45)
PCO2 ARTERIAL: 38 MMHG (ref 32–45)
PDW BLD-RTO: 13.5 % (ref 11.7–14.9)
PH BLOOD: 7.37 (ref 7.34–7.45)
PH BLOOD: 7.37 (ref 7.34–7.45)
PHOSPHORUS: 0.9 MG/DL (ref 2.5–4.9)
PLATELET # BLD: 421 K/CU MM (ref 140–440)
PMV BLD AUTO: 9.1 FL (ref 7.5–11.1)
PO2 ARTERIAL: 51 MMHG (ref 75–100)
PO2 ARTERIAL: 68 MMHG (ref 75–100)
POTASSIUM SERPL-SCNC: 2.9 MMOL/L (ref 3.5–5.1)
POTASSIUM SERPL-SCNC: 2.9 MMOL/L (ref 3.5–5.1)
POTASSIUM SERPL-SCNC: 3.8 MMOL/L (ref 3.5–5.1)
POTASSIUM SERPL-SCNC: 3.9 MMOL/L (ref 3.5–5.1)
POTASSIUM SERPL-SCNC: 4.2 MMOL/L (ref 3.5–5.1)
POTASSIUM SERPL-SCNC: 4.2 MMOL/L (ref 3.5–5.1)
POTASSIUM SERPL-SCNC: 5.3 MMOL/L (ref 3.5–5.1)
PRO-BNP: 495.3 PG/ML
PROCALCITONIN: 0.21
PROTEIN CSF: 85 MG/DL (ref 15–45)
PROTHROMBIN TIME: 12.5 SECONDS (ref 11.7–14.5)
RBC # BLD: 5.7 M/CU MM (ref 4.6–6.2)
RBC CSF: 2 CU MM
RBC CSF: 2 CU MM
SEGMENTED NEUTROPHILS ABSOLUTE COUNT: 21.6 K/CU MM
SEGMENTED NEUTROPHILS RELATIVE PERCENT: 88 % (ref 36–66)
SEGS, CSF: 0 %
SEGS, CSF: 6 %
SODIUM BLD-SCNC: 133 MMOL/L (ref 135–145)
SODIUM BLD-SCNC: 136 MMOL/L (ref 135–145)
SODIUM BLD-SCNC: 138 MMOL/L (ref 135–145)
SODIUM BLD-SCNC: 143 MMOL/L (ref 135–145)
SODIUM BLD-SCNC: 144 MMOL/L (ref 135–145)
SODIUM BLD-SCNC: 145 MMOL/L (ref 135–145)
SODIUM BLD-SCNC: 146 MMOL/L (ref 135–145)
SPECIMEN: ABNORMAL
TOTAL IMMATURE NEUTOROPHIL: 0.45 K/CU MM
TOTAL NUCLEATED RBC: 0 K/CU MM
TROPONIN T: 0.02 NG/ML
TROPONIN T: 0.02 NG/ML
TUBE #: ABNORMAL
TUBE #: ABNORMAL
WBC # BLD: 24.5 K/CU MM (ref 4–10.5)
WBC CSF: 6 CU MM (ref 0–5)
WBC CSF: 6 CU MM (ref 0–5)

## 2020-10-07 PROCEDURE — 84100 ASSAY OF PHOSPHORUS: CPT

## 2020-10-07 PROCEDURE — 6370000000 HC RX 637 (ALT 250 FOR IP): Performed by: INTERNAL MEDICINE

## 2020-10-07 PROCEDURE — 76937 US GUIDE VASCULAR ACCESS: CPT

## 2020-10-07 PROCEDURE — 86141 C-REACTIVE PROTEIN HS: CPT

## 2020-10-07 PROCEDURE — 94761 N-INVAS EAR/PLS OXIMETRY MLT: CPT

## 2020-10-07 PROCEDURE — C1751 CATH, INF, PER/CENT/MIDLINE: HCPCS

## 2020-10-07 PROCEDURE — 6360000002 HC RX W HCPCS: Performed by: NURSE PRACTITIONER

## 2020-10-07 PROCEDURE — 6360000002 HC RX W HCPCS: Performed by: INTERNAL MEDICINE

## 2020-10-07 PROCEDURE — 2580000003 HC RX 258: Performed by: INTERNAL MEDICINE

## 2020-10-07 PROCEDURE — 36600 WITHDRAWAL OF ARTERIAL BLOOD: CPT

## 2020-10-07 PROCEDURE — C9113 INJ PANTOPRAZOLE SODIUM, VIA: HCPCS | Performed by: NURSE PRACTITIONER

## 2020-10-07 PROCEDURE — 36569 INSJ PICC 5 YR+ W/O IMAGING: CPT

## 2020-10-07 PROCEDURE — 2580000003 HC RX 258: Performed by: NURSE PRACTITIONER

## 2020-10-07 PROCEDURE — 83605 ASSAY OF LACTIC ACID: CPT

## 2020-10-07 PROCEDURE — 87529 HSV DNA AMP PROBE: CPT

## 2020-10-07 PROCEDURE — 87205 SMEAR GRAM STAIN: CPT

## 2020-10-07 PROCEDURE — 009U3ZX DRAINAGE OF SPINAL CANAL, PERCUTANEOUS APPROACH, DIAGNOSTIC: ICD-10-PCS | Performed by: RADIOLOGY

## 2020-10-07 PROCEDURE — 2500000003 HC RX 250 WO HCPCS: Performed by: INTERNAL MEDICINE

## 2020-10-07 PROCEDURE — 80048 BASIC METABOLIC PNL TOTAL CA: CPT

## 2020-10-07 PROCEDURE — B01B1ZZ FLUOROSCOPY OF SPINAL CORD USING LOW OSMOLAR CONTRAST: ICD-10-PCS | Performed by: RADIOLOGY

## 2020-10-07 PROCEDURE — 87070 CULTURE OTHR SPECIMN AEROBIC: CPT

## 2020-10-07 PROCEDURE — 84157 ASSAY OF PROTEIN OTHER: CPT

## 2020-10-07 PROCEDURE — 02HV33Z INSERTION OF INFUSION DEVICE INTO SUPERIOR VENA CAVA, PERCUTANEOUS APPROACH: ICD-10-PCS | Performed by: INTERNAL MEDICINE

## 2020-10-07 PROCEDURE — 85610 PROTHROMBIN TIME: CPT

## 2020-10-07 PROCEDURE — 83880 ASSAY OF NATRIURETIC PEPTIDE: CPT

## 2020-10-07 PROCEDURE — 89051 BODY FLUID CELL COUNT: CPT

## 2020-10-07 PROCEDURE — 2000000000 HC ICU R&B

## 2020-10-07 PROCEDURE — 85730 THROMBOPLASTIN TIME PARTIAL: CPT

## 2020-10-07 PROCEDURE — 74018 RADEX ABDOMEN 1 VIEW: CPT

## 2020-10-07 PROCEDURE — 82945 GLUCOSE OTHER FLUID: CPT

## 2020-10-07 PROCEDURE — 82140 ASSAY OF AMMONIA: CPT

## 2020-10-07 PROCEDURE — 62328 DX LMBR SPI PNXR W/FLUOR/CT: CPT

## 2020-10-07 PROCEDURE — 93010 ELECTROCARDIOGRAM REPORT: CPT | Performed by: INTERNAL MEDICINE

## 2020-10-07 PROCEDURE — 83735 ASSAY OF MAGNESIUM: CPT

## 2020-10-07 PROCEDURE — 93005 ELECTROCARDIOGRAM TRACING: CPT | Performed by: INTERNAL MEDICINE

## 2020-10-07 PROCEDURE — 85025 COMPLETE CBC W/AUTO DIFF WBC: CPT

## 2020-10-07 PROCEDURE — 82962 GLUCOSE BLOOD TEST: CPT

## 2020-10-07 PROCEDURE — 93308 TTE F-UP OR LMTD: CPT

## 2020-10-07 PROCEDURE — 84145 PROCALCITONIN (PCT): CPT

## 2020-10-07 PROCEDURE — 82803 BLOOD GASES ANY COMBINATION: CPT

## 2020-10-07 RX ORDER — LIDOCAINE HYDROCHLORIDE 10 MG/ML
5 INJECTION, SOLUTION EPIDURAL; INFILTRATION; INTRACAUDAL; PERINEURAL ONCE
Status: COMPLETED | OUTPATIENT
Start: 2020-10-07 | End: 2020-10-07

## 2020-10-07 RX ORDER — LORAZEPAM 2 MG/ML
2 INJECTION INTRAMUSCULAR ONCE
Status: COMPLETED | OUTPATIENT
Start: 2020-10-07 | End: 2020-10-07

## 2020-10-07 RX ORDER — SODIUM CHLORIDE 0.9 % (FLUSH) 0.9 %
10 SYRINGE (ML) INJECTION EVERY 12 HOURS SCHEDULED
Status: DISCONTINUED | OUTPATIENT
Start: 2020-10-07 | End: 2020-10-09

## 2020-10-07 RX ORDER — LORAZEPAM 2 MG/ML
0.5 INJECTION INTRAMUSCULAR ONCE
Status: COMPLETED | OUTPATIENT
Start: 2020-10-07 | End: 2020-10-07

## 2020-10-07 RX ORDER — SODIUM CHLORIDE 0.9 % (FLUSH) 0.9 %
10 SYRINGE (ML) INJECTION PRN
Status: DISCONTINUED | OUTPATIENT
Start: 2020-10-07 | End: 2020-10-09

## 2020-10-07 RX ORDER — ASPIRIN 81 MG/1
81 TABLET, CHEWABLE ORAL DAILY
Status: DISCONTINUED | OUTPATIENT
Start: 2020-10-07 | End: 2020-10-12 | Stop reason: HOSPADM

## 2020-10-07 RX ADMIN — ACYCLOVIR SODIUM 900 MG: 50 INJECTION, SOLUTION INTRAVENOUS at 19:43

## 2020-10-07 RX ADMIN — DEXTROSE AND SODIUM CHLORIDE: 5; 450 INJECTION, SOLUTION INTRAVENOUS at 19:45

## 2020-10-07 RX ADMIN — SODIUM CHLORIDE, PRESERVATIVE FREE 10 ML: 5 INJECTION INTRAVENOUS at 20:34

## 2020-10-07 RX ADMIN — PANTOPRAZOLE SODIUM 40 MG: 40 INJECTION, POWDER, FOR SOLUTION INTRAVENOUS at 08:31

## 2020-10-07 RX ADMIN — SODIUM CHLORIDE, PRESERVATIVE FREE 10 ML: 5 INJECTION INTRAVENOUS at 14:19

## 2020-10-07 RX ADMIN — LORAZEPAM 0.5 MG: 2 INJECTION INTRAMUSCULAR; INTRAVENOUS at 10:55

## 2020-10-07 RX ADMIN — LORAZEPAM 2 MG: 2 INJECTION INTRAMUSCULAR; INTRAVENOUS at 16:39

## 2020-10-07 RX ADMIN — AMPICILLIN SODIUM 2 G: 2 INJECTION, POWDER, FOR SOLUTION INTRAMUSCULAR; INTRAVENOUS at 20:29

## 2020-10-07 RX ADMIN — LIDOCAINE HYDROCHLORIDE ANHYDROUS 5 ML: 10 INJECTION, SOLUTION INFILTRATION at 11:13

## 2020-10-07 RX ADMIN — AMPICILLIN SODIUM 2 G: 2 INJECTION, POWDER, FOR SOLUTION INTRAMUSCULAR; INTRAVENOUS at 23:40

## 2020-10-07 RX ADMIN — POTASSIUM CHLORIDE 10 MEQ: 7.46 INJECTION, SOLUTION INTRAVENOUS at 22:41

## 2020-10-07 RX ADMIN — DEXTROSE MONOHYDRATE 150 MG: 50 INJECTION, SOLUTION INTRAVENOUS at 11:21

## 2020-10-07 RX ADMIN — AMIODARONE HYDROCHLORIDE 1 MG/MIN: 50 INJECTION, SOLUTION INTRAVENOUS at 11:47

## 2020-10-07 RX ADMIN — METOPROLOL TARTRATE 25 MG: 25 TABLET, FILM COATED ORAL at 14:14

## 2020-10-07 RX ADMIN — VANCOMYCIN HYDROCHLORIDE 1500 MG: 5 INJECTION, POWDER, LYOPHILIZED, FOR SOLUTION INTRAVENOUS at 17:29

## 2020-10-07 RX ADMIN — SODIUM CHLORIDE 6 UNITS/HR: 9 INJECTION, SOLUTION INTRAVENOUS at 20:37

## 2020-10-07 RX ADMIN — SODIUM PHOSPHATE, MONOBASIC, MONOHYDRATE 30 MMOL: 276; 142 INJECTION, SOLUTION INTRAVENOUS at 09:55

## 2020-10-07 RX ADMIN — AMIODARONE HYDROCHLORIDE 0.5 MG/MIN: 50 INJECTION, SOLUTION INTRAVENOUS at 19:42

## 2020-10-07 RX ADMIN — AMPICILLIN SODIUM 2 G: 2 INJECTION, POWDER, FOR SOLUTION INTRAMUSCULAR; INTRAVENOUS at 16:48

## 2020-10-07 RX ADMIN — SODIUM CHLORIDE: 4.5 INJECTION, SOLUTION INTRAVENOUS at 01:37

## 2020-10-07 RX ADMIN — METOPROLOL TARTRATE 25 MG: 25 TABLET, FILM COATED ORAL at 20:33

## 2020-10-07 RX ADMIN — POTASSIUM CHLORIDE 10 MEQ: 7.46 INJECTION, SOLUTION INTRAVENOUS at 21:43

## 2020-10-07 RX ADMIN — ASPIRIN 81 MG: 81 TABLET, CHEWABLE ORAL at 14:13

## 2020-10-07 RX ADMIN — HEPARIN SODIUM 5000 UNITS: 5000 INJECTION INTRAVENOUS; SUBCUTANEOUS at 21:46

## 2020-10-07 RX ADMIN — CEFTRIAXONE SODIUM 2 G: 2 INJECTION, POWDER, FOR SOLUTION INTRAMUSCULAR; INTRAVENOUS at 16:58

## 2020-10-07 RX ADMIN — HEPARIN SODIUM 5000 UNITS: 5000 INJECTION INTRAVENOUS; SUBCUTANEOUS at 05:13

## 2020-10-07 RX ADMIN — DEXTROSE AND SODIUM CHLORIDE: 5; 450 INJECTION, SOLUTION INTRAVENOUS at 12:46

## 2020-10-07 RX ADMIN — DEXTROSE AND SODIUM CHLORIDE: 5; 450 INJECTION, SOLUTION INTRAVENOUS at 05:53

## 2020-10-07 RX ADMIN — POTASSIUM CHLORIDE 10 MEQ: 7.46 INJECTION, SOLUTION INTRAVENOUS at 23:39

## 2020-10-07 ASSESSMENT — PAIN SCALES - WONG BAKER

## 2020-10-07 NOTE — PROGRESS NOTES
Hospitalist Progress Note      Name:  Kenji Gutierrez /Age/Sex: 1950  (78 y.o. male)   MRN & CSN:  [id number] & [id number] Admission Date/Time: 10/6/2020  3:23 PM   Location:  -A PCP: Asad Murphy Day: 2    Assessment and Plan:        ·  DKA likely due to noncompliance  -Patient on insulin infusion  -Endocrine on board     · Altered mental status likely due to acute toxic metabolic encephalopathy  -Due to above, concern for sepsis  -Ammonia elevated, repeats within normal limits  -TSH within normal limits  -CT head nonacute  -Monitor, observe, CPM    · Electrolyte abnormalities  -YEE with hypernatremia vs CKD III, dehydration  -hypophosphatemia, replace       ·  Elevated trop, probably demand ischemia  -Serial troponin stable  -No symptoms  -Developed NSVT  -Started on amiodarone, cardiology on board       · Sepsis , rule out Meningitis  -Leukocytosis  -No clear source of infection  -CT chest/abdomen negative  -CT brain negative  -UA negative  -PCT and CRP ordered  -Start on empiric antibiotics, IV Vanco, ceftriaxone, ampicillin  -IR for lumbar puncture  -Consult ID    ·  Incidental Pulmonary nodule  RUL, 7 mm  Will need outpatient f/u in 6 - 12 months       Chronic medical conditions  - HTN, Continue home meds  - Hypothyroidism- Cont synthroid  -History of CHF, lasix on hold  -Left AKA     Diet Diet NPO Effective Now   DVT Prophylaxis [] Lovenox, []  Heparin, [] SCDs, []No VTE prophylaxis, patient ambulating   GI Prophylaxis [] PPI, [] H2 Blocker, [] No GI prophylaxis, patient is receiving diet/Tube Feeds   Code Status Full Code   Disposition Patient requires continued admission due to    MDM [] Low, [] Moderate,[]  High  Patient's risk as above due to      History of Present Illness:     Pt S&E. Patient lethargic, not able to communicate    10-14 point ROS reviewed negative, unless as noted above    Objective:        Intake/Output Summary (Last 24 hours) at 10/7/2020 1359  Last data filed at 10/7/2020 7965  Gross per 24 hour   Intake 2830.84 ml   Output 1950 ml   Net 880.84 ml      Vitals:   Vitals:    10/07/20 1200   BP: 136/63   Pulse: 111   Resp: 14   Temp: 99.3 °F (37.4 °C)   SpO2: 92%     Physical Exam:    GEN Lethargic, drowsy but arowsable  EYES Pupils are equally round. No scleral erythema, discharge, or conjunctivitis. HENT Mucous membranes are moist.   NECK No apparent thyromegaly or masses. RESP Clear to auscultation, no wheezes, rales or rhonchi. Symmetric chest movement while on room air. CARDIO/VASC S1/S2 auscultated. Regular rate without appreciable murmurs, rubs, or gallops. Peripheral pulses equal bilaterally and palpable. No peripheral edema. GI Abdomen is soft without significant tenderness, masses, or guarding. Bowel sounds are normoactive. Rectal exam deferred.  Lopez catheter is not present. HEME/LYMPH No petechiae or ecchymoses. MSK No gross joint deformities. Spontaneous movement of all extremities. Left AKA  SKIN Normal coloration, warm, dry. NEURO Cranial nerves appear grossly intact, normal speech, no lateralizing weakness. PSYCH Awake, alert, oriented x 1-2. Affect appropriate.     Medications:   Medications:    metoprolol tartrate  25 mg Oral BID    aspirin  81 mg Oral Daily    sodium chloride flush  10 mL Intravenous 2 times per day    heparin (porcine)  5,000 Units Subcutaneous 3 times per day    levothyroxine  175 mcg Oral Daily    losartan  50 mg Oral Daily    pantoprazole  40 mg Intravenous Daily      Infusions:    amiodarone 1 mg/min (10/07/20 1147)    Followed by   [facility] amiodarone      dextrose      sodium bicarbonate infusion 50 mL/hr at 10/06/20 1712    dextrose 5 % and 0.45 % NaCl 150 mL/hr at 10/07/20 1246    insulin 6 Units/hr (10/07/20 0822)     PRN Meds: sodium chloride flush, 10 mL, PRN  glucose, 15 g, PRN  glucagon (rDNA), 1 mg, PRN  dextrose, 100 mL/hr, PRN  dextrose, 12.5 g, PRN  potassium chloride, 10 mEq, PRN  magnesium sulfate, 1 g, PRN  sodium phosphate IVPB, 10 mmol, PRN    Or  sodium phosphate IVPB, 15 mmol, PRN    Or  sodium phosphate IVPB, 20 mmol, PRN  dextrose 5 % and 0.45 % NaCl, , Continuous PRN        Data    Recent Labs     10/06/20  1524 10/07/20  0552   WBC 27.0* 24.5*   HGB 18.3* 16.6   HCT 59.4* 49.5   * 421      Recent Labs     10/07/20  0250 10/07/20  0551 10/07/20  0552 10/07/20  0958     --  144 143   K 4.2  --  4.2 3.8   *  --  111* 111*   CO2 14*  --  17* 19*   PHOS  --  0.9*  --   --    BUN 40*  --  37* 34*   CREATININE 1.1  --  1.0 0.9     Recent Labs     10/06/20  1524 10/06/20  1900   AST 11* 13*   ALT 15 15   BILITOT 0.4 0.4   ALKPHOS 129* 135*     Recent Labs     10/06/20  1524   INR 0.98     No results for input(s): CKTOTAL, CKMB, CKMBINDEX, TROPONINI in the last 72 hours.         Electronically signed by Kirill Youngblood MD on 10/7/2020 at 1:59 PM

## 2020-10-07 NOTE — PROGRESS NOTES
[id number] [place] OF SPEECH/LANGUAGE PATHOLOGY    Ramonita Hposon  10/7/2020  [id number]    Attempted to see Ramonita Hopson for dysphagia evaluation. Pt awake, not appropriately responding, and did not follow directions to participate despite verbal/visual/tactile cues. SLP will reattempt when pt is more appropriate.     Jamilah Vee MA CCC-SLP  10/7/2020  10:14 AM

## 2020-10-07 NOTE — CONSULTS
Endocrinology   Consult Note        Dear Doctor Gissel/ Kee Blevins    Thank You for the Consult     Pt. Was Admitted for : Altered mental state unable to obtain any information from him DKA    Reason for Consult: Control of blood glucose      History Obtained From:  Patient/ EMR       HISTORY OF PRESENT ILLNESS:                The patient is a 78 y.o. male with significant past medical history of unavailable was found to be semiconscious and brought to the emergency room and was in DKA I was  consulted for better control of blood glucose. ROS:   Pt's ROS done in detail. Abnormal ROS are noted in Medical and Surgical History Section below: Other Medical History:    No past medical history on file. Surgical History:    No past surgical history on file. Allergies:  Patient has no allergy information on record. Family History:   No family history on file. REVIEW OF SYSTEMS:  Review of System Done as noted above     PHYSICAL EXAM:      Vitals:    /60   Pulse 70   Temp 96.8 °F (36 °C) (Rectal)   Resp 15   Ht 5' 11\" (1.803 m)   Wt 235 lb 10.8 oz (106.9 kg)   SpO2 97%   BMI 32.87 kg/m²     CONSTITUTIONAL:  awake, but very much confused unable to get any information from him cooperative, appears stated age   EYES:  vision intact Fundoscopic Exam not performed   ENT:Normal  NECK:  Supple, No JVD. Thyroid Exam:Normal   LUNGS:  Has Vesicular Breath Sounds,   CARDIOVASCULAR:  Normal apical impulse, regular rate and rhythm, normal S1 and S2, no S3 or S4, and has no  murmur   ABDOMEN:  No scars, normal bowel sounds, soft, non-distended, non-tender, no masses palpated, no hepatolienomegaly  Musculoskeletal: Normal  Extremities: Normal, peripheral pulses normal, , has no edema   NEUROLOGIC:  Awake, drowsy confused oriented to name, place and time. Cranial nerves II-XII are grossly intact. Motor is  intact. Sensory is intact.  ,  and gait is abnormal.  And unstable    DATA:    CBC:   Recent Labs

## 2020-10-07 NOTE — ED NOTES
Pt daughter Cuco Gaspar called for info, is not listed as a pt contact, she requests that we get ahold of pt emergency contact Sharee Pinzon and ask permission to speak with her. This RN attempted to call Sharee Pinzon without success.     Kaley Burnham's phone number is [phone]     Mayco Colón RN  10/06/20 3855

## 2020-10-07 NOTE — CONSULTS
IV Consult comlpete. Education regarding insertion of PICC reviewed with patient but he is confused at this time. Risk/benefit/and alternatives discussed, unable to  verbalize understanding. Consent given by medical necessity. Time out done at 1035. PICC line inserted right upper arm  without difficulty per protocol. Pt tolerated well. Good blood return and flushes easily. ECG tip confirmation system utilized for tip placement with P wave changes noted by RN during insertion and ECG strips left on chart. Educational booklet left at bedside. Ok to use PICC Inga White RN aware.       Steve Fuentes
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
## 2020-10-07 NOTE — PROGRESS NOTES
Patient arrived to unit from ED via cart. Admission assessment completed. Two nurse skin assessment completed with this RN and Virgilio Roth RN.

## 2020-10-07 NOTE — PROGRESS NOTES
[address] [facility]  consulted by Dr. Subhash Morse for monitoring and adjustment. Indication for treatment: Possible Meningitis  Goal trough: 15 - 20 mcg/mL     Pertinent Laboratory Values:   Temp Readings from Last 3 Encounters:   10/07/20 99.3 °F (37.4 °C) (Rectal)     Recent Labs     10/06/20  1524 10/06/20  1529 10/06/20  1756 10/07/20  0551 10/07/20  0552   WBC 27.0*  --   --   --  24.5*   LACTATE  --  6.7* 4.5* 1.4  --      Recent Labs     10/07/20  0250 10/07/20  0552 10/07/20  0958   BUN 40* 37* 34*   CREATININE 1.1 1.0 0.9     Estimated Creatinine Clearance: 98 mL/min (based on SCr of 0.9 mg/dL). Intake/Output Summary (Last 24 hours) at 10/7/2020 1449  Last data filed at 10/7/2020 0792  Gross per 24 hour   Intake 2830.84 ml   Output 1950 ml   Net 880.84 ml       Pertinent Cultures:  Date    Source    Results  10/7   CSF    Ordered              Vancomycin level:   TROUGH:  No results for input(s): VANCOTROUGH in the last 72 hours. RANDOM:  No results for input(s): VANCORANDOM in the last 72 hours. Assessment:  WBC and temperature: WBC elevated @ 24.5, TMax @ 99.9F  SCr, BUN, and urine output: SCr wnl, good UOP  Day(s) of therapy:  # 1  Vancomycin level: to be collected. Plan:  Dosing comments: started patient on Vancomycin 1500mg IV every 12 hours  Trough level ordered prior to fourth dose  Pharmacy will continue to monitor patient and adjust therapy as indicated    REPEAT VANCOMYCIN TROUGH SCHEDULED FOR 10/9 @ 0338. Thank you for the consult.   Eula Cuadra  10/7/2020 2:49 PM
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
## 2020-10-07 NOTE — ED NOTES
Pt friend Virginia Santos ( friend) [phone] called to check on pt. She is not a point of contact so this RN informed her that we cannot provide information to her, shh voiced understanding but requested that we take her number if patient should wake up and want to talk with her.       Jena Hinojosa RN  10/06/20 2013

## 2020-10-08 ENCOUNTER — APPOINTMENT (OUTPATIENT)
Dept: GENERAL RADIOLOGY | Age: 70
DRG: 871 | End: 2020-10-08
Payer: MEDICARE

## 2020-10-08 LAB
ANION GAP SERPL CALCULATED.3IONS-SCNC: 10 MMOL/L (ref 4–16)
ANION GAP SERPL CALCULATED.3IONS-SCNC: 7 MMOL/L (ref 4–16)
ANION GAP SERPL CALCULATED.3IONS-SCNC: 9 MMOL/L (ref 4–16)
BASOPHILS ABSOLUTE: 0 K/CU MM
BASOPHILS RELATIVE PERCENT: 0.2 % (ref 0–1)
BUN BLDV-MCNC: 10 MG/DL (ref 6–23)
BUN BLDV-MCNC: 11 MG/DL (ref 6–23)
BUN BLDV-MCNC: 14 MG/DL (ref 6–23)
CALCIUM SERPL-MCNC: 8.4 MG/DL (ref 8.3–10.6)
CALCIUM SERPL-MCNC: 8.4 MG/DL (ref 8.3–10.6)
CALCIUM SERPL-MCNC: 8.5 MG/DL (ref 8.3–10.6)
CHLORIDE BLD-SCNC: 102 MMOL/L (ref 99–110)
CHLORIDE BLD-SCNC: 104 MMOL/L (ref 99–110)
CHLORIDE BLD-SCNC: 105 MMOL/L (ref 99–110)
CO2: 23 MMOL/L (ref 21–32)
CO2: 24 MMOL/L (ref 21–32)
CO2: 24 MMOL/L (ref 21–32)
CREAT SERPL-MCNC: 0.6 MG/DL (ref 0.9–1.3)
CREAT SERPL-MCNC: 0.7 MG/DL (ref 0.9–1.3)
CREAT SERPL-MCNC: 0.7 MG/DL (ref 0.9–1.3)
DIFFERENTIAL TYPE: ABNORMAL
EOSINOPHILS ABSOLUTE: 0 K/CU MM
EOSINOPHILS RELATIVE PERCENT: 0.1 % (ref 0–3)
GFR AFRICAN AMERICAN: >60 ML/MIN/1.73M2
GFR NON-AFRICAN AMERICAN: >60 ML/MIN/1.73M2
GLUCOSE BLD-MCNC: 185 MG/DL (ref 70–99)
GLUCOSE BLD-MCNC: 185 MG/DL (ref 70–99)
GLUCOSE BLD-MCNC: 220 MG/DL (ref 70–99)
GLUCOSE BLD-MCNC: 221 MG/DL (ref 70–99)
GLUCOSE BLD-MCNC: 226 MG/DL (ref 70–99)
GLUCOSE BLD-MCNC: 233 MG/DL (ref 70–99)
GLUCOSE BLD-MCNC: 244 MG/DL (ref 70–99)
GLUCOSE BLD-MCNC: 255 MG/DL (ref 70–99)
GLUCOSE BLD-MCNC: 265 MG/DL (ref 70–99)
GLUCOSE BLD-MCNC: 274 MG/DL (ref 70–99)
GLUCOSE BLD-MCNC: 289 MG/DL (ref 70–99)
GLUCOSE BLD-MCNC: 366 MG/DL (ref 70–99)
GLUCOSE BLD-MCNC: 394 MG/DL (ref 70–99)
HCT VFR BLD CALC: 42 % (ref 42–52)
HEMOGLOBIN: 13.5 GM/DL (ref 13.5–18)
IMMATURE NEUTROPHIL %: 0.4 % (ref 0–0.43)
LYMPHOCYTES ABSOLUTE: 0.8 K/CU MM
LYMPHOCYTES RELATIVE PERCENT: 5.1 % (ref 24–44)
MCH RBC QN AUTO: 28 PG (ref 27–31)
MCHC RBC AUTO-ENTMCNC: 32.1 % (ref 32–36)
MCV RBC AUTO: 87.1 FL (ref 78–100)
MONOCYTES ABSOLUTE: 1.2 K/CU MM
MONOCYTES RELATIVE PERCENT: 7.4 % (ref 0–4)
NUCLEATED RBC %: 0 %
PDW BLD-RTO: 13.4 % (ref 11.7–14.9)
PLATELET # BLD: 277 K/CU MM (ref 140–440)
PMV BLD AUTO: 9.1 FL (ref 7.5–11.1)
POTASSIUM SERPL-SCNC: 3.1 MMOL/L (ref 3.5–5.1)
POTASSIUM SERPL-SCNC: 3.1 MMOL/L (ref 3.5–5.1)
POTASSIUM SERPL-SCNC: 3.2 MMOL/L (ref 3.5–5.1)
RBC # BLD: 4.82 M/CU MM (ref 4.6–6.2)
SEGMENTED NEUTROPHILS ABSOLUTE COUNT: 13.5 K/CU MM
SEGMENTED NEUTROPHILS RELATIVE PERCENT: 86.8 % (ref 36–66)
SODIUM BLD-SCNC: 135 MMOL/L (ref 135–145)
SODIUM BLD-SCNC: 135 MMOL/L (ref 135–145)
SODIUM BLD-SCNC: 138 MMOL/L (ref 135–145)
TOTAL IMMATURE NEUTOROPHIL: 0.07 K/CU MM
TOTAL NUCLEATED RBC: 0 K/CU MM
WBC # BLD: 15.6 K/CU MM (ref 4–10.5)

## 2020-10-08 PROCEDURE — 2580000003 HC RX 258: Performed by: INTERNAL MEDICINE

## 2020-10-08 PROCEDURE — 2000000000 HC ICU R&B

## 2020-10-08 PROCEDURE — 2580000003 HC RX 258: Performed by: NURSE PRACTITIONER

## 2020-10-08 PROCEDURE — 80048 BASIC METABOLIC PNL TOTAL CA: CPT

## 2020-10-08 PROCEDURE — 99223 1ST HOSP IP/OBS HIGH 75: CPT | Performed by: INTERNAL MEDICINE

## 2020-10-08 PROCEDURE — 6370000000 HC RX 637 (ALT 250 FOR IP): Performed by: INTERNAL MEDICINE

## 2020-10-08 PROCEDURE — 6360000002 HC RX W HCPCS: Performed by: INTERNAL MEDICINE

## 2020-10-08 PROCEDURE — 94761 N-INVAS EAR/PLS OXIMETRY MLT: CPT

## 2020-10-08 PROCEDURE — 2500000003 HC RX 250 WO HCPCS: Performed by: INTERNAL MEDICINE

## 2020-10-08 PROCEDURE — 86788 WEST NILE VIRUS AB IGM: CPT

## 2020-10-08 PROCEDURE — C9113 INJ PANTOPRAZOLE SODIUM, VIA: HCPCS | Performed by: NURSE PRACTITIONER

## 2020-10-08 PROCEDURE — 6370000000 HC RX 637 (ALT 250 FOR IP): Performed by: NURSE PRACTITIONER

## 2020-10-08 PROCEDURE — 82962 GLUCOSE BLOOD TEST: CPT

## 2020-10-08 PROCEDURE — 6360000002 HC RX W HCPCS: Performed by: NURSE PRACTITIONER

## 2020-10-08 PROCEDURE — 74018 RADEX ABDOMEN 1 VIEW: CPT

## 2020-10-08 PROCEDURE — 86789 WEST NILE VIRUS ANTIBODY: CPT

## 2020-10-08 PROCEDURE — 92610 EVALUATE SWALLOWING FUNCTION: CPT

## 2020-10-08 PROCEDURE — 85025 COMPLETE CBC W/AUTO DIFF WBC: CPT

## 2020-10-08 RX ORDER — LOSARTAN POTASSIUM 25 MG/1
25 TABLET ORAL DAILY
Status: DISCONTINUED | OUTPATIENT
Start: 2020-10-09 | End: 2020-10-12 | Stop reason: HOSPADM

## 2020-10-08 RX ORDER — AMIODARONE HYDROCHLORIDE 200 MG/1
200 TABLET ORAL DAILY
Status: DISCONTINUED | OUTPATIENT
Start: 2020-10-08 | End: 2020-10-08

## 2020-10-08 RX ORDER — AMIODARONE HYDROCHLORIDE 200 MG/1
200 TABLET ORAL DAILY
Status: DISCONTINUED | OUTPATIENT
Start: 2020-10-08 | End: 2020-10-12 | Stop reason: HOSPADM

## 2020-10-08 RX ORDER — DEXTROSE, SODIUM CHLORIDE, AND POTASSIUM CHLORIDE 5; .45; .15 G/100ML; G/100ML; G/100ML
INJECTION INTRAVENOUS CONTINUOUS
Status: DISCONTINUED | OUTPATIENT
Start: 2020-10-08 | End: 2020-10-09

## 2020-10-08 RX ADMIN — AMPICILLIN SODIUM 2 G: 2 INJECTION, POWDER, FOR SOLUTION INTRAMUSCULAR; INTRAVENOUS at 08:09

## 2020-10-08 RX ADMIN — POTASSIUM CHLORIDE, DEXTROSE MONOHYDRATE AND SODIUM CHLORIDE: 150; 5; 450 INJECTION, SOLUTION INTRAVENOUS at 10:25

## 2020-10-08 RX ADMIN — ACYCLOVIR SODIUM 900 MG: 50 INJECTION, SOLUTION INTRAVENOUS at 13:10

## 2020-10-08 RX ADMIN — AMIODARONE HYDROCHLORIDE 200 MG: 200 TABLET ORAL at 17:22

## 2020-10-08 RX ADMIN — ACYCLOVIR SODIUM 900 MG: 50 INJECTION, SOLUTION INTRAVENOUS at 19:50

## 2020-10-08 RX ADMIN — AMPICILLIN SODIUM 2 G: 2 INJECTION, POWDER, FOR SOLUTION INTRAMUSCULAR; INTRAVENOUS at 03:59

## 2020-10-08 RX ADMIN — AMPICILLIN SODIUM 2 G: 2 INJECTION, POWDER, FOR SOLUTION INTRAMUSCULAR; INTRAVENOUS at 12:22

## 2020-10-08 RX ADMIN — POTASSIUM CHLORIDE 10 MEQ: 7.46 INJECTION, SOLUTION INTRAVENOUS at 09:33

## 2020-10-08 RX ADMIN — POTASSIUM CHLORIDE 10 MEQ: 7.46 INJECTION, SOLUTION INTRAVENOUS at 00:40

## 2020-10-08 RX ADMIN — SODIUM CHLORIDE, PRESERVATIVE FREE 10 ML: 5 INJECTION INTRAVENOUS at 08:20

## 2020-10-08 RX ADMIN — LOSARTAN POTASSIUM 50 MG: 50 TABLET, FILM COATED ORAL at 08:06

## 2020-10-08 RX ADMIN — ACYCLOVIR SODIUM 900 MG: 50 INJECTION, SOLUTION INTRAVENOUS at 04:33

## 2020-10-08 RX ADMIN — CEFTRIAXONE SODIUM 2 G: 2 INJECTION, POWDER, FOR SOLUTION INTRAMUSCULAR; INTRAVENOUS at 02:23

## 2020-10-08 RX ADMIN — POTASSIUM CHLORIDE 10 MEQ: 7.46 INJECTION, SOLUTION INTRAVENOUS at 06:34

## 2020-10-08 RX ADMIN — METOPROLOL TARTRATE 25 MG: 25 TABLET, FILM COATED ORAL at 20:02

## 2020-10-08 RX ADMIN — AMPICILLIN SODIUM AND SULBACTAM SODIUM 3 G: 2; 1 INJECTION, POWDER, FOR SOLUTION INTRAMUSCULAR; INTRAVENOUS at 14:19

## 2020-10-08 RX ADMIN — POTASSIUM CHLORIDE 10 MEQ: 7.46 INJECTION, SOLUTION INTRAVENOUS at 08:16

## 2020-10-08 RX ADMIN — POTASSIUM CHLORIDE: 2 INJECTION, SOLUTION, CONCENTRATE INTRAVENOUS at 08:02

## 2020-10-08 RX ADMIN — AMPICILLIN SODIUM AND SULBACTAM SODIUM 3 G: 2; 1 INJECTION, POWDER, FOR SOLUTION INTRAMUSCULAR; INTRAVENOUS at 19:50

## 2020-10-08 RX ADMIN — DEXTROSE AND SODIUM CHLORIDE: 5; 450 INJECTION, SOLUTION INTRAVENOUS at 01:59

## 2020-10-08 RX ADMIN — METOPROLOL TARTRATE 25 MG: 25 TABLET, FILM COATED ORAL at 08:06

## 2020-10-08 RX ADMIN — VANCOMYCIN HYDROCHLORIDE 1500 MG: 5 INJECTION, POWDER, LYOPHILIZED, FOR SOLUTION INTRAVENOUS at 03:57

## 2020-10-08 RX ADMIN — LEVOTHYROXINE SODIUM 175 MCG: 150 TABLET ORAL at 06:20

## 2020-10-08 RX ADMIN — HEPARIN SODIUM 5000 UNITS: 5000 INJECTION INTRAVENOUS; SUBCUTANEOUS at 06:20

## 2020-10-08 RX ADMIN — POTASSIUM CHLORIDE 10 MEQ: 7.46 INJECTION, SOLUTION INTRAVENOUS at 10:39

## 2020-10-08 RX ADMIN — ASPIRIN 81 MG: 81 TABLET, CHEWABLE ORAL at 08:06

## 2020-10-08 RX ADMIN — SODIUM CHLORIDE 6 UNITS/HR: 9 INJECTION, SOLUTION INTRAVENOUS at 19:36

## 2020-10-08 RX ADMIN — PANTOPRAZOLE SODIUM 40 MG: 40 INJECTION, POWDER, FOR SOLUTION INTRAVENOUS at 08:06

## 2020-10-08 RX ADMIN — HEPARIN SODIUM 5000 UNITS: 5000 INJECTION INTRAVENOUS; SUBCUTANEOUS at 21:52

## 2020-10-08 ASSESSMENT — PAIN SCALES - WONG BAKER

## 2020-10-08 NOTE — PROGRESS NOTES
Received a call from lab for critical labs, K and glucose from labs drawn at 530 pm going to do another lab draw to be more accurate.

## 2020-10-08 NOTE — CONSULTS
protein 85, glucose 331. CSF culture negative. HSV PCR in progress. There is been a reduction of leukocytosis down to 15.6.  ? Infectious diseases service was consulted to evaluate the pt, and recommend further investigative and therapeutic measures. Review and summary of old records:  ROS: Other systems reviewed Including eyes, ENT, respiratory, cardiovascular, GI, , dermatologic, neurologic, psych, hem/lymphatic, musculoskeletal and endocrine were negative other than what is mentioned above. Unable to obtain; pt is confused  Patient Active Problem List    Diagnosis Date Noted    DKA, type 2, not at goal [facility]) 10/06/2020     No past medical history on file. No past surgical history on file. No family history on file. Infectious disease related family history - not contibutory. SOCIAL HISTORY  Social History     Tobacco Use    Smoking status: Current Every Day Smoker    Smokeless tobacco: Current User   Substance Use Topics    Alcohol use: Not on file       Born:  [employer] Waldron Occupation:   No recent travel of significance.  No recent unusual exposures.  NO pets    ? ALLERGIES  No Known Allergies   MEDICATIONS  Reviewed and are per the chart/EMR. IMMUNIZATION HISTORY  There is no immunization history for the selected administration types on file for this patient. ? Antibiotics:   Vancomycin  Ceftriaxone  Ampicillin  Acyclovir  ?  -------------------------------------------------------------------------------------------------------------------    Vital Signs:  Vitals:    10/08/20 1100   BP: 135/81   Pulse: 76   Resp: 21   Temp:    SpO2: 96%         Exam:    VS: noted; wt 112.8 kg  Gen: lethargic  Skin: no stigmata of endocarditis  Wounds: C/D/I  HEMT: AT/NC Oropharynx pink, moist, and without lesions or exudates; dentition in good state of repair  Eyes: PERRLA, EOMI, conjunctiva pink, sclera anicteric. Neck: Supple. Trachea midline. No LAD. Chest: no distress and CTA.  Good air movement. Heart: RRR and no MRG. Abd: soft, non-distended, no tenderness, no hepatomegaly. Normoactive bowel sounds. Ext: left AKA, nil pedal edema  Catheter Site: without erythema or tenderness  Neuro: CN 2-12 intact and no focal sensory or motor deficits    ? Diagnostic Studies: reviewed  ? ? I have examined this patient and available medical records on this date and have made the above observations, conclusions and recommendations.   Electronically signed by: Electronically signed by Cirstian Osullivan MD on 10/8/2020 at 11:51 AM

## 2020-10-08 NOTE — PROGRESS NOTES
Progress Note( Dr. Carlos Madera)  10/7/2020  Subjective:   Admit Date: 10/6/2020  PCP: Aguila Nesbitt DO    Admitted For : Altered mental state patient was in DKA    Consulted For: Rate control of blood glucose    Interval History: Though he seemed to be somewhat better still confused unable to get any information from him    Denies any chest pains,   Denies SOB . Denies nausea or vomiting. No new bowel or bladder symptoms. Intake/Output Summary (Last 24 hours) at 10/7/2020 2152  Last data filed at 10/7/2020 2034  Gross per 24 hour   Intake 6275.84 ml   Output 1950 ml   Net 4325.84 ml       DATA    CBC:   Recent Labs     10/06/20  1524 10/07/20  0552   WBC 27.0* 24.5*   HGB 18.3* 16.6   * 421    CMP:  Recent Labs     10/06/20  1524 10/06/20  1900  10/07/20  1330 10/07/20  1730 10/07/20  2021   * 137   < > 133* 136 138   K 6.2* 5.6*   < > 3.9 2.9* 2.9*   CL 86* 94*   < > 102 96* 106   CO2 5* 5*   < > 20* 20* 23   BUN 44* 42*   < > 28* 24* 23   CREATININE 1.6* 1.4*   < > 0.8* 0.8* 0.8*   CALCIUM 10.6 10.2   < > 8.6 7.5* 8.6   PROT 7.9 7.4  --   --   --   --    LABALBU 3.5 3.8  --   --   --   --    BILITOT 0.4 0.4  --   --   --   --    ALKPHOS 129* 135*  --   --   --   --    AST 11* 13*  --   --   --   --    ALT 15 15  --   --   --   --     < > = values in this interval not displayed. Lipids: No results found for: CHOL, HDL, TRIG  Glucose:  Recent Labs     10/07/20  1633 10/07/20  1829 10/07/20  2036   POCGLU 245* 318* 307*     LsvrnmkxnwM1C:No results found for: LABA1C  High Sensitivity TSH:   Lab Results   Component Value Date    TSHHS 3.490 10/06/2020     Free T3: No results found for: FT3  Free T4:No results found for: T4FREE    Ct Head Wo Contrast    Result Date: 10/6/2020  EXAMINATION: CT OF THE HEAD WITHOUT CONTRAST  10/6/2020 3:44 pm     No acute intracranial abnormality.      Ct Abdomen Pelvis W Iv Contrast Additional Contrast? None    Result Date: 10/6/2020  EXAMINATION: CT OF THE ABDOMEN AND PELVIS WITH CONTRAST 10/6/2020 4:07 pm    No acute abnormality noted in the abdomen or pelvis. Cta Pulmonary W Contrast    Result Date: 10/6/2020  EXAMINATION: CTA OF THE CHEST 10/6/2020 5:01 pm     Extensive cardiac and respiratory motion through the hilar regions challenge evaluation. No definite central emboli identified. Up to 7 mm nodule right upper lobe. Fleischner Society guidelines for follow-up and management of incidentally detected pulmonary nodules: Single Solid Nodule: Nodule size less than 6 mm In a low-risk patient, no routine follow-up. In a high-risk patient, optional CT at 12 months. Nodule size equals 6-8 mm In a low-risk patient, CT at 6-12 months, then consider CT at 18-24 months. In a high-risk patient, CT at 6-12 months, then CT at 18-24 months. Nodule size greater than 8 mm In a low-risk patient, consider CT at 3 months, PET/CT, or tissue sampling. In a high-risk patient, consider CT at 3 months, PET/CT, or tissue sampling. Multiple Solid Nodules: Nodule size less than 6 mm In a low-risk patient, no routine follow-up. In a high-risk patient, optional CT at 12 months. Nodule size equals 6-8 mm In a low-risk patient, CT at 3-6 months, then consider CT at 18-24 months. In a high-risk patient, CT at 3-6 months, then CT at 18-24 months. Nodule size greater than 8 mm In a low-risk patient, CT at 3-6 months, then consider CT at 18-24 months. In a high-risk patient, CT at 3-6 months, then CT at 18-24 months. - Low risk patients include individuals with minimal or absent history of smoking and other known risk factors. - High risk patients include individuals with a history or smoking or known risk factors. Radiology 2017 http://pubs. rsna.org/doi/full/10.1148/radiol. 9893465061     Xr Abdomen For Ng/og/ne Tube Placement    Result Date: 10/7/2020  EXAMINATION: ONE SUPINE XRAY VIEW(S) OF THE ABDOMEN 10/7/2020 12:31 pm COMPARISON: None.  HISTORY: ORDERING SYSTEM PROVIDED HISTORY: Confirmation of course of NG/OG/NE tube and location of tip of tube TECHNOLOGIST PROVIDED HISTORY: Reason for exam:->Confirmation of course of NG/OG/NE tube and location of tip of tube Portable? ->Yes Reason for Exam: Confirmation of course of NG/OG/NE tube and location of tip of tube FINDINGS: Enteric catheter is present. The side port is at the level of the GE junction. Tip projects over the gastric fundus. Right mid lung opacification. Enteric catheter with side port at the level of GE junction. Recommend advancing 3-4 cm to ensure side port is well below the GE junction.          Scheduled Medicines   Medications:    metoprolol tartrate  25 mg Oral BID    aspirin  81 mg Oral Daily    sodium chloride flush  10 mL Intravenous 2 times per day    sodium chloride flush  10 mL Intravenous 2 times per day    cefTRIAXone (ROCEPHIN) IV  2 g Intravenous Q12H    ampicillin IV  2 g Intravenous 6 times per day    vancomycin  1,500 mg Intravenous Q12H    acyclovir  10 mg/kg (Adjusted) Intravenous Q8H    heparin (porcine)  5,000 Units Subcutaneous 3 times per day    levothyroxine  175 mcg Oral Daily    losartan  50 mg Oral Daily    pantoprazole  40 mg Intravenous Daily      Infusions:    amiodarone 0.5 mg/min (10/07/20 1942)    dextrose      sodium bicarbonate infusion 50 mL/hr at 10/06/20 1712    dextrose 5 % and 0.45 % NaCl 150 mL/hr at 10/07/20 1945    insulin 6 Units/hr (10/07/20 2037)         Objective:   Vitals: /69   Pulse 99   Temp 99.9 °F (37.7 °C) (Rectal)   Resp (!) 2   Ht 5' 11\" (1.803 m)   Wt 235 lb 10.8 oz (106.9 kg)   SpO2 93%   BMI 32.87 kg/m²   General appearance: alert and little more cooperative with exam confused  Neck: no JVD or bruit  Thyroid : Normal lobes   Lungs: Has Vesicular Breath sounds   Heart:  regular rate and rhythm  Abdomen: soft, non-tender; bowel sounds normal; no masses,  no organomegaly  Musculoskeletal: Normal  Extremities: extremities normal, , no edema  Neurologic:  Awake, drowsy unable to get any information oriented to name, not to place and time. Cranial nerves II-XII are grossly intact. Motor weakness. Sensory is intact. ,  and gait is normal.    Assessment:     Patient Active Problem List:     DKA, type 2, not at goal [facility])     Altered mental state      Plan:     1. Reviewed POC blood glucose . Labs and X ray results   2. Reviewed Current Medicines   3. On IV insulin infusion drip protocol   4. Monitor Blood glucose frequently   5. Modified  the dose of Insulin/ other medicines as needed   6. Patient had normal cardiac arrhythmias. Cardiology to be consulted  7. Will follow     .      Miki Irene MD

## 2020-10-08 NOTE — PROGRESS NOTES
Speech Language Pathology  Facility/Department: [facility] ICU   CLINICAL BEDSIDE SWALLOW EVALUATION    NAME: Zahida Roberts  : 1950  MRN: [id number]    IMPRESSIONS: Zhaida Roberts was referred for a bedside swallow evaluation after being admitted to [facility] with DKA, encephalopathy. Medical hx includes DM, hypothyroidism, HTN. No known history of dysphagia prior to admission. Pt seen for evaluation seated upright in bed, alert. He required maximal cuing/encouragement for limited participation. He did not follow directions to complete oral mechanism examination. He accepted PO trials of thin liquids via tsp/cup/straw, nectar thick liquids via tsp/straw, and puree. Oral stage with reduced coordination for bolus hold with premature posterior loss of liquids. Suspect pharyngeal dysphagia with delayed swallow initiation. Immediate strong cough response noted following trials of thin liquids via cup and straw. No additional s/s aspiration noted. Pt's reduced attention to task significantly impacted safety with PO intake. Recommend pureed diet/nectar thick liquids with strict aspiration precautions. SLP will follow to monitor/advance as indicated.  Recommendations/plan d/w RN.    ADMISSION DATE: 10/6/2020  ADMITTING DIAGNOSIS: has DKA, type 2, not at goal [facility]) on their problem list.  ONSET DATE: this admission    Recent Chest Xray/CT of Chest: see chart    Date of Eval: 10/8/2020  Evaluating Therapist: Elayne Rogers    Current Diet level:  Current Diet : NPO  Current Liquid Diet : NPO      Primary Complaint  Patient Complaint: AMS    Pain:  Pain Assessment  Pain Assessment: Respiratory Rate >10  Davis-Perales Pain Rating: No hurt  RASS Score: Alert and calm    Reason for Referral  Zahida Roberts was referred for a bedside swallow evaluation to assess the efficiency of his swallow function, identify signs and symptoms of aspiration and make recommendations regarding safe dietary consistencies, effective compensatory strategies, and safe eating environment. Impression  Dysphagia Diagnosis: Mild to moderate oral stage dysphagia;Mild pharyngeal stage dysphagia  Dysphagia Outcome Severity Scale: Level 4: Mild moderate dysphagia- Intermittent supervision/cueing. One - two diet consistencies restricted     Treatment Plan  Requires SLP Intervention: Yes  Duration/Frequency of Treatment: 3-5x/week for LOS  D/C Recommendations: To be determined       Recommended Diet and Intervention  Diet Solids Recommendation: Dysphagia Pureed (Dysphagia I)  Liquid Consistency Recommendation: Mildly Thick (Nectar)  Recommended Form of Meds: PO     Therapeutic Interventions: Diet tolerance monitoring;Patient/Family education    Compensatory Swallowing Strategies  Compensatory Swallowing Strategies: Upright as possible for all oral intake; Total feed;Small bites/sips    Treatment/Goals  Short-term Goals  Timeframe for Short-term Goals: length of admission  Goal 1: Pt will tolerate pureed diet/nectar thick liquids with adequate oral manipulation and no s/s aspiration. Goal 2: Pt will tolerate PO trials of advanced textures with adequate oral manipulation and no s/s aspiration. Goal 3: Pt/caregivers will indicate understanding of all recommendations. General  Chart Reviewed: Yes  Behavior/Cognition: Alert; Requires cueing;Doesn't follow directions  Communication Observation: (cognitive communication deficits)  Follows Directions: None  Dentition: Poor dental/oral hygeine  Patient Positioning: Upright in bed  Prior Dysphagia History: none known prior to admission  Consistencies Administered: Dysphagia Pureed (Dysphagia I); Nectar - teaspoon;Nectar - straw; Thin - teaspoon; Thin - cup; Thin - straw           Vision/Hearing  Hearing  Hearing: Within functional limits    Oral Motor Deficits  Oral/Motor  Oral Motor: Exceptions to [facility]    Oral Phase Dysfunction  Oral Phase  Oral Phase: Exceptions     Indicators of Pharyngeal Phase Dysfunction   Pharyngeal Phase  Pharyngeal Phase: Exceptions    Prognosis  Prognosis  Prognosis for safe diet advancement: good  Individuals consulted  Consulted and agree with results and recommendations: RN    Education  Patient Education: recommendations/plan  Patient Education Response: No evidence of learning  Safety Devices in place: Yes  Type of devices:  All fall risk precautions in place       Therapy Time  SLP Individual Minutes  Time In: 1310  Time Out: [address]  Minutes: [address] [facility], [address] [place] [address] Road  10/8/2020 4:44 PM

## 2020-10-08 NOTE — PROGRESS NOTES
Hospitalist Progress Note      Name:  Rajan Khan /Age/Sex: 1950  (78 y.o. male)   MRN & CSN:  [id number] & [id number] Admission Date/Time: 10/6/2020  3:23 PM   Location:  -A PCP: Asad Garza 60 Day: 3    Assessment and Plan:   Rajan Khan is a 78 y.o.  male  who presents with altered mental status.     ·  DKA likely due to noncompliance-improving  -Patient on insulin infusion  -Gap closed  -BMP every 4  -Endocrine on board     · Altered mental status due to acute toxic metabolic encephalopathy  -Due to above, concern for sepsis  -Ammonia elevated, repeats within normal limits  -TSH within normal limits  -CT head nonacute  -Monitor, observe, CPM     · Sepsis , possible viral meningitis at this post lumbar puncture on 10/07/2020  -Leukocytosis improving  -No clear source of infection  -CT chest/abdomen negative  -CT brain negative  -UA negative  -PCT and CRP ordered  -On empiric antibiotics, IV Vanco, ceftriaxone, ampicillin  -IV acyclovir added yesterday  -IR for lumbar puncture, CSF analysis suggestive more of viral  -ID on board, evaluation pending    · Electrolyte abnormalities  -YEE with hypernatremia vs CKD III, dehydration  -BMP every 4, replace electrolytes as needed  -hypophosphatemia, replace        ·  Elevated trop, probably demand ischemia  -Serial troponin stable  -No symptoms  -Developed NSVT  -Started on amiodarone and metoprolol, cardiology on board       ·  Incidental Pulmonary nodule  RUL, 7 mm  Will need outpatient f/u in 6 - 12 months        Chronic medical conditions  - HTN, Continue home meds  - Hypothyroidism- Cont synthroid  -History of CHF, lasix on hold  -Left AKA  Diet Diet NPO Effective Now   DVT Prophylaxis [] Lovenox, []  Heparin, [] SCDs, []No VTE prophylaxis, patient ambulating   GI Prophylaxis [] PPI, [] H2 Blocker, [] No GI prophylaxis, patient is receiving diet/Tube Feeds   Code Status Full Code   Disposition Patient requires continued admission due to    MDM [] Low, [] Moderate,[]  High  Patient's risk as above due to      History of Present Illness:     Pt S&E. Patient still lethargic, mildly improved from yesterday    10-14 point ROS reviewed negative, unless as noted above    Objective: Intake/Output Summary (Last 24 hours) at 10/8/2020 1204  Last data filed at 10/8/2020 0407  Gross per 24 hour   Intake 6285.59 ml   Output 1800 ml   Net 4485.59 ml      Vitals:   Vitals:    10/08/20 1100   BP: 135/81   Pulse: 76   Resp: 21   Temp:    SpO2: 96%     Physical Exam:    GEN lethargic, drowsy but arousable  EYES Pupils are equally round. No scleral erythema, discharge, or conjunctivitis. HENT Mucous membranes are moist.   NECK No apparent thyromegaly or masses. No neck stiffness appreciated  RESP Clear to auscultation, no wheezes, rales or rhonchi. Symmetric chest movement while on room air. CARDIO/VASC S1/S2 auscultated. Regular rate without appreciable murmurs, rubs, or gallops. Peripheral pulses equal bilaterally and palpable. No peripheral edema. GI Abdomen is soft without significant tenderness, masses, or guarding. Bowel sounds are normoactive. Rectal exam deferred.  Lopez catheter is present. HEME/LYMPH No petechiae or ecchymoses. MSK No gross joint deformities. Spontaneous movement of all extremities. Left AKA  SKIN Normal coloration, warm, dry. NEURO Cranial nerves appear grossly intact, normal speech, no lateralizing weakness. PSYCH Awake, alert, oriented x 1-2  Affect appropriate.     Medications:   Medications:    [START ON 10/9/2020] influenza virus vaccine  0.5 mL Intramuscular Once    [START ON 10/9/2020] losartan  25 mg Oral Daily    amiodarone  200 mg Oral Daily    metoprolol tartrate  25 mg Oral BID    aspirin  81 mg Oral Daily    sodium chloride flush  10 mL Intravenous 2 times per day    sodium chloride flush  10 mL Intravenous 2 times per day    cefTRIAXone (ROCEPHIN) IV  2 g

## 2020-10-08 NOTE — PROGRESS NOTES
Patient removed NG. Patient speaking with nurse and able to answer questions. Patient unable to follow commands or take pill by mouth at this time. NG placed 18 fr in left nostril at 60 cm secured with tape. Notified providers of need for KUB to confirm placement to give amiodarone dose. Patient bilateral wrist restraints secured. Pt appears to be in a comfortable position.

## 2020-10-08 NOTE — PROGRESS NOTES
[address] [facility]  consulted by Dr. Holden Kaufman for monitoring and adjustment. Indication for treatment: r/o meningitis  Goal trough: 15 mcg/mL     Pertinent Laboratory Values:   Temp Readings from Last 3 Encounters:   10/08/20 99.9 °F (37.7 °C) (Rectal)     Recent Labs     10/06/20  1524 10/06/20  1529 10/06/20  1756 10/07/20  0551 10/07/20  0552   WBC 27.0*  --   --   --  24.5*   LACTATE  --  6.7* 4.5* 1.4  --      Recent Labs     10/07/20  1730 10/07/20  2021 10/08/20  0536   BUN 24* 23 14   CREATININE 0.8* 0.8* 0.7*     Estimated Creatinine Clearance: 129 mL/min (A) (based on SCr of 0.7 mg/dL (L)). Intake/Output Summary (Last 24 hours) at 10/8/2020 0938  Last data filed at 10/8/2020 0407  Gross per 24 hour   Intake 6285.59 ml   Output 1800 ml   Net 4485.59 ml       Pertinent Cultures:  Date    Source    Results  10/7   CSF    Ordered              Vancomycin level:   TROUGH:  No results for input(s): VANCOTROUGH in the last 72 hours. RANDOM:  No results for input(s): VANCORANDOM in the last 72 hours. Assessment:  · WBC and temperature: WBC elevated, febrile  · SCr, BUN, and urine output: stable   · Day(s) of therapy:  2  · Vancomycin level: to be collected    Plan:  · Vancomycin 1500mg q12h  · Trough level ordered prior to fourth dose  · Pharmacy will continue to monitor patient and adjust therapy as indicated    REPEAT VANCOMYCIN TROUGH SCHEDULED FOR 10/9 @ 0330. Thank you for the consult.   Viki Beckwith, [address]  10/8/2020 9:38 AM

## 2020-10-08 NOTE — PLAN OF CARE
Discharge Planning:  Goal: Ability to perform activities of daily living will improve  Description: Ability to perform activities of daily living will improve  10/8/2020 1051 by Darcy Chapa RN  Outcome: Ongoing  10/8/2020 0213 by Melissa Pinto. YUE Mcghee  Outcome: Ongoing  Goal: Participates in care planning  Description: Participates in care planning  10/8/2020 1051 by Darcy Chapa RN  Outcome: Ongoing  10/8/2020 0213 by Melissa Pinto. YUE Mcghee  Outcome: Ongoing     Problem: Injury - Risk of, Physical Injury:  Goal: Will remain free from falls  Description: Will remain free from falls  10/8/2020 1051 by Darcy Chapa RN  Outcome: Ongoing  10/8/2020 0213 by Melissa Pinto. YUE Mcghee  Outcome: Ongoing  Goal: Absence of physical injury  Description: Absence of physical injury  10/8/2020 1051 by Darcy Chapa RN  Outcome: Ongoing  10/8/2020 0213 by Melissa Pinto. YUE Mcghee  Outcome: Ongoing     Problem: Mood - Altered:  Goal: Mood stable  Description: Mood stable  10/8/2020 1051 by Darcy Chapa RN  Outcome: Ongoing  10/8/2020 0213 by Melissa Pinto. YUE Mcghee  Outcome: Ongoing  Goal: Absence of abusive behavior  Description: Absence of abusive behavior  10/8/2020 1051 by Darcy Chapa RN  Outcome: Ongoing  10/8/2020 0213 by Melissa Pinto. YUE Mcghee  Outcome: Ongoing  Goal: Verbalizations of feeling emotionally comfortable while being cared for will increase  Description: Verbalizations of feeling emotionally comfortable while being cared for will increase  10/8/2020 1051 by Darcy Chapa RN  Outcome: Ongoing  10/8/2020 0213 by Melissa Pinto. YUE Mcghee  Outcome: Ongoing     Problem: Psychomotor Activity - Altered:  Goal: Absence of psychomotor disturbance signs and symptoms  Description: Absence of psychomotor disturbance signs and symptoms  10/8/2020 1051 by Darcy Chapa RN  Outcome: Ongoing  10/8/2020 0213 by Melissa Pinto.  YUE Mcghee  Outcome: Ongoing     Problem: Sensory Perception - Impaired:  Goal: Demonstrations of improved sensory functioning will increase  Description: Demonstrations of improved sensory functioning will increase  10/8/2020 1051 by Moustapha De La Rosa RN  Outcome: Ongoing  10/8/2020 0213 by Santana Go. YUE Mcghee  Outcome: Ongoing  Goal: Decrease in sensory misperception frequency  Description: Decrease in sensory misperception frequency  10/8/2020 1051 by Moustapha De La Rosa RN  Outcome: Ongoing  10/8/2020 0213 by Santana Go. YUE Mcghee  Outcome: Ongoing  Goal: Able to refrain from responding to false sensory perceptions  Description: Able to refrain from responding to false sensory perceptions  10/8/2020 1051 by Moustapha De La Rosa RN  Outcome: Ongoing  10/8/2020 0213 by Santana Go. YUE Mcghee  Outcome: Ongoing  Goal: Demonstrates accurate environmental perceptions  Description: Demonstrates accurate environmental perceptions  10/8/2020 1051 by Moustapha De La Rosa RN  Outcome: Ongoing  10/8/2020 0213 by Santana Go. YUE Mcghee  Outcome: Ongoing  Goal: Able to distinguish between reality-based and nonreality-based thinking  Description: Able to distinguish between reality-based and nonreality-based thinking  10/8/2020 1051 by Moustapha De La Rosa RN  Outcome: Ongoing  10/8/2020 0213 by Santana Go. YUE Mcghee  Outcome: Ongoing  Goal: Able to interrupt nonreality-based thinking  Description: Able to interrupt nonreality-based thinking  10/8/2020 1051 by Moustapha De La Rosa RN  Outcome: Ongoing  10/8/2020 0213 by Santana Go. YUE Mcghee  Outcome: Ongoing     Problem: Sleep Pattern Disturbance:  Goal: Appears well-rested  Description: Appears well-rested  10/8/2020 1051 by Moustapha De La Rosa RN  Outcome: Ongoing  10/8/2020 0213 by Santana Go.  YUE Mcghee  Outcome: Ongoing

## 2020-10-08 NOTE — PROGRESS NOTES
per day    vancomycin  1,500 mg Intravenous Q12H    acyclovir  10 mg/kg (Adjusted) Intravenous Q8H    heparin (porcine)  5,000 Units Subcutaneous 3 times per day    levothyroxine  175 mcg Oral Daily    losartan  50 mg Oral Daily    pantoprazole  40 mg Intravenous Daily      Infusions:   dextrose 5% and 0.45% NaCl with KCl 20 mEq      amiodarone 0.5 mg/min (10/07/20 1942)    dextrose      dextrose 5 % and 0.45 % NaCl 150 mL/hr at 10/08/20 0159    insulin 6 Units/hr (10/08/20 0805)      PRN Meds:  sodium chloride flush, sodium chloride flush, glucose, glucagon (rDNA), dextrose, dextrose, potassium chloride, magnesium sulfate, sodium phosphate IVPB **OR** sodium phosphate IVPB **OR** sodium phosphate IVPB, dextrose 5 % and 0.45 % NaCl       Physical Exam:  Vitals:    10/08/20 0900   BP: 136/70   Pulse: 84   Resp: 16   Temp:    SpO2: 93%        General: awake alert   Chest: Nontender  Cardiac: sinus  Lungs:Clear to auscultation and percussion. Abdomen: Soft, NT, ND, +BS  Extremities: amputation  Left leg  Vascular:  Equal 2+ peripheral pulses. Lab Data:  CBC:   Recent Labs     10/06/20  1524 10/07/20  0552 10/08/20  0830   WBC 27.0* 24.5* 15.6*   HGB 18.3* 16.6 13.5   HCT 59.4* 49.5 42.0   MCV 94.4 86.8 87.1   * 421 277     BMP:   Recent Labs     10/07/20  0551  10/07/20  1730 10/07/20  2021 10/08/20  0536   NA  --    < > 136 138 138   K  --    < > 2.9* 2.9* 3.2*   CL  --    < > 96* 106 105   CO2  --    < > 20* 23 23   PHOS 0.9*  --   --   --   --    BUN  --    < > 24* 23 14   CREATININE  --    < > 0.8* 0.8* 0.7*    < > = values in this interval not displayed.      LIVER PROFILE:   Recent Labs     10/06/20  1524 10/06/20  1900   AST 11* 13*   ALT 15 15   BILITOT 0.4 0.4   ALKPHOS 129* 135*     PT/INR:   Recent Labs     10/06/20  1524 10/07/20  1425   PROTIME 11.8 12.5   INR 0.98 1.03     APTT:   Recent Labs     10/07/20  1425   APTT 19.0*     BNP:  No results for input(s): BNP in the last 72 hours.      Assessment:  Patient Active Problem List    Diagnosis Date Noted    DKA, type 2, not at goal [facility]) 10/06/2020       Dasha Palmer MD 10/8/2020 10:12 AM

## 2020-10-09 PROBLEM — A41.9 SEPSIS WITH ENCEPHALOPATHY WITHOUT SEPTIC SHOCK (HCC): Status: ACTIVE | Noted: 2020-10-09

## 2020-10-09 PROBLEM — G93.40 SEPSIS WITH ENCEPHALOPATHY WITHOUT SEPTIC SHOCK (HCC): Status: ACTIVE | Noted: 2020-10-09

## 2020-10-09 PROBLEM — R65.20 SEPSIS WITH ENCEPHALOPATHY WITHOUT SEPTIC SHOCK (HCC): Status: ACTIVE | Noted: 2020-10-09

## 2020-10-09 LAB
ANION GAP SERPL CALCULATED.3IONS-SCNC: 10 MMOL/L (ref 4–16)
ANION GAP SERPL CALCULATED.3IONS-SCNC: 7 MMOL/L (ref 4–16)
BASOPHILS ABSOLUTE: 0 K/CU MM
BASOPHILS RELATIVE PERCENT: 0.2 % (ref 0–1)
BUN BLDV-MCNC: 6 MG/DL (ref 6–23)
BUN BLDV-MCNC: 6 MG/DL (ref 6–23)
CALCIUM SERPL-MCNC: 8.6 MG/DL (ref 8.3–10.6)
CALCIUM SERPL-MCNC: 8.6 MG/DL (ref 8.3–10.6)
CHLORIDE BLD-SCNC: 104 MMOL/L (ref 99–110)
CHLORIDE BLD-SCNC: 106 MMOL/L (ref 99–110)
CO2: 24 MMOL/L (ref 21–32)
CO2: 26 MMOL/L (ref 21–32)
CREAT SERPL-MCNC: 0.6 MG/DL (ref 0.9–1.3)
CREAT SERPL-MCNC: 0.7 MG/DL (ref 0.9–1.3)
DIFFERENTIAL TYPE: ABNORMAL
EOSINOPHILS ABSOLUTE: 0.1 K/CU MM
EOSINOPHILS RELATIVE PERCENT: 0.3 % (ref 0–3)
ERYTHROCYTE SEDIMENTATION RATE: 42 MM/HR (ref 0–20)
GFR AFRICAN AMERICAN: >60 ML/MIN/1.73M2
GFR AFRICAN AMERICAN: >60 ML/MIN/1.73M2
GFR NON-AFRICAN AMERICAN: >60 ML/MIN/1.73M2
GFR NON-AFRICAN AMERICAN: >60 ML/MIN/1.73M2
GLUCOSE BLD-MCNC: 150 MG/DL (ref 70–99)
GLUCOSE BLD-MCNC: 178 MG/DL (ref 70–99)
GLUCOSE BLD-MCNC: 184 MG/DL (ref 70–99)
GLUCOSE BLD-MCNC: 205 MG/DL (ref 70–99)
GLUCOSE BLD-MCNC: 211 MG/DL (ref 70–99)
GLUCOSE BLD-MCNC: 215 MG/DL (ref 70–99)
GLUCOSE BLD-MCNC: 215 MG/DL (ref 70–99)
GLUCOSE BLD-MCNC: 233 MG/DL (ref 70–99)
GLUCOSE BLD-MCNC: 248 MG/DL (ref 70–99)
GLUCOSE BLD-MCNC: 253 MG/DL (ref 70–99)
GLUCOSE BLD-MCNC: 282 MG/DL (ref 70–99)
HCT VFR BLD CALC: 42.1 % (ref 42–52)
HEMOGLOBIN: 14.1 GM/DL (ref 13.5–18)
HIGH SENSITIVE C-REACTIVE PROTEIN: 138.4 MG/L
HSV BY PCR: NORMAL
IMMATURE NEUTROPHIL %: 0.6 % (ref 0–0.43)
LYMPHOCYTES ABSOLUTE: 1 K/CU MM
LYMPHOCYTES RELATIVE PERCENT: 6.6 % (ref 24–44)
MAGNESIUM: 1.7 MG/DL (ref 1.8–2.4)
MCH RBC QN AUTO: 28.5 PG (ref 27–31)
MCHC RBC AUTO-ENTMCNC: 33.5 % (ref 32–36)
MCV RBC AUTO: 85.2 FL (ref 78–100)
MONOCYTES ABSOLUTE: 1 K/CU MM
MONOCYTES RELATIVE PERCENT: 6.5 % (ref 0–4)
NUCLEATED RBC %: 0 %
PDW BLD-RTO: 13.4 % (ref 11.7–14.9)
PHOSPHORUS: 0.8 MG/DL (ref 2.5–4.9)
PLATELET # BLD: 264 K/CU MM (ref 140–440)
PMV BLD AUTO: 9.2 FL (ref 7.5–11.1)
POTASSIUM SERPL-SCNC: 3.2 MMOL/L (ref 3.5–5.1)
POTASSIUM SERPL-SCNC: 3.4 MMOL/L (ref 3.5–5.1)
PROCALCITONIN: 0.08
RBC # BLD: 4.94 M/CU MM (ref 4.6–6.2)
SEGMENTED NEUTROPHILS ABSOLUTE COUNT: 12.9 K/CU MM
SEGMENTED NEUTROPHILS RELATIVE PERCENT: 85.8 % (ref 36–66)
SODIUM BLD-SCNC: 138 MMOL/L (ref 135–145)
SODIUM BLD-SCNC: 139 MMOL/L (ref 135–145)
TOTAL IMMATURE NEUTOROPHIL: 0.09 K/CU MM
TOTAL NUCLEATED RBC: 0 K/CU MM
WBC # BLD: 15 K/CU MM (ref 4–10.5)

## 2020-10-09 PROCEDURE — 85025 COMPLETE CBC W/AUTO DIFF WBC: CPT

## 2020-10-09 PROCEDURE — 83735 ASSAY OF MAGNESIUM: CPT

## 2020-10-09 PROCEDURE — 2580000003 HC RX 258: Performed by: INTERNAL MEDICINE

## 2020-10-09 PROCEDURE — 6360000002 HC RX W HCPCS: Performed by: INTERNAL MEDICINE

## 2020-10-09 PROCEDURE — 6360000002 HC RX W HCPCS: Performed by: NURSE PRACTITIONER

## 2020-10-09 PROCEDURE — C9113 INJ PANTOPRAZOLE SODIUM, VIA: HCPCS | Performed by: NURSE PRACTITIONER

## 2020-10-09 PROCEDURE — 92526 ORAL FUNCTION THERAPY: CPT

## 2020-10-09 PROCEDURE — 6370000000 HC RX 637 (ALT 250 FOR IP): Performed by: NURSE PRACTITIONER

## 2020-10-09 PROCEDURE — 82962 GLUCOSE BLOOD TEST: CPT

## 2020-10-09 PROCEDURE — 80048 BASIC METABOLIC PNL TOTAL CA: CPT

## 2020-10-09 PROCEDURE — 84145 PROCALCITONIN (PCT): CPT

## 2020-10-09 PROCEDURE — 99232 SBSQ HOSP IP/OBS MODERATE 35: CPT | Performed by: INTERNAL MEDICINE

## 2020-10-09 PROCEDURE — 84100 ASSAY OF PHOSPHORUS: CPT

## 2020-10-09 PROCEDURE — 6370000000 HC RX 637 (ALT 250 FOR IP): Performed by: INTERNAL MEDICINE

## 2020-10-09 PROCEDURE — 86141 C-REACTIVE PROTEIN HS: CPT

## 2020-10-09 PROCEDURE — 85652 RBC SED RATE AUTOMATED: CPT

## 2020-10-09 PROCEDURE — 1200000000 HC SEMI PRIVATE

## 2020-10-09 PROCEDURE — 92610 EVALUATE SWALLOWING FUNCTION: CPT

## 2020-10-09 PROCEDURE — 2500000003 HC RX 250 WO HCPCS: Performed by: INTERNAL MEDICINE

## 2020-10-09 RX ORDER — POTASSIUM CHLORIDE 20 MEQ/1
10 TABLET, EXTENDED RELEASE ORAL 2 TIMES DAILY
Status: DISCONTINUED | OUTPATIENT
Start: 2020-10-09 | End: 2020-10-12 | Stop reason: HOSPADM

## 2020-10-09 RX ORDER — SODIUM CHLORIDE 9 MG/ML
INJECTION, SOLUTION INTRAVENOUS CONTINUOUS
Status: DISCONTINUED | OUTPATIENT
Start: 2020-10-09 | End: 2020-10-10

## 2020-10-09 RX ORDER — INSULIN GLARGINE 100 [IU]/ML
40 INJECTION, SOLUTION SUBCUTANEOUS NIGHTLY
Status: DISCONTINUED | OUTPATIENT
Start: 2020-10-09 | End: 2020-10-10

## 2020-10-09 RX ADMIN — HEPARIN SODIUM 5000 UNITS: 5000 INJECTION INTRAVENOUS; SUBCUTANEOUS at 04:48

## 2020-10-09 RX ADMIN — LEVOTHYROXINE SODIUM 175 MCG: 150 TABLET ORAL at 05:23

## 2020-10-09 RX ADMIN — SODIUM CHLORIDE, PRESERVATIVE FREE 10 ML: 5 INJECTION INTRAVENOUS at 08:46

## 2020-10-09 RX ADMIN — POTASSIUM CHLORIDE 10 MEQ: 7.46 INJECTION, SOLUTION INTRAVENOUS at 05:23

## 2020-10-09 RX ADMIN — ASPIRIN 81 MG: 81 TABLET, CHEWABLE ORAL at 08:45

## 2020-10-09 RX ADMIN — METOPROLOL TARTRATE 25 MG: 25 TABLET, FILM COATED ORAL at 20:42

## 2020-10-09 RX ADMIN — ACYCLOVIR SODIUM 900 MG: 50 INJECTION, SOLUTION INTRAVENOUS at 04:44

## 2020-10-09 RX ADMIN — METOPROLOL TARTRATE 25 MG: 25 TABLET, FILM COATED ORAL at 08:45

## 2020-10-09 RX ADMIN — POTASSIUM CHLORIDE 10 MEQ: 1500 TABLET, EXTENDED RELEASE ORAL at 20:42

## 2020-10-09 RX ADMIN — SODIUM CHLORIDE: 9 INJECTION, SOLUTION INTRAVENOUS at 22:33

## 2020-10-09 RX ADMIN — SODIUM CHLORIDE: 9 INJECTION, SOLUTION INTRAVENOUS at 10:52

## 2020-10-09 RX ADMIN — ACYCLOVIR SODIUM 900 MG: 50 INJECTION, SOLUTION INTRAVENOUS at 13:06

## 2020-10-09 RX ADMIN — POTASSIUM CHLORIDE, DEXTROSE MONOHYDRATE AND SODIUM CHLORIDE: 150; 5; 450 INJECTION, SOLUTION INTRAVENOUS at 03:16

## 2020-10-09 RX ADMIN — AMIODARONE HYDROCHLORIDE 200 MG: 200 TABLET ORAL at 08:45

## 2020-10-09 RX ADMIN — ACYCLOVIR SODIUM 900 MG: 50 INJECTION, SOLUTION INTRAVENOUS at 20:34

## 2020-10-09 RX ADMIN — HEPARIN SODIUM 5000 UNITS: 5000 INJECTION INTRAVENOUS; SUBCUTANEOUS at 14:43

## 2020-10-09 RX ADMIN — AMPICILLIN SODIUM AND SULBACTAM SODIUM 3 G: 2; 1 INJECTION, POWDER, FOR SOLUTION INTRAMUSCULAR; INTRAVENOUS at 20:32

## 2020-10-09 RX ADMIN — POTASSIUM CHLORIDE 10 MEQ: 7.46 INJECTION, SOLUTION INTRAVENOUS at 07:07

## 2020-10-09 RX ADMIN — AMPICILLIN SODIUM AND SULBACTAM SODIUM 3 G: 2; 1 INJECTION, POWDER, FOR SOLUTION INTRAMUSCULAR; INTRAVENOUS at 14:47

## 2020-10-09 RX ADMIN — AMPICILLIN SODIUM AND SULBACTAM SODIUM 3 G: 2; 1 INJECTION, POWDER, FOR SOLUTION INTRAMUSCULAR; INTRAVENOUS at 02:34

## 2020-10-09 RX ADMIN — HEPARIN SODIUM 5000 UNITS: 5000 INJECTION INTRAVENOUS; SUBCUTANEOUS at 21:41

## 2020-10-09 RX ADMIN — POTASSIUM CHLORIDE 10 MEQ: 1500 TABLET, EXTENDED RELEASE ORAL at 08:45

## 2020-10-09 RX ADMIN — AMPICILLIN SODIUM AND SULBACTAM SODIUM 3 G: 2; 1 INJECTION, POWDER, FOR SOLUTION INTRAMUSCULAR; INTRAVENOUS at 08:44

## 2020-10-09 RX ADMIN — PANTOPRAZOLE SODIUM 40 MG: 40 INJECTION, POWDER, FOR SOLUTION INTRAVENOUS at 08:45

## 2020-10-09 RX ADMIN — INSULIN GLARGINE 40 UNITS: 100 INJECTION, SOLUTION SUBCUTANEOUS at 21:41

## 2020-10-09 RX ADMIN — LOSARTAN POTASSIUM 25 MG: 25 TABLET, FILM COATED ORAL at 08:45

## 2020-10-09 RX ADMIN — POTASSIUM CHLORIDE 10 MEQ: 7.46 INJECTION, SOLUTION INTRAVENOUS at 04:48

## 2020-10-09 ASSESSMENT — PAIN SCALES - WONG BAKER

## 2020-10-09 ASSESSMENT — PAIN SCALES - GENERAL: PAINLEVEL_OUTOF10: 8
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
## 2020-10-09 NOTE — PROGRESS NOTES
Progress Note( Dr. Mague Ferrera)  10/8/2020  Subjective:   Admit Date: 10/6/2020  PCP: Heather Nesbitt DO    Admitted For : Altered mental state patient was in DKA    Consulted For: Rate control of blood glucose    Interval History: Though he seemed to be somewhat better still confused unable to get any information from him    Not much information available from patient      Intake/Output Summary (Last 24 hours) at 10/8/2020 2115  Last data filed at 10/8/2020 2104  Gross per 24 hour   Intake 2840.59 ml   Output 4400 ml   Net -1559.41 ml       DATA    CBC:   Recent Labs     10/06/20  1524 10/07/20  0552 10/08/20  0830   WBC 27.0* 24.5* 15.6*   HGB 18.3* 16.6 13.5   * 421 277    CMP:  Recent Labs     10/06/20  1524 10/06/20  1900  10/08/20  0536 10/08/20  0930 10/08/20  1305   * 137   < > 138 135 135   K 6.2* 5.6*   < > 3.2* 3.1* 3.1*   CL 86* 94*   < > 105 104 102   CO2 5* 5*   < > 23 24 24   BUN 44* 42*   < > 14 11 10   CREATININE 1.6* 1.4*   < > 0.7* 0.7* 0.6*   CALCIUM 10.6 10.2   < > 8.5 8.4 8.4   PROT 7.9 7.4  --   --   --   --    LABALBU 3.5 3.8  --   --   --   --    BILITOT 0.4 0.4  --   --   --   --    ALKPHOS 129* 135*  --   --   --   --    AST 11* 13*  --   --   --   --    ALT 15 15  --   --   --   --     < > = values in this interval not displayed. Lipids: No results found for: CHOL, HDL, TRIG  Glucose:  Recent Labs     10/08/20  1422 10/08/20  1616 10/08/20  2000   POCGLU 244* 221* 185*     [id number]:No results found for: LABA1C  High Sensitivity TSH:   Lab Results   Component Value Date    TSHHS 3.490 10/06/2020     Free T3: No results found for: FT3  Free T4:No results found for: T4FREE    Ct Head Wo Contrast    Result Date: 10/6/2020  EXAMINATION: CT OF THE HEAD WITHOUT CONTRAST  10/6/2020 3:44 pm     No acute intracranial abnormality.      Ct Abdomen Pelvis W Iv Contrast Additional Contrast? None    Result Date: 10/6/2020  EXAMINATION: CT OF THE ABDOMEN AND PELVIS WITH CONTRAST 10/6/2020 4:07 pm    No acute abnormality noted in the abdomen or pelvis. Cta Pulmonary W Contrast    Result Date: 10/6/2020  EXAMINATION: CTA OF THE CHEST 10/6/2020 5:01 pm     Extensive cardiac and respiratory motion through the hilar regions challenge evaluation. No definite central emboli identified. Up to 7 mm nodule right upper lobe. Fleischner Society guidelines for follow-up and management of incidentally detected pulmonary nodules: Single Solid Nodule: Nodule size less than 6 mm In a low-risk patient, no routine follow-up. In a high-risk patient, optional CT at 12 months. Nodule size equals 6-8 mm In a low-risk patient, CT at 6-12 months, then consider CT at 18-24 months. In a high-risk patient, CT at 6-12 months, then CT at 18-24 months. Nodule size greater than 8 mm In a low-risk patient, consider CT at 3 months, PET/CT, or tissue sampling. In a high-risk patient, consider CT at 3 months, PET/CT, or tissue sampling. Multiple Solid Nodules: Nodule size less than 6 mm In a low-risk patient, no routine follow-up. In a high-risk patient, optional CT at 12 months. Nodule size equals 6-8 mm In a low-risk patient, CT at 3-6 months, then consider CT at 18-24 months. In a high-risk patient, CT at 3-6 months, then CT at 18-24 months. Nodule size greater than 8 mm In a low-risk patient, CT at 3-6 months, then consider CT at 18-24 months. In a high-risk patient, CT at 3-6 months, then CT at 18-24 months. - Low risk patients include individuals with minimal or absent history of smoking and other known risk factors. - High risk patients include individuals with a history or smoking or known risk factors. Radiology 2017 http://pubs. rsna.org/doi/full/10.1148/radiol. 6517493477     Xr Abdomen For Ng/og/ne Tube Placement    Result Date: 10/7/2020  EXAMINATION: ONE SUPINE XRAY VIEW(S) OF THE ABDOMEN 10/7/2020 12:31 pm COMPARISON: None.  HISTORY: ORDERING SYSTEM PROVIDED HISTORY: Confirmation of course of NG/OG/NE tube and location of tip of tube TECHNOLOGIST PROVIDED HISTORY: Reason for exam:->Confirmation of course of NG/OG/NE tube and location of tip of tube Portable? ->Yes Reason for Exam: Confirmation of course of NG/OG/NE tube and location of tip of tube FINDINGS: Enteric catheter is present. The side port is at the level of the GE junction. Tip projects over the gastric fundus. Right mid lung opacification. Enteric catheter with side port at the level of GE junction. Recommend advancing 3-4 cm to ensure side port is well below the GE junction.          Scheduled Medicines   Medications:    [START ON 10/9/2020] influenza virus vaccine  0.5 mL Intramuscular Once    [START ON 10/9/2020] losartan  25 mg Oral Daily    amiodarone  200 mg Oral Daily    ampicillin-sulbactam  3 g Intravenous Q6H    metoprolol tartrate  25 mg Oral BID    aspirin  81 mg Oral Daily    sodium chloride flush  10 mL Intravenous 2 times per day    sodium chloride flush  10 mL Intravenous 2 times per day    acyclovir  10 mg/kg (Adjusted) Intravenous Q8H    heparin (porcine)  5,000 Units Subcutaneous 3 times per day    levothyroxine  175 mcg Oral Daily    pantoprazole  40 mg Intravenous Daily      Infusions:    dextrose 5% and 0.45% NaCl with KCl 20 mEq 100 mL/hr at 10/08/20 1025    dextrose      dextrose 5 % and 0.45 % NaCl 150 mL/hr at 10/08/20 0159    insulin 3 Units/hr (10/08/20 2000)         Objective:   Vitals: /67   Pulse 94   Temp 100 °F (37.8 °C) (Rectal)   Resp 17   Ht 5' 11\" (1.803 m)   Wt 248 lb 10.9 oz (112.8 kg)   SpO2 (!) 89%   BMI 34.68 kg/m²   General appearance: alert and little more cooperative with exam confused  Neck: no JVD or bruit  Thyroid : Normal lobes   Lungs: Has Vesicular Breath sounds   Heart:  regular rate and rhythm  Abdomen: soft, non-tender; bowel sounds normal; no masses,  no organomegaly  Musculoskeletal: Normal  Extremities: extremities normal, , no edema  Neurologic:  Awake, drowsy unable to get any information oriented to name, not to place and time. Cranial nerves II-XII are grossly intact. Motor weakness. Sensory is intact. ,  and gait is normal.    Assessment:     Patient Active Problem List:     DKA, type 2, not at goal [facility])     Altered mental state      Supraventricular tachycardia      Plan:     1. Reviewed POC blood glucose . Labs and X ray results   2. Reviewed Current Medicines   3. On IV insulin infusion drip protocol   4. Monitor Blood glucose frequently   5. Modified  the dose of Insulin/ other medicines as needed   6. Patient had normal cardiac arrhythmias. Cardiology to be consulted  7. We will keep on IV insulin infusion drip for now as patient is not able to eat enough  8. Will follow     .      Kirsten Borja MD

## 2020-10-09 NOTE — PROGRESS NOTES
Infectious Disease Progress Note  10/9/2020   Patient Name: Marianne Nguyen : 1950       Reason for visit: F/u metabolic encephalopathy, possible viral meningitis, sepsis, uncontrolled diabetes with DKA. ? History:? Interval history noted  Denies n/v/d/f or untoward effects of antimicrobials  Physical Exam:  Vital Signs: BP (!) 142/75   Pulse 99   Temp 99.9 °F (37.7 °C) (Rectal)   Resp 24   Ht 5' 11\" (1.803 m)   Wt 248 lb 10.9 oz (112.8 kg)   SpO2 98%   BMI 34.68 kg/m²     Gen: awake and alert  Skin: no stigmata of endocarditis  Wounds: C/D/I  HEMT: AT/NC Oropharynx pink, moist, and without lesions or exudates; dentition in good state of repair  Eyes: PERRLA, EOMI, conjunctiva pink, sclera anicteric. Neck: Supple. Trachea midline. No LAD. Chest: no distress and CTA. Good air movement. Heart: RRR and no MRG. Abd: soft, non-distended, no tenderness, no hepatomegaly. Normoactive bowel sounds. Ext: left AKA, nil pedal edema  Catheter Site: without erythema or tenderness  Neuro: CN 2-12 intact and no focal sensory or motor deficits     Radiologic / Imaging / TESTING  No results found.      Labs:    Recent Results (from the past 24 hour(s))   POCT Glucose    Collection Time: 10/08/20 12:14 PM   Result Value Ref Range    POC Glucose 185 (H) 70 - 99 MG/DL   Basic metabolic panel    Collection Time: 10/08/20  1:05 PM   Result Value Ref Range    Sodium 135 135 - 145 MMOL/L    Potassium 3.1 (L) 3.5 - 5.1 MMOL/L    Chloride 102 99 - 110 mMol/L    CO2 24 21 - 32 MMOL/L    Anion Gap 9 4 - 16    BUN 10 6 - 23 MG/DL    CREATININE 0.6 (L) 0.9 - 1.3 MG/DL    Glucose 220 (H) 70 - 99 MG/DL    Calcium 8.4 8.3 - 10.6 MG/DL    GFR Non-African American >60 >60 mL/min/1.73m2    GFR African American >60 >60 mL/min/1.73m2   POCT Glucose    Collection Time: 10/08/20  2:22 PM   Result Value Ref Range    POC Glucose 244 (H) 70 - 99 MG/DL   POCT Glucose    Collection Time: 10/08/20  4:16 PM   Result Value Ref Range POC Glucose 221 (H) 70 - 99 MG/DL   POCT Glucose    Collection Time: 10/08/20  8:00 PM   Result Value Ref Range    POC Glucose 185 (H) 70 - 99 MG/DL   POCT Glucose    Collection Time: 10/08/20 10:58 PM   Result Value Ref Range    POC Glucose 226 (H) 70 - 99 MG/DL   POCT Glucose    Collection Time: 10/09/20 12:33 AM   Result Value Ref Range    POC Glucose 178 (H) 70 - 99 MG/DL   POCT Glucose    Collection Time: 10/09/20  2:12 AM   Result Value Ref Range    POC Glucose 184 (H) 70 - 99 MG/DL   Procalcitonin    Collection Time: 10/09/20  2:21 AM   Result Value Ref Range    Procalcitonin 5.441    Basic metabolic panel    Collection Time: 10/09/20  2:21 AM   Result Value Ref Range    Sodium 138 135 - 145 MMOL/L    Potassium 3.2 (L) 3.5 - 5.1 MMOL/L    Chloride 104 99 - 110 mMol/L    CO2 24 21 - 32 MMOL/L    Anion Gap 10 4 - 16    BUN 6 6 - 23 MG/DL    CREATININE 0.6 (L) 0.9 - 1.3 MG/DL    Glucose 215 (H) 70 - 99 MG/DL    Calcium 8.6 8.3 - 10.6 MG/DL    GFR Non-African American >60 >60 mL/min/1.73m2    GFR African American >60 >60 mL/min/1.73m2   POCT Glucose    Collection Time: 10/09/20  4:53 AM   Result Value Ref Range    POC Glucose 215 (H) 70 - 99 MG/DL   CBC auto differential    Collection Time: 10/09/20  6:14 AM   Result Value Ref Range    WBC 15.0 (H) 4.0 - 10.5 K/CU MM    RBC 4.94 4.6 - 6.2 M/CU MM    Hemoglobin 14.1 13.5 - 18.0 GM/DL    Hematocrit 42.1 42 - 52 %    MCV 85.2 78 - 100 FL    MCH 28.5 27 - 31 PG    MCHC 33.5 32.0 - 36.0 %    RDW 13.4 11.7 - 14.9 %    Platelets 260 643 - 177 K/CU MM    MPV 9.2 7.5 - 11.1 FL    Differential Type AUTOMATED DIFFERENTIAL     Segs Relative 85.8 (H) 36 - 66 %    Lymphocytes % 6.6 (L) 24 - 44 %    Monocytes % 6.5 (H) 0 - 4 %    Eosinophils % 0.3 0 - 3 %    Basophils % 0.2 0 - 1 %    Segs Absolute 12.9 K/CU MM    Lymphocytes Absolute 1.0 K/CU MM    Monocytes Absolute 1.0 K/CU MM    Eosinophils Absolute 0.1 K/CU MM    Basophils Absolute 0.0 K/CU MM    Nucleated RBC % 0.0 % Total Nucleated RBC 0.0 K/CU MM    Total Immature Neutrophil 0.09 K/CU MM    Immature Neutrophil % 0.6 (H) 0 - 0.43 %   Basic metabolic panel    Collection Time: 10/09/20  6:14 AM   Result Value Ref Range    Sodium 139 135 - 145 MMOL/L    Potassium 3.4 (L) 3.5 - 5.1 MMOL/L    Chloride 106 99 - 110 mMol/L    CO2 26 21 - 32 MMOL/L    Anion Gap 7 4 - 16    BUN 6 6 - 23 MG/DL    CREATININE 0.7 (L) 0.9 - 1.3 MG/DL    Glucose 253 (H) 70 - 99 MG/DL    Calcium 8.6 8.3 - 10.6 MG/DL    GFR Non-African American >60 >60 mL/min/1.73m2    GFR African American >60 >60 mL/min/1.73m2   C-Reactive Protein    Collection Time: 10/09/20  6:14 AM   Result Value Ref Range    CRP, High Sensitivity 138.4 mg/L   POCT Glucose    Collection Time: 10/09/20  6:18 AM   Result Value Ref Range    POC Glucose 233 (H) 70 - 99 MG/DL   POCT Glucose    Collection Time: 10/09/20  8:06 AM   Result Value Ref Range    POC Glucose 205 (H) 70 - 99 MG/DL   POCT Glucose    Collection Time: 10/09/20 10:15 AM   Result Value Ref Range    POC Glucose 282 (H) 70 - 99 MG/DL     CULTURE results: Invalid input(s): BLOOD CULTURE,  URINE CULTURE, SURGICAL CULTURE    Diagnosis:  Patient Active Problem List   Diagnosis    DKA, type 2, not at goal [facility])    Sepsis with encephalopathy without septic shock ([facility] [address]       Active Problems  Active Problems:    DKA, type 2, not at goal [facility])    Sepsis with encephalopathy without septic shock ([facility] [address]  Resolved Problems:    * No resolved hospital problems. *      Impression and plan   Clinical status: Improving/clinically same/worsening. CRP is elevated, but by no means trend is yet.    Therapeutic: Continue Unasyn   Diagnostic: Trend CRP and procalcitonin   F/u: HSV PCR, West Nile antibodies IgG and IgM   Other:   Summary:      Electronically signed by: Electronically signed by Teresa Powell MD on 10/9/2020 at 10:55 AM

## 2020-10-09 NOTE — PROGRESS NOTES
Patient more awake and answering questions better now. He was able to eat some jello for me and took a pill earlier.  We removed the ng tube and took his restraints off

## 2020-10-09 NOTE — PROGRESS NOTES
Progress Note( Dr. Michele Schwarz)  10/9/2020  Subjective:   Admit Date: 10/6/2020  PCP: Conrado Nesbitt DO    Admitted For : Altered mental state patient was in DKA    Consulted For: Rate control of blood glucose    Interval History: He seemed to be much more alert this morning apparently he ate supper last night and   Running to eat breakfast this morning    Denies any chest pain  Highly short of breath  Has no diarrhea and any GI symptoms at this time    Bladder function seem to be okay    Intake/Output Summary (Last 24 hours) at 10/9/2020 0713  Last data filed at 10/8/2020 2104  Gross per 24 hour   Intake --   Output 3400 ml   Net -3400 ml       DATA    CBC:   Recent Labs     10/07/20  0552 10/08/20  0830 10/09/20  0614   WBC 24.5* 15.6* 15.0*   HGB 16.6 13.5 14.1    277 264    CMP:  Recent Labs     10/06/20  1524 10/06/20  1900  10/08/20  0930 10/08/20  1305 10/09/20  0221   * 137   < > 135 135 138   K 6.2* 5.6*   < > 3.1* 3.1* 3.2*   CL 86* 94*   < > 104 102 104   CO2 5* 5*   < > 24 24 24   BUN 44* 42*   < > 11 10 6   CREATININE 1.6* 1.4*   < > 0.7* 0.6* 0.6*   CALCIUM 10.6 10.2   < > 8.4 8.4 8.6   PROT 7.9 7.4  --   --   --   --    LABALBU 3.5 3.8  --   --   --   --    BILITOT 0.4 0.4  --   --   --   --    ALKPHOS 129* 135*  --   --   --   --    AST 11* 13*  --   --   --   --    ALT 15 15  --   --   --   --     < > = values in this interval not displayed. Lipids: No results found for: CHOL, HDL, TRIG  Glucose:  Recent Labs     10/09/20  0212 10/09/20  0453 10/09/20  0618   POCGLU 184* 215* 233*     [id number]:No results found for: LABA1C  High Sensitivity TSH:   Lab Results   Component Value Date    TSHHS 3.490 10/06/2020     Free T3: No results found for: FT3  Free T4:No results found for: T4FREE    Ct Head Wo Contrast    Result Date: 10/6/2020  EXAMINATION: CT OF THE HEAD WITHOUT CONTRAST  10/6/2020 3:44 pm     No acute intracranial abnormality.      Ct Abdomen Pelvis W Iv Contrast Additional Contrast? None    Result Date: 10/6/2020  EXAMINATION: CT OF THE ABDOMEN AND PELVIS WITH CONTRAST 10/6/2020 4:07 pm    No acute abnormality noted in the abdomen or pelvis. Cta Pulmonary W Contrast    Result Date: 10/6/2020  EXAMINATION: CTA OF THE CHEST 10/6/2020 5:01 pm     Extensive cardiac and respiratory motion through the hilar regions challenge evaluation. No definite central emboli identified. Up to 7 mm nodule right upper lobe. Fleischner Society guidelines for follow-up and management of incidentally detected pulmonary nodules: Single Solid Nodule: Nodule size less than 6 mm In a low-risk patient, no routine follow-up. In a high-risk patient, optional CT at 12 months. Nodule size equals 6-8 mm In a low-risk patient, CT at 6-12 months, then consider CT at 18-24 months. In a high-risk patient, CT at 6-12 months, then CT at 18-24 months. Nodule size greater than 8 mm In a low-risk patient, consider CT at 3 months, PET/CT, or tissue sampling. In a high-risk patient, consider CT at 3 months, PET/CT, or tissue sampling. Multiple Solid Nodules: Nodule size less than 6 mm In a low-risk patient, no routine follow-up. In a high-risk patient, optional CT at 12 months. Nodule size equals 6-8 mm In a low-risk patient, CT at 3-6 months, then consider CT at 18-24 months. In a high-risk patient, CT at 3-6 months, then CT at 18-24 months. Nodule size greater than 8 mm In a low-risk patient, CT at 3-6 months, then consider CT at 18-24 months. In a high-risk patient, CT at 3-6 months, then CT at 18-24 months. - Low risk patients include individuals with minimal or absent history of smoking and other known risk factors. - High risk patients include individuals with a history or smoking or known risk factors. Radiology 2017 http://pubs. rsna.org/doi/full/10.1148/radiol. 0255097069     Xr Abdomen For Ng/og/ne Tube Placement    Result Date: 10/7/2020  EXAMINATION: ONE SUPINE XRAY VIEW(S) OF THE ABDOMEN 10/7/2020

## 2020-10-09 NOTE — FLOWSHEET NOTE
[address] [facility]  DEPARTMENT OF SPEECH/LANGUAGE PATHOLOGY  DAILY PROGRESS NOTE  Leigh Ann Torres  10/9/2020  [id number]  Dehydration [E86.0]  Acute hyperkalemia [E87.5]  NSTEMI (non-ST elevated myocardial infarction) ([facility] [address] [I21.4]  Pulmonary nodule, right [R91.1]  YEE (acute kidney injury) ([facility] [address] [N17.9]  DKA, type 2, not at goal [facility]) [E11.10]  High anion gap metabolic acidosis [M06.6]  Diabetic ketoacidosis without coma associated with type 2 diabetes mellitus ([facility] [address] [E11.10]  Leukocytosis, unspecified type [S54.694]  Acute metabolic encephalopathy [P74.54]  Metabolic acidosis due to diabetes mellitus ([facility] [address] [E11.69, E87.2]  Severe hyperglycemia due to diabetes mellitus ([facility] [E11.65]  No Known Allergies      Pt was seen this date for dysphagia treatment. IMPRESSION AND RECOMMENDATIONS:   Leigh Ann Torres was seen for a bedside swallowing treatment and diet tolerance monitoring. Pt was alert and cooperative throughout assessment. He was positioned upright in bed and accepted PO trials of puree, soft/regular solids and thin liquids by spoon/cup/straw sips. Pt demonstrated prolonged mastication with timely oral A-P transit and adequate oral clearance. Pharyngeal swallow appeared intermittently delayed with adequate laryngeal elevation. Clear vocal quality and 0 overt s/s of aspiration were observed with all PO trials given. Recommend upgrade diet level to dysphagia 3 solids/thin liquids with strict aspiration precautions. ST will continue to follow Leigh Ann Torres for diet tolerance monitoring. GOALS (current status in bold):  Short-term Goals  Timeframe for Short-term Goals: length of admission  Goal 1: Pt will tolerate pureed diet/nectar thick liquids with adequate oral manipulation and no s/s aspiration.  Goal met- diet level upgraded, new goal  New goal: Pt will tolerate dysphagia 3 diet/thin liquids without clinical evidence of aspiration 100%  Goal 2: Pt will tolerate PO trials of advanced textures with adequate oral manipulation and no s/s aspiration. Goal being met, continue   Goal 3: Pt/caregivers will indicate understanding of all recommendations.  Goal being met, continue             EDUCATION: recommendations/POC    PAIN RATING (0-10 Scale): denies   Time in/Time out: SLP Individual Minutes  Time In: 1500  Time Out: [facility]  Minutes: 30    Visit number: [address], [facility]-SLP, 10/9/2020

## 2020-10-09 NOTE — PROGRESS NOTES
Hospitalist Progress Note      Name:  Johny Nance /Age/Sex: 1950  (78 y.o. male)   MRN & CSN:  [id number] & [id number] Admission Date/Time: 10/6/2020  3:23 PM   Location:  -A PCP: Asad Issa 60 Day: 4    Assessment and Plan:   Johny Nance is a 78 y.o.  male  who presents with altered mental status.    1) DKA due to sepsis and  Noncompliance  -Received IV NS for rehydration  -Started on insulin drip based on DKA Protocol  -Endo on board    2) Sepsis 2/2 Possible viral meningitis  -CT Chest and A/P: Non acute  -Also concerning for dental Caries  -CRP and Procal elevated; will trend  -LP showed mildly elevated WBC; with monocytosis  -Follow Bc and CSF HSV, [place]  -ID on board; on IV Unasyn and Acyclovir    3) Acute Metabolic Encephalopathy  -Improved  -CT Head: Non acute  -Continue to monitor     4) Electrolyte imbalance due refeeding syndrome  -Hypokalemia, Hypophosphatemia, Hypomagnesemia  -Monitor and replace as needed      5) Elevated troponin  -Had episodes of NSVT  -Cardiology on board; continue amiod and metoprolol    6) Incidental Pulmonary nodule RUL, 7 mm on CT chest  -Will need outpatient f/u in 6 - 12 months        Chronic medical conditions  - HTN, Continue home meds  - Hypothyroidism- Cont synthroid  -History of CHF, lasix on hold  -Left AKA      Diet DIET GENERAL; Dysphagia Pureed; Mildly Thick (Nectar)   DVT Prophylaxis [] Lovenox, []  Heparin, [] SCDs, [] Ambulation   GI Prophylaxis [] PPI,  [] H2 Blocker,  [] Carafate,  [] Diet/Tube Feeds   Code Status Full Code   Disposition TBD   MDM      History of Present Illness:     Patient was seen and examined  Denied any chest pain, dizziness  No palpitations, SOB  No worsening confusion reported  Still having low grade fever       Ten point ROS reviewed negative, unless as noted above    Objective:        Intake/Output Summary (Last 24 hours) at 10/9/2020 1035  Last data filed at 10/9/2020 pantoprazole  40 mg Intravenous Daily      Infusions:    sodium chloride      dextrose 5% and 0.45% NaCl with KCl 20 mEq 100 mL/hr at 10/09/20 0316    dextrose      dextrose 5 % and 0.45 % NaCl 150 mL/hr at 10/08/20 0159    insulin 6 Units/hr (10/09/20 1016)     PRN Meds: glucose, 15 g, PRN  glucagon (rDNA), 1 mg, PRN  dextrose, 100 mL/hr, PRN  dextrose, 12.5 g, PRN  potassium chloride, 10 mEq, PRN  magnesium sulfate, 1 g, PRN  sodium phosphate IVPB, 10 mmol, PRN    Or  sodium phosphate IVPB, 15 mmol, PRN    Or  sodium phosphate IVPB, 20 mmol, PRN  dextrose 5 % and 0.45 % NaCl, , Continuous PRN          Electronically signed by Silver Medina MD on 10/9/2020 at 10:35 AM

## 2020-10-10 LAB
ANION GAP SERPL CALCULATED.3IONS-SCNC: 13 MMOL/L (ref 4–16)
BASOPHILS ABSOLUTE: 0.1 K/CU MM
BASOPHILS RELATIVE PERCENT: 0.4 % (ref 0–1)
BUN BLDV-MCNC: 6 MG/DL (ref 6–23)
CALCIUM SERPL-MCNC: 8.5 MG/DL (ref 8.3–10.6)
CHLORIDE BLD-SCNC: 103 MMOL/L (ref 99–110)
CO2: 24 MMOL/L (ref 21–32)
CREAT SERPL-MCNC: 0.7 MG/DL (ref 0.9–1.3)
DIFFERENTIAL TYPE: ABNORMAL
EOSINOPHILS ABSOLUTE: 0.2 K/CU MM
EOSINOPHILS RELATIVE PERCENT: 1.8 % (ref 0–3)
GFR AFRICAN AMERICAN: >60 ML/MIN/1.73M2
GFR NON-AFRICAN AMERICAN: >60 ML/MIN/1.73M2
GLUCOSE BLD-MCNC: 185 MG/DL (ref 70–99)
GLUCOSE BLD-MCNC: 192 MG/DL (ref 70–99)
GLUCOSE BLD-MCNC: 207 MG/DL (ref 70–99)
GLUCOSE BLD-MCNC: 239 MG/DL (ref 70–99)
GLUCOSE BLD-MCNC: 260 MG/DL (ref 70–99)
GLUCOSE BLD-MCNC: 260 MG/DL (ref 70–99)
HCT VFR BLD CALC: 41.3 % (ref 42–52)
HEMOGLOBIN: 13.8 GM/DL (ref 13.5–18)
HIGH SENSITIVE C-REACTIVE PROTEIN: 135.7 MG/L
IMMATURE NEUTROPHIL %: 0.5 % (ref 0–0.43)
LYMPHOCYTES ABSOLUTE: 1 K/CU MM
LYMPHOCYTES RELATIVE PERCENT: 7.4 % (ref 24–44)
MAGNESIUM: 1.8 MG/DL (ref 1.8–2.4)
MCH RBC QN AUTO: 28.6 PG (ref 27–31)
MCHC RBC AUTO-ENTMCNC: 33.4 % (ref 32–36)
MCV RBC AUTO: 85.5 FL (ref 78–100)
MONOCYTES ABSOLUTE: 0.8 K/CU MM
MONOCYTES RELATIVE PERCENT: 6.2 % (ref 0–4)
NUCLEATED RBC %: 0 %
PDW BLD-RTO: 13.5 % (ref 11.7–14.9)
PHOSPHORUS: 1.6 MG/DL (ref 2.5–4.9)
PLATELET # BLD: 290 K/CU MM (ref 140–440)
PMV BLD AUTO: 10.1 FL (ref 7.5–11.1)
POTASSIUM SERPL-SCNC: 3.2 MMOL/L (ref 3.5–5.1)
RBC # BLD: 4.83 M/CU MM (ref 4.6–6.2)
SEGMENTED NEUTROPHILS ABSOLUTE COUNT: 11.1 K/CU MM
SEGMENTED NEUTROPHILS RELATIVE PERCENT: 83.7 % (ref 36–66)
SODIUM BLD-SCNC: 140 MMOL/L (ref 135–145)
TOTAL IMMATURE NEUTOROPHIL: 0.07 K/CU MM
TOTAL NUCLEATED RBC: 0 K/CU MM
WBC # BLD: 13.3 K/CU MM (ref 4–10.5)

## 2020-10-10 PROCEDURE — 6360000002 HC RX W HCPCS: Performed by: INTERNAL MEDICINE

## 2020-10-10 PROCEDURE — 94761 N-INVAS EAR/PLS OXIMETRY MLT: CPT

## 2020-10-10 PROCEDURE — 6370000000 HC RX 637 (ALT 250 FOR IP): Performed by: INTERNAL MEDICINE

## 2020-10-10 PROCEDURE — 83735 ASSAY OF MAGNESIUM: CPT

## 2020-10-10 PROCEDURE — 99232 SBSQ HOSP IP/OBS MODERATE 35: CPT | Performed by: INTERNAL MEDICINE

## 2020-10-10 PROCEDURE — 85025 COMPLETE CBC W/AUTO DIFF WBC: CPT

## 2020-10-10 PROCEDURE — 1200000000 HC SEMI PRIVATE

## 2020-10-10 PROCEDURE — C9113 INJ PANTOPRAZOLE SODIUM, VIA: HCPCS | Performed by: NURSE PRACTITIONER

## 2020-10-10 PROCEDURE — 84100 ASSAY OF PHOSPHORUS: CPT

## 2020-10-10 PROCEDURE — 2580000003 HC RX 258: Performed by: INTERNAL MEDICINE

## 2020-10-10 PROCEDURE — 82962 GLUCOSE BLOOD TEST: CPT

## 2020-10-10 PROCEDURE — 6370000000 HC RX 637 (ALT 250 FOR IP): Performed by: NURSE PRACTITIONER

## 2020-10-10 PROCEDURE — 86141 C-REACTIVE PROTEIN HS: CPT

## 2020-10-10 PROCEDURE — 6360000002 HC RX W HCPCS: Performed by: NURSE PRACTITIONER

## 2020-10-10 PROCEDURE — 2500000003 HC RX 250 WO HCPCS: Performed by: INTERNAL MEDICINE

## 2020-10-10 PROCEDURE — 80048 BASIC METABOLIC PNL TOTAL CA: CPT

## 2020-10-10 RX ORDER — METOPROLOL SUCCINATE 50 MG/1
50 TABLET, EXTENDED RELEASE ORAL DAILY
Status: DISCONTINUED | OUTPATIENT
Start: 2020-10-10 | End: 2020-10-12 | Stop reason: HOSPADM

## 2020-10-10 RX ORDER — LACTOBACILLUS RHAMNOSUS GG 10B CELL
1 CAPSULE ORAL
Status: DISCONTINUED | OUTPATIENT
Start: 2020-10-11 | End: 2020-10-12 | Stop reason: HOSPADM

## 2020-10-10 RX ORDER — INSULIN GLARGINE 100 [IU]/ML
50 INJECTION, SOLUTION SUBCUTANEOUS NIGHTLY
Status: DISCONTINUED | OUTPATIENT
Start: 2020-10-10 | End: 2020-10-12 | Stop reason: HOSPADM

## 2020-10-10 RX ADMIN — HEPARIN SODIUM 5000 UNITS: 5000 INJECTION INTRAVENOUS; SUBCUTANEOUS at 15:05

## 2020-10-10 RX ADMIN — LOSARTAN POTASSIUM 25 MG: 25 TABLET, FILM COATED ORAL at 08:50

## 2020-10-10 RX ADMIN — LEVOTHYROXINE SODIUM 175 MCG: 150 TABLET ORAL at 05:46

## 2020-10-10 RX ADMIN — AMIODARONE HYDROCHLORIDE 200 MG: 200 TABLET ORAL at 08:50

## 2020-10-10 RX ADMIN — POTASSIUM CHLORIDE 10 MEQ: 1500 TABLET, EXTENDED RELEASE ORAL at 19:55

## 2020-10-10 RX ADMIN — AMPICILLIN SODIUM AND SULBACTAM SODIUM 3 G: 2; 1 INJECTION, POWDER, FOR SOLUTION INTRAMUSCULAR; INTRAVENOUS at 02:10

## 2020-10-10 RX ADMIN — AMPICILLIN SODIUM AND SULBACTAM SODIUM 3 G: 2; 1 INJECTION, POWDER, FOR SOLUTION INTRAMUSCULAR; INTRAVENOUS at 15:04

## 2020-10-10 RX ADMIN — POTASSIUM PHOSPHATE, MONOBASIC AND POTASSIUM PHOSPHATE, DIBASIC 20 MMOL: 224; 236 INJECTION, SOLUTION, CONCENTRATE INTRAVENOUS at 10:04

## 2020-10-10 RX ADMIN — METOPROLOL TARTRATE 25 MG: 25 TABLET, FILM COATED ORAL at 08:51

## 2020-10-10 RX ADMIN — HEPARIN SODIUM 5000 UNITS: 5000 INJECTION INTRAVENOUS; SUBCUTANEOUS at 05:49

## 2020-10-10 RX ADMIN — ASPIRIN 81 MG: 81 TABLET, CHEWABLE ORAL at 08:50

## 2020-10-10 RX ADMIN — METOPROLOL SUCCINATE 50 MG: 50 TABLET, EXTENDED RELEASE ORAL at 18:03

## 2020-10-10 RX ADMIN — INSULIN GLARGINE 50 UNITS: 100 INJECTION, SOLUTION SUBCUTANEOUS at 20:00

## 2020-10-10 RX ADMIN — PANTOPRAZOLE SODIUM 40 MG: 40 INJECTION, POWDER, FOR SOLUTION INTRAVENOUS at 08:41

## 2020-10-10 RX ADMIN — SODIUM CHLORIDE: 9 INJECTION, SOLUTION INTRAVENOUS at 09:20

## 2020-10-10 RX ADMIN — ACYCLOVIR SODIUM 900 MG: 50 INJECTION, SOLUTION INTRAVENOUS at 04:00

## 2020-10-10 RX ADMIN — AMPICILLIN SODIUM AND SULBACTAM SODIUM 3 G: 2; 1 INJECTION, POWDER, FOR SOLUTION INTRAMUSCULAR; INTRAVENOUS at 08:43

## 2020-10-10 RX ADMIN — POTASSIUM CHLORIDE 10 MEQ: 1500 TABLET, EXTENDED RELEASE ORAL at 08:51

## 2020-10-10 RX ADMIN — AMPICILLIN SODIUM AND SULBACTAM SODIUM 3 G: 2; 1 INJECTION, POWDER, FOR SOLUTION INTRAMUSCULAR; INTRAVENOUS at 19:55

## 2020-10-10 ASSESSMENT — PAIN SCALES - GENERAL: PAINLEVEL_OUTOF10: 0
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
## 2020-10-10 NOTE — PROGRESS NOTES
1891  Gross per 24 hour   Intake 2041 ml   Output 2925 ml   Net -884 ml      Vitals:   Vitals:    10/10/20 0845   BP: 117/67   Pulse: 94   Resp: 16   Temp: 97.7 °F (36.5 °C)   SpO2: 93%     Physical Exam:   GEN Awake male, sitting upright in bed in no apparent distress. Appears given age. EYES Pupils are equally round. No scleral erythema, discharge, or conjunctivitis. HENT Mucous membranes are moist. Oral pharynx without exudates, no evidence of thrush. NECK Supple, no apparent thyromegaly or masses. RESP Clear to auscultation, no wheezes, rales or rhonchi. Symmetric chest movement while on room air. CARDIO/VASC S1/S2 auscultated. Regular rate without appreciable murmurs, rubs, or gallops. No JVD or carotid bruits. Peripheral pulses equal bilaterally and palpable. No peripheral edema. GI Abdomen is soft without significant tenderness, masses, or guarding. Bowel sounds are normoactive. Rectal exam deferred.  No costovertebral angle tenderness. Normal appearing external genitalia. Lopez catheter is not present. HEME/LYMPH No palpable cervical lymphadenopathy and no hepatosplenomegaly. No petechiae or ecchymoses. MSK No gross joint deformities. SKIN Normal coloration, warm, dry. NEURO Cranial nerves appear grossly intact, normal speech, no lateralizing weakness. PSYCH Awake, alert, oriented x 4. Affect appropriate.     Medications:   Medications:    potassium phosphate IVPB  20 mmol Intravenous Once    insulin glargine  50 Units Subcutaneous Nightly    insulin lispro  0-12 Units Subcutaneous 2 times per day    potassium chloride  10 mEq Oral BID    insulin lispro  15 Units Subcutaneous TID WC    insulin lispro  0-12 Units Subcutaneous TID WC    influenza virus vaccine  0.5 mL Intramuscular Once    losartan  25 mg Oral Daily    amiodarone  200 mg Oral Daily    ampicillin-sulbactam  3 g Intravenous Q6H    metoprolol tartrate  25 mg Oral BID    aspirin  81 mg Oral Daily    acyclovir  10 mg/kg (Adjusted) Intravenous Q8H    heparin (porcine)  5,000 Units Subcutaneous 3 times per day    levothyroxine  175 mcg Oral Daily    pantoprazole  40 mg Intravenous Daily      Infusions:    sodium chloride 100 mL/hr at 10/10/20 0920    dextrose      dextrose 5 % and 0.45 % NaCl 150 mL/hr at 10/08/20 0159     PRN Meds: glucose, 15 g, PRN  glucagon (rDNA), 1 mg, PRN  dextrose, 100 mL/hr, PRN  dextrose, 12.5 g, PRN  potassium chloride, 10 mEq, PRN  magnesium sulfate, 1 g, PRN  sodium phosphate IVPB, 10 mmol, PRN    Or  sodium phosphate IVPB, 15 mmol, PRN    Or  sodium phosphate IVPB, 20 mmol, PRN  dextrose 5 % and 0.45 % NaCl, , Continuous PRN          Electronically signed by Dana Rice MD on 10/10/2020 at 10:15 AM

## 2020-10-10 NOTE — PROGRESS NOTES
Infectious Disease Progress Note  10/10/2020   Patient Name: Laurell Closs : 1950       Reason for visit: F/u metabolic encephalopathy, possible viral meningitis, sepsis, uncontrolled diabetes with DKA. ? History:? Interval history noted. HSV PCR negative  Denies n/v/d/f or untoward effects of antimicrobials  Stool  Physical Exam:  Vital Signs: /67   Pulse 94   Temp 97.7 °F (36.5 °C) (Oral)   Resp 16   Ht 5' 11\" (1.803 m)   Wt 260 lb 1.6 oz (118 kg)   SpO2 93%   BMI 36.28 kg/m²     Gen: awake and alert  Skin: no stigmata of endocarditis  Wounds: C/D/I  HEMT: AT/NC Oropharynx pink, moist, and without lesions or exudates; dentition in good state of repair  Eyes: PERRLA, EOMI, conjunctiva pink, sclera anicteric. Neck: Supple. Trachea midline. No LAD. Chest: no distress and CTA. Good air movement. Heart: RRR and no MRG. Abd: soft, non-distended, no tenderness, no hepatomegaly. Normoactive bowel sounds. Ext: left AKA, nil pedal edema  Catheter Site: without erythema or tenderness  Neuro: CN 2-12 intact and no focal sensory or motor deficits     Radiologic / Imaging / TESTING  No results found.      Labs:    Recent Results (from the past 24 hour(s))   POCT Glucose    Collection Time: 10/09/20 12:14 PM   Result Value Ref Range    POC Glucose 150 (H) 70 - 99 MG/DL   POCT Glucose    Collection Time: 10/09/20  5:32 PM   Result Value Ref Range    POC Glucose 211 (H) 70 - 99 MG/DL   POCT Glucose    Collection Time: 10/09/20  9:24 PM   Result Value Ref Range    POC Glucose 248 (H) 70 - 99 MG/DL   POCT Glucose    Collection Time: 10/10/20  1:58 AM   Result Value Ref Range    POC Glucose 207 (H) 70 - 99 MG/DL   CBC auto differential    Collection Time: 10/10/20  5:59 AM   Result Value Ref Range    WBC 13.3 (H) 4.0 - 10.5 K/CU MM    RBC 4.83 4.6 - 6.2 M/CU MM    Hemoglobin 13.8 13.5 - 18.0 GM/DL    Hematocrit 41.3 (L) 42 - 52 %    MCV 85.5 78 - 100 FL    MCH 28.6 27 - 31 PG    MCHC 33.4 32.0 - *      Impression and plan   Clinical status: Improving. CRP is elevated, but by no means trend is yet. Complains of stool frequency. May be having bit of antibiotic associated diarrhea or osmotic diarrhea from hypoglycemia.  Therapeutic: Continue Unasyn, end date: 10/13/2020. Start probiotics.   Discontinue acyclovir    Diagnostic: Trend CRP and procalcitonin   F/u:    Other:   Summary:      Electronically signed by: Electronically signed by David Jones MD on 10/10/2020 at 11:44 AM

## 2020-10-11 LAB
ANION GAP SERPL CALCULATED.3IONS-SCNC: 8 MMOL/L (ref 4–16)
BASOPHILS ABSOLUTE: 0 K/CU MM
BASOPHILS RELATIVE PERCENT: 0.4 % (ref 0–1)
BUN BLDV-MCNC: 6 MG/DL (ref 6–23)
CALCIUM SERPL-MCNC: 8.7 MG/DL (ref 8.3–10.6)
CHLORIDE BLD-SCNC: 101 MMOL/L (ref 99–110)
CO2: 28 MMOL/L (ref 21–32)
CREAT SERPL-MCNC: 0.6 MG/DL (ref 0.9–1.3)
DIFFERENTIAL TYPE: ABNORMAL
EOSINOPHILS ABSOLUTE: 0.2 K/CU MM
EOSINOPHILS RELATIVE PERCENT: 2 % (ref 0–3)
GFR AFRICAN AMERICAN: >60 ML/MIN/1.73M2
GFR NON-AFRICAN AMERICAN: >60 ML/MIN/1.73M2
GLUCOSE BLD-MCNC: 118 MG/DL (ref 70–99)
GLUCOSE BLD-MCNC: 145 MG/DL (ref 70–99)
GLUCOSE BLD-MCNC: 184 MG/DL (ref 70–99)
GLUCOSE BLD-MCNC: 200 MG/DL (ref 70–99)
GLUCOSE BLD-MCNC: 207 MG/DL (ref 70–99)
GLUCOSE BLD-MCNC: 249 MG/DL (ref 70–99)
HCT VFR BLD CALC: 40.6 % (ref 42–52)
HEMOGLOBIN: 13.2 GM/DL (ref 13.5–18)
HIGH SENSITIVE C-REACTIVE PROTEIN: 107.7 MG/L
IMMATURE NEUTROPHIL %: 0.5 % (ref 0–0.43)
LYMPHOCYTES ABSOLUTE: 1.1 K/CU MM
LYMPHOCYTES RELATIVE PERCENT: 9.4 % (ref 24–44)
MAGNESIUM: 1.8 MG/DL (ref 1.8–2.4)
MCH RBC QN AUTO: 28.1 PG (ref 27–31)
MCHC RBC AUTO-ENTMCNC: 32.5 % (ref 32–36)
MCV RBC AUTO: 86.4 FL (ref 78–100)
MONOCYTES ABSOLUTE: 0.9 K/CU MM
MONOCYTES RELATIVE PERCENT: 7.8 % (ref 0–4)
NUCLEATED RBC %: 0 %
PDW BLD-RTO: 13.3 % (ref 11.7–14.9)
PHOSPHORUS: 2.8 MG/DL (ref 2.5–4.9)
PLATELET # BLD: 298 K/CU MM (ref 140–440)
PMV BLD AUTO: 9.8 FL (ref 7.5–11.1)
POTASSIUM SERPL-SCNC: 3.5 MMOL/L (ref 3.5–5.1)
PROCALCITONIN: 0.08
RBC # BLD: 4.7 M/CU MM (ref 4.6–6.2)
SEGMENTED NEUTROPHILS ABSOLUTE COUNT: 9.1 K/CU MM
SEGMENTED NEUTROPHILS RELATIVE PERCENT: 79.9 % (ref 36–66)
SODIUM BLD-SCNC: 137 MMOL/L (ref 135–145)
TOTAL IMMATURE NEUTOROPHIL: 0.06 K/CU MM
TOTAL NUCLEATED RBC: 0 K/CU MM
WBC # BLD: 11.3 K/CU MM (ref 4–10.5)

## 2020-10-11 PROCEDURE — 80048 BASIC METABOLIC PNL TOTAL CA: CPT

## 2020-10-11 PROCEDURE — 85025 COMPLETE CBC W/AUTO DIFF WBC: CPT

## 2020-10-11 PROCEDURE — 82962 GLUCOSE BLOOD TEST: CPT

## 2020-10-11 PROCEDURE — 6370000000 HC RX 637 (ALT 250 FOR IP): Performed by: INTERNAL MEDICINE

## 2020-10-11 PROCEDURE — 86141 C-REACTIVE PROTEIN HS: CPT

## 2020-10-11 PROCEDURE — 36592 COLLECT BLOOD FROM PICC: CPT

## 2020-10-11 PROCEDURE — 84100 ASSAY OF PHOSPHORUS: CPT

## 2020-10-11 PROCEDURE — 83735 ASSAY OF MAGNESIUM: CPT

## 2020-10-11 PROCEDURE — 94761 N-INVAS EAR/PLS OXIMETRY MLT: CPT

## 2020-10-11 PROCEDURE — 6360000002 HC RX W HCPCS: Performed by: NURSE PRACTITIONER

## 2020-10-11 PROCEDURE — 2580000003 HC RX 258: Performed by: INTERNAL MEDICINE

## 2020-10-11 PROCEDURE — 1200000000 HC SEMI PRIVATE

## 2020-10-11 PROCEDURE — 6360000002 HC RX W HCPCS: Performed by: INTERNAL MEDICINE

## 2020-10-11 PROCEDURE — 6370000000 HC RX 637 (ALT 250 FOR IP): Performed by: NURSE PRACTITIONER

## 2020-10-11 PROCEDURE — 84145 PROCALCITONIN (PCT): CPT

## 2020-10-11 PROCEDURE — C9113 INJ PANTOPRAZOLE SODIUM, VIA: HCPCS | Performed by: NURSE PRACTITIONER

## 2020-10-11 RX ORDER — INSULIN GLARGINE 100 [IU]/ML
25 INJECTION, SOLUTION SUBCUTANEOUS ONCE
Status: COMPLETED | OUTPATIENT
Start: 2020-10-11 | End: 2020-10-11

## 2020-10-11 RX ADMIN — AMPICILLIN SODIUM AND SULBACTAM SODIUM 3 G: 2; 1 INJECTION, POWDER, FOR SOLUTION INTRAMUSCULAR; INTRAVENOUS at 19:54

## 2020-10-11 RX ADMIN — HEPARIN SODIUM 5000 UNITS: 5000 INJECTION INTRAVENOUS; SUBCUTANEOUS at 12:58

## 2020-10-11 RX ADMIN — INSULIN GLARGINE 25 UNITS: 100 INJECTION, SOLUTION SUBCUTANEOUS at 21:15

## 2020-10-11 RX ADMIN — HEPARIN SODIUM 5000 UNITS: 5000 INJECTION INTRAVENOUS; SUBCUTANEOUS at 21:14

## 2020-10-11 RX ADMIN — PANTOPRAZOLE SODIUM 40 MG: 40 INJECTION, POWDER, FOR SOLUTION INTRAVENOUS at 08:45

## 2020-10-11 RX ADMIN — AMPICILLIN SODIUM AND SULBACTAM SODIUM 3 G: 2; 1 INJECTION, POWDER, FOR SOLUTION INTRAMUSCULAR; INTRAVENOUS at 02:46

## 2020-10-11 RX ADMIN — POTASSIUM CHLORIDE 10 MEQ: 1500 TABLET, EXTENDED RELEASE ORAL at 19:54

## 2020-10-11 RX ADMIN — POTASSIUM CHLORIDE 10 MEQ: 1500 TABLET, EXTENDED RELEASE ORAL at 08:44

## 2020-10-11 RX ADMIN — ASPIRIN 81 MG: 81 TABLET, CHEWABLE ORAL at 08:45

## 2020-10-11 RX ADMIN — LOSARTAN POTASSIUM 25 MG: 25 TABLET, FILM COATED ORAL at 08:44

## 2020-10-11 RX ADMIN — Medication 1 CAPSULE: at 08:45

## 2020-10-11 RX ADMIN — HEPARIN SODIUM 5000 UNITS: 5000 INJECTION INTRAVENOUS; SUBCUTANEOUS at 04:43

## 2020-10-11 RX ADMIN — AMPICILLIN SODIUM AND SULBACTAM SODIUM 3 G: 2; 1 INJECTION, POWDER, FOR SOLUTION INTRAMUSCULAR; INTRAVENOUS at 13:03

## 2020-10-11 RX ADMIN — LEVOTHYROXINE SODIUM 175 MCG: 150 TABLET ORAL at 04:47

## 2020-10-11 RX ADMIN — AMPICILLIN SODIUM AND SULBACTAM SODIUM 3 G: 2; 1 INJECTION, POWDER, FOR SOLUTION INTRAMUSCULAR; INTRAVENOUS at 08:44

## 2020-10-11 RX ADMIN — METOPROLOL SUCCINATE 50 MG: 50 TABLET, EXTENDED RELEASE ORAL at 08:44

## 2020-10-11 RX ADMIN — AMIODARONE HYDROCHLORIDE 200 MG: 200 TABLET ORAL at 08:45

## 2020-10-11 ASSESSMENT — PAIN SCALES - GENERAL
PAINLEVEL_OUTOF10: 0
PAINLEVEL_OUTOF10: 0

## 2020-10-11 NOTE — PROGRESS NOTES
Progress Note( Dr. Jacinto Lomax)    Subjective:   Admit Date: 10/6/2020  PCP: Jaret Nesbitt DO    Admitted For : Altered mental state patient was in DKA    Consulted For: Rate control of blood glucose    Interval History: He seemed to be much more alert this morning apparently he ate supper last night and   Running to eat breakfast this morning    Denies any chest pain  Highly short of breath  Has no diarrhea and any GI symptoms at this time    Bladder function seem to be okay    Intake/Output Summary (Last 24 hours) at 10/11/2020 1143  Last data filed at 10/11/2020 0643  Gross per 24 hour   Intake 1180 ml   Output 2300 ml   Net -1120 ml       DATA    CBC:   Recent Labs     10/09/20  0614 10/10/20  0559 10/11/20  0455   WBC 15.0* 13.3* 11.3*   HGB 14.1 13.8 13.2*    290 298    CMP:  Recent Labs     10/09/20  0614 10/10/20  0559 10/11/20  0455    140 137   K 3.4* 3.2* 3.5    103 101   CO2 26 24 28   BUN 6 6 6   CREATININE 0.7* 0.7* 0.6*   CALCIUM 8.6 8.5 8.7     Lipids: No results found for: CHOL, HDL, TRIG  Glucose:  Recent Labs     10/10/20  1959 10/11/20  0235 10/11/20  0841   POCGLU 260* 207* 184*     [id number]:No results found for: LABA1C  High Sensitivity TSH:   Lab Results   Component Value Date    TSHHS 3.490 10/06/2020     Free T3: No results found for: FT3  Free T4:No results found for: T4FREE    Ct Head Wo Contrast    Result Date: 10/6/2020  EXAMINATION: CT OF THE HEAD WITHOUT CONTRAST  10/6/2020 3:44 pm     No acute intracranial abnormality. Ct Abdomen Pelvis W Iv Contrast Additional Contrast? None    Result Date: 10/6/2020  EXAMINATION: CT OF THE ABDOMEN AND PELVIS WITH CONTRAST 10/6/2020 4:07 pm    No acute abnormality noted in the abdomen or pelvis. Cta Pulmonary W Contrast    Result Date: 10/6/2020  EXAMINATION: CTA OF THE CHEST 10/6/2020 5:01 pm     Extensive cardiac and respiratory motion through the hilar regions challenge evaluation.   No definite central emboli identified. Up to 7 mm nodule right upper lobe. Fleischner Society guidelines for follow-up and management of incidentally detected pulmonary nodules: Single Solid Nodule: Nodule size less than 6 mm In a low-risk patient, no routine follow-up. In a high-risk patient, optional CT at 12 months. Nodule size equals 6-8 mm In a low-risk patient, CT at 6-12 months, then consider CT at 18-24 months. In a high-risk patient, CT at 6-12 months, then CT at 18-24 months. Nodule size greater than 8 mm In a low-risk patient, consider CT at 3 months, PET/CT, or tissue sampling. In a high-risk patient, consider CT at 3 months, PET/CT, or tissue sampling. Multiple Solid Nodules: Nodule size less than 6 mm In a low-risk patient, no routine follow-up. In a high-risk patient, optional CT at 12 months. Nodule size equals 6-8 mm In a low-risk patient, CT at 3-6 months, then consider CT at 18-24 months. In a high-risk patient, CT at 3-6 months, then CT at 18-24 months. Nodule size greater than 8 mm In a low-risk patient, CT at 3-6 months, then consider CT at 18-24 months. In a high-risk patient, CT at 3-6 months, then CT at 18-24 months. - Low risk patients include individuals with minimal or absent history of smoking and other known risk factors. - High risk patients include individuals with a history or smoking or known risk factors. Radiology 2017 http://pubs. rsna.org/doi/full/10.1148/radiol. 5636259569     Xr Abdomen For Ng/og/ne Tube Placement    Result Date: 10/7/2020  EXAMINATION: ONE SUPINE XRAY VIEW(S) OF THE ABDOMEN 10/7/2020 12:31 pm COMPARISON: None. HISTORY: ORDERING SYSTEM PROVIDED HISTORY: Confirmation of course of NG/OG/NE tube and location of tip of tube TECHNOLOGIST PROVIDED HISTORY: Reason for exam:->Confirmation of course of NG/OG/NE tube and location of tip of tube Portable? ->Yes Reason for Exam: Confirmation of course of NG/OG/NE tube and location of tip of tube FINDINGS: Enteric catheter is present.   The side port is at the level of the GE junction. Tip projects over the gastric fundus. Right mid lung opacification. Enteric catheter with side port at the level of GE junction. Recommend advancing 3-4 cm to ensure side port is well below the GE junction. Scheduled Medicines   Medications:    insulin glargine  50 Units Subcutaneous Nightly    insulin lispro  0-12 Units Subcutaneous 2 times per day    metoprolol succinate  50 mg Oral Daily    lactobacillus  1 capsule Oral Daily with breakfast    potassium chloride  10 mEq Oral BID    insulin lispro  15 Units Subcutaneous TID WC    insulin lispro  0-12 Units Subcutaneous TID WC    influenza virus vaccine  0.5 mL Intramuscular Once    losartan  25 mg Oral Daily    amiodarone  200 mg Oral Daily    ampicillin-sulbactam  3 g Intravenous Q6H    aspirin  81 mg Oral Daily    heparin (porcine)  5,000 Units Subcutaneous 3 times per day    levothyroxine  175 mcg Oral Daily    pantoprazole  40 mg Intravenous Daily      Infusions:    dextrose      dextrose 5 % and 0.45 % NaCl 150 mL/hr at 10/08/20 0159         Objective:   Vitals: /76   Pulse 78   Temp 98.1 °F (36.7 °C) (Oral)   Resp 16   Ht 5' 11\" (1.803 m)   Wt 259 lb (117.5 kg)   SpO2 96%   BMI 36.12 kg/m²   General appearance: alert and little more cooperative with exam confused  Neck: no JVD or bruit  Thyroid : Normal lobes   Lungs: Has Vesicular Breath sounds   Heart:  regular rate and rhythm  Abdomen: soft, non-tender; bowel sounds normal; no masses,  no organomegaly  Musculoskeletal: Normal  Extremities: extremities normal, , no edema  Neurologic:  Awake, drowsy unable to get any information oriented to name, not to place and time. Cranial nerves II-XII are grossly intact. Motor weakness. Sensory is intact. ,  and gait is normal.    Assessment:     Patient Active Problem List:     DKA, type 2, not at goal [facility])     Altered mental state      Supraventricular tachycardia      Plan: 1. Reviewed POC blood glucose . Labs and X ray results   2. Reviewed Current Medicines   3. Discontinue IV insulin infusion drip protocol   4. On meal plus correction bolus Humalog and Lantus insulin regime  5. Monitor Blood glucose frequently   6. Modified  the dose of Insulin/ other medicines as needed   7. Patient had normal cardiac arrhythmias. Cardiology to be consulted  8.   9. Will follow     .      Karlyn Lefort, MD

## 2020-10-11 NOTE — PROGRESS NOTES
Progress Note( Dr. Jacinto Lomax)  10/11/2020  Subjective:   Admit Date: 10/6/2020  PCP: Jaret Nesbitt DO    Admitted For : Altered mental state patient was in DKA    Consulted For: Rate control of blood glucose    Interval History: He seemed to be much more alert this morning apparently he ate supper last night and   Running to eat breakfast this morning    Denies any chest pain  Highly short of breath  Has no diarrhea and any GI symptoms at this time    Bladder function seem to be okay    Intake/Output Summary (Last 24 hours) at 10/11/2020 1142  Last data filed at 10/11/2020 0643  Gross per 24 hour   Intake 1180 ml   Output 2300 ml   Net -1120 ml       DATA    CBC:   Recent Labs     10/09/20  0614 10/10/20  0559 10/11/20  0455   WBC 15.0* 13.3* 11.3*   HGB 14.1 13.8 13.2*    290 298    CMP:  Recent Labs     10/09/20  0614 10/10/20  0559 10/11/20  0455    140 137   K 3.4* 3.2* 3.5    103 101   CO2 26 24 28   BUN 6 6 6   CREATININE 0.7* 0.7* 0.6*   CALCIUM 8.6 8.5 8.7     Lipids: No results found for: CHOL, HDL, TRIG  Glucose:  Recent Labs     10/10/20  1959 10/11/20  0235 10/11/20  0841   POCGLU 260* 207* 184*     [id number]:No results found for: LABA1C  High Sensitivity TSH:   Lab Results   Component Value Date    TSHHS 3.490 10/06/2020     Free T3: No results found for: FT3  Free T4:No results found for: T4FREE    Ct Head Wo Contrast    Result Date: 10/6/2020  EXAMINATION: CT OF THE HEAD WITHOUT CONTRAST  10/6/2020 3:44 pm     No acute intracranial abnormality. Ct Abdomen Pelvis W Iv Contrast Additional Contrast? None    Result Date: 10/6/2020  EXAMINATION: CT OF THE ABDOMEN AND PELVIS WITH CONTRAST 10/6/2020 4:07 pm    No acute abnormality noted in the abdomen or pelvis. Cta Pulmonary W Contrast    Result Date: 10/6/2020  EXAMINATION: CTA OF THE CHEST 10/6/2020 5:01 pm     Extensive cardiac and respiratory motion through the hilar regions challenge evaluation.   No definite central port is at the level of the GE junction. Tip projects over the gastric fundus. Right mid lung opacification. Enteric catheter with side port at the level of GE junction. Recommend advancing 3-4 cm to ensure side port is well below the GE junction. Scheduled Medicines   Medications:    insulin glargine  50 Units Subcutaneous Nightly    insulin lispro  0-12 Units Subcutaneous 2 times per day    metoprolol succinate  50 mg Oral Daily    lactobacillus  1 capsule Oral Daily with breakfast    potassium chloride  10 mEq Oral BID    insulin lispro  15 Units Subcutaneous TID WC    insulin lispro  0-12 Units Subcutaneous TID WC    influenza virus vaccine  0.5 mL Intramuscular Once    losartan  25 mg Oral Daily    amiodarone  200 mg Oral Daily    ampicillin-sulbactam  3 g Intravenous Q6H    aspirin  81 mg Oral Daily    heparin (porcine)  5,000 Units Subcutaneous 3 times per day    levothyroxine  175 mcg Oral Daily    pantoprazole  40 mg Intravenous Daily      Infusions:    dextrose      dextrose 5 % and 0.45 % NaCl 150 mL/hr at 10/08/20 0159         Objective:   Vitals: /76   Pulse 78   Temp 98.1 °F (36.7 °C) (Oral)   Resp 16   Ht 5' 11\" (1.803 m)   Wt 259 lb (117.5 kg)   SpO2 96%   BMI 36.12 kg/m²   General appearance: alert and little more cooperative with exam confused  Neck: no JVD or bruit  Thyroid : Normal lobes   Lungs: Has Vesicular Breath sounds   Heart:  regular rate and rhythm  Abdomen: soft, non-tender; bowel sounds normal; no masses,  no organomegaly  Musculoskeletal: Normal  Extremities: extremities normal, , no edema  Neurologic:  Awake, drowsy unable to get any information oriented to name, not to place and time. Cranial nerves II-XII are grossly intact. Motor weakness. Sensory is intact. ,  and gait is normal.    Assessment:     Patient Active Problem List:     DKA, type 2, not at goal [facility])     Altered mental state      Supraventricular tachycardia      Plan: 1. Reviewed POC blood glucose . Labs and X ray results   2. Reviewed Current Medicines   3. Discontinue IV insulin infusion drip protocol   4. On meal plus correction bolus Humalog and Lantus insulin regime  5. Monitor Blood glucose frequently   6. Modified  the dose of Insulin/ other medicines as needed   7. Patient had normal cardiac arrhythmias. Cardiology to be consulted  8.   9. Will follow     .      Jackie Issa MD
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
## 2020-10-11 NOTE — PROGRESS NOTES
Hospitalist Progress Note      Name:  Jennifer Perdomo /Age/Sex: 1950  (78 y.o. male)   MRN & CSN:  [id number] & [id number] Admission Date/Time: 10/6/2020  3:23 PM   [facility]  [facility] PCP: Bar Kim Day: 6    Assessment and Plan:   Aleksandra Khan a 78 y. o.  male  who presents with altered mental status.    1) DKA due to sepsis and  Noncompliance  -Received IV NS for rehydration  -Reecieved insulin drip based on DKA Protocol  -Endo on board; resumed Sc insulin     2) Sepsis 2/2 concerning for viral meningitis  -CT Chest and A/P: Non acute  -Also concerning for dental Caries  -CRP and Procal elevated; will trend  -LP showed mildly elevated WBC; with monocytosis  -BC: NGTD and CSF HSV was negative. [place] Ab pending  -ID on board; on IV Unasyn     3) Acute Metabolic Encephalopathy  -Improved  -CT Head: Non acute  -Continue to monitor     4) Electrolyte imbalance due refeeding syndrome  -Hypokalemia, Hypophosphatemia, Hypomagnesemia  -Monitor and replace as needed      5) Elevated troponin  -Had episodes of NSVT  -Cardiology on board; continue amiod and metoprolol     6) Incidental Pulmonary nodule RUL, 7 mm on CT chest  -Will need outpatient f/u in 6 - 12 months        Chronic medical conditions  - HTN, Continue home meds  - Hypothyroidism- Cont synthroid  -History of CHF, lasix on hold  -Left AKA    PT/OT to assess for SNF    Diet DIET GENERAL; Dysphagia Soft and Bite-Sized   DVT Prophylaxis [] Lovenox, []  Heparin, [] SCDs, [] Ambulation   GI Prophylaxis [] PPI,  [] H2 Blocker,  [] Carafate,  [] Diet/Tube Feeds   Code Status Full Code   Disposition SNF   MDM      History of Present Illness:     Patient was seen and examined  Feeling better  No fever, chills, N/V/D  No chest pain or SOB         Ten point ROS reviewed negative, unless as noted above    Objective:        Intake/Output Summary (Last 24 hours) at 10/11/2020 0955  Last data filed at 10/11/2020 (porcine)  5,000 Units Subcutaneous 3 times per day    levothyroxine  175 mcg Oral Daily    pantoprazole  40 mg Intravenous Daily      Infusions:    dextrose      dextrose 5 % and 0.45 % NaCl 150 mL/hr at 10/08/20 0159     PRN Meds: glucose, 15 g, PRN  glucagon (rDNA), 1 mg, PRN  dextrose, 100 mL/hr, PRN  dextrose, 12.5 g, PRN  potassium chloride, 10 mEq, PRN  magnesium sulfate, 1 g, PRN  sodium phosphate IVPB, 10 mmol, PRN    Or  sodium phosphate IVPB, 15 mmol, PRN    Or  sodium phosphate IVPB, 20 mmol, PRN  dextrose 5 % and 0.45 % NaCl, , Continuous PRN          Electronically signed by Alexandra Bey MD on 10/11/2020 at 9:55 AM

## 2020-10-11 NOTE — PLAN OF CARE
Problem: Falls - Risk of:  Goal: Will remain free from falls  Description: Will remain free from falls  10/11/2020 1953 by Nancy Reyna RN  Outcome: Ongoing     Problem: Falls - Risk of:  Goal: Absence of physical injury  Description: Absence of physical injury  10/11/2020 1953 by Nancy Reyna RN  Outcome: Ongoing     Problem: Skin Integrity:  Goal: Will show no infection signs and symptoms  Description: Will show no infection signs and symptoms  10/11/2020 1953 by Nancy Reyna RN  Outcome: Ongoing     Problem: Skin Integrity:  Goal: Absence of new skin breakdown  Description: Absence of new skin breakdown  10/11/2020 1953 by Nancy Reyna RN  Outcome: Ongoing     Problem: Restraint Use - Nonviolent/Non-Self-Destructive Behavior:  Goal: Absence of restraint indications  Description: Absence of restraint indications  10/11/2020 1953 by Nancy Reyna RN  Outcome: Ongoing     Problem: Restraint Use - Nonviolent/Non-Self-Destructive Behavior:  Goal: Absence of restraint-related injury  Description: Absence of restraint-related injury  10/11/2020 1953 by Nancy Reyna RN  Outcome: Ongoing     Problem: Confusion - Acute:  Goal: Absence of continued neurological deterioration signs and symptoms  Description: Absence of continued neurological deterioration signs and symptoms  10/11/2020 1953 by Nancy Reyna RN  Outcome: Ongoing     Problem: Confusion - Acute:  Goal: Mental status will be restored to baseline  Description: Mental status will be restored to baseline  10/11/2020 1953 by Nancy Reyna RN  Outcome: Ongoing     Problem: Discharge Planning:  Goal: Ability to perform activities of daily living will improve  Description: Ability to perform activities of daily living will improve  10/11/2020 1953 by Nancy Reyna RN  Outcome: Ongoing     Problem: Discharge Planning:  Goal: Participates in care planning  Description: Participates in care planning  10/11/2020 1953 by Nancy Reyna RN  Outcome: Ongoing     Problem: Injury - Risk of, Physical Injury:  Goal: Will remain free from falls  Description: Will remain free from falls  10/11/2020 1953 by Brent Johnson RN  Outcome: Ongoing     Problem: Injury - Risk of, Physical Injury:  Goal: Absence of physical injury  Description: Absence of physical injury  10/11/2020 1953 by Brent Johnson RN  Outcome: Ongoing     Problem: Mood - Altered:  Goal: Mood stable  Description: Mood stable  10/11/2020 1953 by Brent Johnson RN  Outcome: Ongoing     Problem: Mood - Altered:  Goal: Absence of abusive behavior  Description: Absence of abusive behavior  10/11/2020 1953 by Brent Johnson RN  Outcome: Ongoing     Problem: Mood - Altered:  Goal: Verbalizations of feeling emotionally comfortable while being cared for will increase  Description: Verbalizations of feeling emotionally comfortable while being cared for will increase  10/11/2020 1953 by Brent Johnson RN  Outcome: Ongoing     Problem: Psychomotor Activity - Altered:  Goal: Absence of psychomotor disturbance signs and symptoms  Description: Absence of psychomotor disturbance signs and symptoms  10/11/2020 1953 by Brent Johnson RN  Outcome: Ongoing     Problem: Sensory Perception - Impaired:  Goal: Demonstrations of improved sensory functioning will increase  Description: Demonstrations of improved sensory functioning will increase  10/11/2020 1953 by Brent Johnson RN  Outcome: Ongoing     Problem: Sensory Perception - Impaired:  Goal: Decrease in sensory misperception frequency  Description: Decrease in sensory misperception frequency  10/11/2020 1953 by Brent Johnson RN  Outcome: Ongoing     Problem: Sensory Perception - Impaired:  Goal: Able to refrain from responding to false sensory perceptions  Description: Able to refrain from responding to false sensory perceptions  10/11/2020 1953 by Brent Johnson RN  Outcome: Ongoing     Problem: Sensory Perception - Impaired:  Goal: Demonstrates accurate environmental perceptions  Description: Demonstrates accurate environmental perceptions  10/11/2020 1953 by Charla Nelson RN  Outcome: Ongoing     Problem: Sensory Perception - Impaired:  Goal: Able to distinguish between reality-based and nonreality-based thinking  Description: Able to distinguish between reality-based and nonreality-based thinking  10/11/2020 1953 by Charla Nelson RN  Outcome: Ongoing     Problem: Sensory Perception - Impaired:  Goal: Able to interrupt nonreality-based thinking  Description: Able to interrupt nonreality-based thinking  10/11/2020 1953 by Charla Nelson RN  Outcome: Ongoing     Problem: Sleep Pattern Disturbance:  Goal: Appears well-rested  Description: Appears well-rested  10/11/2020 1953 by Charla Nelson RN  Outcome: Ongoing     Problem: Pain:  Description: Pain management should include both nonpharmacologic and pharmacologic interventions. Goal: Pain level will decrease  Description: Pain level will decrease  10/11/2020 1953 by Charla Nelson RN  Outcome: Ongoing     Problem: Pain:  Description: Pain management should include both nonpharmacologic and pharmacologic interventions. Goal: Control of acute pain  Description: Control of acute pain  10/11/2020 1953 by Charla Nelson RN  Outcome: Ongoing     Problem: Pain:  Description: Pain management should include both nonpharmacologic and pharmacologic interventions.   Goal: Control of chronic pain  Description: Control of chronic pain  10/11/2020 1953 by Charla Nelson RN  Outcome: Ongoing

## 2020-10-12 ENCOUNTER — HOSPITAL ENCOUNTER (INPATIENT)
Age: 70
LOS: 8 days | Discharge: HOME | DRG: 948 | End: 2020-10-20
Attending: INTERNAL MEDICINE | Admitting: INTERNAL MEDICINE
Payer: MEDICARE

## 2020-10-12 VITALS
HEART RATE: 80 BPM | TEMPERATURE: 98 F | BODY MASS INDEX: 35.8 KG/M2 | DIASTOLIC BLOOD PRESSURE: 77 MMHG | WEIGHT: 255.7 LBS | OXYGEN SATURATION: 95 % | RESPIRATION RATE: 16 BRPM | SYSTOLIC BLOOD PRESSURE: 118 MMHG | HEIGHT: 71 IN

## 2020-10-12 LAB
GLUCOSE BLD-MCNC: 133 MG/DL (ref 70–99)
GLUCOSE BLD-MCNC: 138 MG/DL (ref 70–99)
GLUCOSE BLD-MCNC: 184 MG/DL (ref 70–99)
GLUCOSE BLD-MCNC: 280 MG/DL (ref 70–99)
GLUCOSE BLD-MCNC: 337 MG/DL (ref 70–99)
SARS-COV-2, NAAT: NOT DETECTED
SOURCE: NORMAL
WEST NILE VIRUS, IGG: 0.22 IV
WEST NILE VIRUS, IGM: 0 IV

## 2020-10-12 PROCEDURE — 97162 PT EVAL MOD COMPLEX 30 MIN: CPT

## 2020-10-12 PROCEDURE — 1200000002 HC SEMI PRIVATE SWING BED

## 2020-10-12 PROCEDURE — 97530 THERAPEUTIC ACTIVITIES: CPT

## 2020-10-12 PROCEDURE — 2580000003 HC RX 258: Performed by: INTERNAL MEDICINE

## 2020-10-12 PROCEDURE — 6370000000 HC RX 637 (ALT 250 FOR IP): Performed by: NURSE PRACTITIONER

## 2020-10-12 PROCEDURE — C9113 INJ PANTOPRAZOLE SODIUM, VIA: HCPCS | Performed by: NURSE PRACTITIONER

## 2020-10-12 PROCEDURE — 6360000002 HC RX W HCPCS: Performed by: INTERNAL MEDICINE

## 2020-10-12 PROCEDURE — 6370000000 HC RX 637 (ALT 250 FOR IP): Performed by: INTERNAL MEDICINE

## 2020-10-12 PROCEDURE — 82962 GLUCOSE BLOOD TEST: CPT

## 2020-10-12 PROCEDURE — 99232 SBSQ HOSP IP/OBS MODERATE 35: CPT | Performed by: INTERNAL MEDICINE

## 2020-10-12 PROCEDURE — 94761 N-INVAS EAR/PLS OXIMETRY MLT: CPT

## 2020-10-12 PROCEDURE — U0002 COVID-19 LAB TEST NON-CDC: HCPCS

## 2020-10-12 PROCEDURE — 97166 OT EVAL MOD COMPLEX 45 MIN: CPT

## 2020-10-12 PROCEDURE — 92526 ORAL FUNCTION THERAPY: CPT

## 2020-10-12 PROCEDURE — 6360000002 HC RX W HCPCS: Performed by: NURSE PRACTITIONER

## 2020-10-12 RX ORDER — PANTOPRAZOLE SODIUM 40 MG/1
40 TABLET, DELAYED RELEASE ORAL
Status: DISCONTINUED | OUTPATIENT
Start: 2020-10-13 | End: 2020-10-20 | Stop reason: HOSPADM

## 2020-10-12 RX ORDER — HEPARIN SODIUM 5000 [USP'U]/ML
5000 INJECTION, SOLUTION INTRAVENOUS; SUBCUTANEOUS EVERY 8 HOURS SCHEDULED
Status: CANCELLED | OUTPATIENT
Start: 2020-10-12

## 2020-10-12 RX ORDER — ASPIRIN 81 MG/1
81 TABLET, CHEWABLE ORAL DAILY
Status: CANCELLED | OUTPATIENT
Start: 2020-10-13

## 2020-10-12 RX ORDER — LACTOBACILLUS RHAMNOSUS GG 10B CELL
1 CAPSULE ORAL
Status: DISCONTINUED | OUTPATIENT
Start: 2020-10-13 | End: 2020-10-20 | Stop reason: HOSPADM

## 2020-10-12 RX ORDER — DEXTROSE MONOHYDRATE 25 G/50ML
12.5 INJECTION, SOLUTION INTRAVENOUS PRN
Status: DISCONTINUED | OUTPATIENT
Start: 2020-10-12 | End: 2020-10-20 | Stop reason: HOSPADM

## 2020-10-12 RX ORDER — POTASSIUM CHLORIDE 20 MEQ/1
10 TABLET, EXTENDED RELEASE ORAL 2 TIMES DAILY
Status: CANCELLED | OUTPATIENT
Start: 2020-10-12

## 2020-10-12 RX ORDER — DEXTROSE MONOHYDRATE 50 MG/ML
100 INJECTION, SOLUTION INTRAVENOUS PRN
Status: DISCONTINUED | OUTPATIENT
Start: 2020-10-12 | End: 2020-10-20 | Stop reason: HOSPADM

## 2020-10-12 RX ORDER — ASPIRIN 81 MG/1
81 TABLET, CHEWABLE ORAL DAILY
Status: DISCONTINUED | OUTPATIENT
Start: 2020-10-13 | End: 2020-10-20 | Stop reason: HOSPADM

## 2020-10-12 RX ORDER — MAGNESIUM SULFATE 1 G/100ML
1 INJECTION INTRAVENOUS PRN
Status: DISCONTINUED | OUTPATIENT
Start: 2020-10-12 | End: 2020-10-20 | Stop reason: HOSPADM

## 2020-10-12 RX ORDER — NICOTINE POLACRILEX 4 MG
15 LOZENGE BUCCAL PRN
Status: CANCELLED | OUTPATIENT
Start: 2020-10-12

## 2020-10-12 RX ORDER — POTASSIUM CHLORIDE 7.45 MG/ML
10 INJECTION INTRAVENOUS PRN
Status: CANCELLED | OUTPATIENT
Start: 2020-10-12

## 2020-10-12 RX ORDER — DEXTROSE AND SODIUM CHLORIDE 5; .45 G/100ML; G/100ML
INJECTION, SOLUTION INTRAVENOUS CONTINUOUS PRN
Status: DISCONTINUED | OUTPATIENT
Start: 2020-10-12 | End: 2020-10-20 | Stop reason: HOSPADM

## 2020-10-12 RX ORDER — DEXTROSE AND SODIUM CHLORIDE 5; .45 G/100ML; G/100ML
INJECTION, SOLUTION INTRAVENOUS CONTINUOUS PRN
Status: CANCELLED | OUTPATIENT
Start: 2020-10-12

## 2020-10-12 RX ORDER — LOSARTAN POTASSIUM 25 MG/1
25 TABLET ORAL DAILY
Status: CANCELLED | OUTPATIENT
Start: 2020-10-13

## 2020-10-12 RX ORDER — HEPARIN SODIUM 5000 [USP'U]/ML
5000 INJECTION, SOLUTION INTRAVENOUS; SUBCUTANEOUS EVERY 8 HOURS SCHEDULED
Status: DISCONTINUED | OUTPATIENT
Start: 2020-10-12 | End: 2020-10-17

## 2020-10-12 RX ORDER — PANTOPRAZOLE SODIUM 40 MG/1
40 TABLET, DELAYED RELEASE ORAL
Status: CANCELLED | OUTPATIENT
Start: 2020-10-13

## 2020-10-12 RX ORDER — DEXTROSE MONOHYDRATE 25 G/50ML
12.5 INJECTION, SOLUTION INTRAVENOUS PRN
Status: CANCELLED | OUTPATIENT
Start: 2020-10-12

## 2020-10-12 RX ORDER — POTASSIUM CHLORIDE 7.45 MG/ML
10 INJECTION INTRAVENOUS PRN
Status: DISCONTINUED | OUTPATIENT
Start: 2020-10-12 | End: 2020-10-20 | Stop reason: HOSPADM

## 2020-10-12 RX ORDER — MAGNESIUM SULFATE 1 G/100ML
1 INJECTION INTRAVENOUS PRN
Status: CANCELLED | OUTPATIENT
Start: 2020-10-12

## 2020-10-12 RX ORDER — LACTOBACILLUS RHAMNOSUS GG 10B CELL
1 CAPSULE ORAL
Status: CANCELLED | OUTPATIENT
Start: 2020-10-13

## 2020-10-12 RX ORDER — NICOTINE POLACRILEX 4 MG
15 LOZENGE BUCCAL PRN
Status: DISCONTINUED | OUTPATIENT
Start: 2020-10-12 | End: 2020-10-20 | Stop reason: HOSPADM

## 2020-10-12 RX ORDER — METOPROLOL SUCCINATE 50 MG/1
50 TABLET, EXTENDED RELEASE ORAL DAILY
Status: DISCONTINUED | OUTPATIENT
Start: 2020-10-13 | End: 2020-10-20 | Stop reason: HOSPADM

## 2020-10-12 RX ORDER — DEXTROSE MONOHYDRATE 50 MG/ML
100 INJECTION, SOLUTION INTRAVENOUS PRN
Status: CANCELLED | OUTPATIENT
Start: 2020-10-12

## 2020-10-12 RX ORDER — AMIODARONE HYDROCHLORIDE 200 MG/1
200 TABLET ORAL DAILY
Status: DISCONTINUED | OUTPATIENT
Start: 2020-10-13 | End: 2020-10-20 | Stop reason: HOSPADM

## 2020-10-12 RX ORDER — AMIODARONE HYDROCHLORIDE 200 MG/1
200 TABLET ORAL DAILY
Status: CANCELLED | OUTPATIENT
Start: 2020-10-13

## 2020-10-12 RX ORDER — METOPROLOL SUCCINATE 50 MG/1
50 TABLET, EXTENDED RELEASE ORAL DAILY
Status: CANCELLED | OUTPATIENT
Start: 2020-10-13

## 2020-10-12 RX ORDER — INSULIN GLARGINE 100 [IU]/ML
50 INJECTION, SOLUTION SUBCUTANEOUS NIGHTLY
Status: CANCELLED | OUTPATIENT
Start: 2020-10-12

## 2020-10-12 RX ORDER — POTASSIUM CHLORIDE 750 MG/1
10 TABLET, EXTENDED RELEASE ORAL 2 TIMES DAILY
Status: DISCONTINUED | OUTPATIENT
Start: 2020-10-13 | End: 2020-10-20 | Stop reason: HOSPADM

## 2020-10-12 RX ORDER — LOSARTAN POTASSIUM 25 MG/1
25 TABLET ORAL DAILY
Status: DISCONTINUED | OUTPATIENT
Start: 2020-10-13 | End: 2020-10-20 | Stop reason: HOSPADM

## 2020-10-12 RX ADMIN — ASPIRIN 81 MG: 81 TABLET, CHEWABLE ORAL at 11:21

## 2020-10-12 RX ADMIN — PANTOPRAZOLE SODIUM 40 MG: 40 INJECTION, POWDER, FOR SOLUTION INTRAVENOUS at 11:20

## 2020-10-12 RX ADMIN — LEVOTHYROXINE SODIUM 175 MCG: 150 TABLET ORAL at 05:35

## 2020-10-12 RX ADMIN — LOSARTAN POTASSIUM 25 MG: 25 TABLET, FILM COATED ORAL at 11:21

## 2020-10-12 RX ADMIN — HEPARIN SODIUM 5000 UNITS: 5000 INJECTION INTRAVENOUS; SUBCUTANEOUS at 23:11

## 2020-10-12 RX ADMIN — AMPICILLIN SODIUM AND SULBACTAM SODIUM 3 G: 2; 1 INJECTION, POWDER, FOR SOLUTION INTRAMUSCULAR; INTRAVENOUS at 23:11

## 2020-10-12 RX ADMIN — METOPROLOL SUCCINATE 50 MG: 50 TABLET, EXTENDED RELEASE ORAL at 11:23

## 2020-10-12 RX ADMIN — AMPICILLIN SODIUM AND SULBACTAM SODIUM 3 G: 2; 1 INJECTION, POWDER, FOR SOLUTION INTRAMUSCULAR; INTRAVENOUS at 01:43

## 2020-10-12 RX ADMIN — AMPICILLIN SODIUM AND SULBACTAM SODIUM 3 G: 2; 1 INJECTION, POWDER, FOR SOLUTION INTRAMUSCULAR; INTRAVENOUS at 14:41

## 2020-10-12 RX ADMIN — HEPARIN SODIUM 5000 UNITS: 5000 INJECTION INTRAVENOUS; SUBCUTANEOUS at 05:35

## 2020-10-12 RX ADMIN — HEPARIN SODIUM 5000 UNITS: 5000 INJECTION INTRAVENOUS; SUBCUTANEOUS at 14:42

## 2020-10-12 RX ADMIN — POTASSIUM CHLORIDE 10 MEQ: 1500 TABLET, EXTENDED RELEASE ORAL at 11:22

## 2020-10-12 RX ADMIN — AMIODARONE HYDROCHLORIDE 200 MG: 200 TABLET ORAL at 11:22

## 2020-10-12 RX ADMIN — Medication 1 CAPSULE: at 11:26

## 2020-10-12 RX ADMIN — AMPICILLIN SODIUM AND SULBACTAM SODIUM 3 G: 2; 1 INJECTION, POWDER, FOR SOLUTION INTRAMUSCULAR; INTRAVENOUS at 11:21

## 2020-10-12 ASSESSMENT — PAIN SCALES - WONG BAKER

## 2020-10-12 ASSESSMENT — PAIN SCALES - GENERAL: PAINLEVEL_OUTOF10: 0

## 2020-10-12 NOTE — CONSULTS
[facility] PHYSICAL THERAPY EVALUATION  Beatriz Client, 1950, 4111/4111-A, 10/12/2020    History  [place]:  The primary encounter diagnosis was Acute metabolic encephalopathy. Diagnoses of Diabetic ketoacidosis without coma associated with type 2 diabetes mellitus ([facility] [address] Severe hyperglycemia due to diabetes mellitus ([facility] [address] High anion gap metabolic acidosis, Metabolic acidosis due to diabetes mellitus ([facility] [address] NSTEMI (non-ST elevated myocardial infarction) ([facility] [address] Acute hyperkalemia, YEE (acute kidney injury) ([facility] [address] Dehydration, Leukocytosis, unspecified type, and 7 mm right upper lobe pulmonary nodule were also pertinent to this visit. Patient  has no past medical history on file. Patient  has no past surgical history on file. Subjective:  Patient states:  Patient amenable to therapy. Pain:  Denies pain. Communication with other providers:  Handoff to RN, co-eval with OT. Restrictions: General rehab precautions, fall risk, hx L AKA    Home Setup/Prior level of function  Social/Functional History  Home Equipment: Rolling walker, 4 wheeled walker  Ambulation Assistance: Independent  Transfer Assistance: Independent    Examination of body systems (includes body structures/functions, activity/participation limitations):  · Observation:  Supine in bed upon arrival  · Vision:  [facility]  · Hearing:  [facility]  · Cardiopulmonary:  Appears intact; no O2 needs and vitals stable throughout session  · Cognition: Appears WFL, oriented x4, see OT/SLP note for further evaluation. Musculoskeletal  · ROM R/L:  WFL. · Strength R/L:  4-/5 RLE, AKA LLE, moderate impairment in function and endurance.     · Neuro:  Appears intact    · Gait pattern: Unable to ambulate    Mobility:  · Supine to sit:  Min assist with use of bed rail and HOB elevated  · Transfers: mod assist x2 to partial stand at wheeled walker; min assist x2 to squat-pivot from elevated bed to recliner  · Sitting balance:  Supervision without UE support. · Standing balance: Mod assist x2 to partial stand at wheeled walker. · Gait: Unable to ambulate at baseline    [facility] 6 Clicks Inpatient Mobility:  AM-PAC Inpatient Mobility Raw Score : 12    Treatment:  Pt supine in bed at start of session and agreeable to participate. For bed mobility, provided cues for hand placement and utilization of bed rail to maximize independence. Cues for hand placement and appropriate weight shifting to facilitate anterior and lateral scooting at EOB. Trialed sit to stand transfers with wheeled walker, pt able to rise to partial standing with modA x2. Tactile and verbal cues for glute muscle activation to facilitate full standing, pt tolerates <30 sec of static standing prior to requiring seated rest break for fatigue. Squat-pivot transfers from elevated bed to recliner with Wilmer x2. Cues for safe hand placement and weight shift. Paola pad underneath pt to facilitate safe transfer back to bed with nursing staff. Safety: patient left in chair, call light within reach, RN notified, gait belt used. Assessment:  78 y.o. male admitted after unresponsive episode, found to be in diabetic ketoacidosis. At baseline, pt reports that he is able to independently transfer to his power wheelchair and is able to stand with a wheeled walker. Pt non-ambulatory d/t LLE AKA. At eval, pt is unable to rise to full standing with a wheeled walker and requires min assist x2 to complete squat-pivot transfers between surfaces. Pt demos deficits in strength, balance, and endurance that are preventing him from safely returning to his baseline status. Pt requires skilled PT to address these deficits to allow pt to safely return to his PLOF. Recommend discharge to SNF. Complexity: Moderate  Prognosis: Good, no significant barriers to participation at this time. Plan Times per week: 3+/week, 1 week,      Equipment:  To be determined at next level of care    Goals:  Short term goals  Time Frame for Short term goals: 1 week  Short term goal 1: Pt will complete bed mobility with modified independence. Short term goal 2: Pt will complete squat-pivot transfers with modified independence. Short term goal 3: Pt will be independent with LE HEP to maximize functional mobility and safety. Treatment plan:  Bed mobility, transfers, balance, gait, TA, TX    Recommendations for NURSING mobility: Mechanical lift bed->chair, up to chair for all meals.     Time:   Time in: 0848  Time out: 0912  Timed treatment minutes: 12  Total time: 24    Electronically signed by:    Karlie Quan PT  10/12/2020, 9:54 AM

## 2020-10-12 NOTE — DISCHARGE INSTR - COC
None    Nursing Mobility/ADLs:  Walking   Dependent  Transfer  Dependent  Bathing  Assisted  Dressing  Assisted  Toileting  Dependent  Feeding  Independent  Med Admin  Assisted  Med Delivery   whole    Wound Care Documentation and Therapy:        Elimination:  Continence:   · Bowel: No  · Bladder: No  Urinary Catheter: None   Colostomy/Ileostomy/Ileal Conduit: No       Date of Last BM: 10-12-20    Intake/Output Summary (Last 24 hours) at 10/12/2020 1210  Last data filed at 10/12/2020 1038  Gross per 24 hour   Intake 640 ml   Output 1300 ml   Net -660 ml     I/O last 3 completed shifts:   In: 480 [P.O.:480]  Out: 1300 [Urine:1300]    Safety Concerns:     None    Impairments/Disabilities:      Amputation - right AKA        Patient's personal belongings (please select all that are sent with patient):  None    RN SIGNATURE:  Electronically signed by Allison Cardona RN on 10/12/20 at 7:55 PM EDT    CASE MANAGEMENT/SOCIAL WORK SECTION    Inpatient Status Date: ***    Readmission Risk Assessment Score:  Readmission Risk              Risk of Unplanned Readmission:        17           Discharging to Facility/ Agency   · Name:   · Address:  · Phone:  · Fax:    Dialysis Facility (if applicable)   · Name:  · Address:  · Dialysis Schedule:  · Phone:  · Fax:    / signature: {Esignature:[id number]}    PHYSICIAN SECTION  Nutrition Therapy:  Current Nutrition Therapy:   [address] GLENNA Diet List:190790251}    Routes of Feeding: {CHP DME Other Feedings:145110598}  Liquids: {Slp liquid thickness:92155}  Daily Fluid Restriction: {CHP DME Yes amt example:637686604}  Last Modified Barium Swallow with Video (Video Swallowing Test): {Done Not Done FBCN:171247285}    Treatments at the Time of Hospital Discharge:   Respiratory Treatments: ***  Oxygen Therapy:  {Therapy; copd oxygen:15481}  Ventilator:    {MH CC Vent IZRT:214173746}    Rehab Therapies: {THERAPEUTIC INTERVENTION:1663378404}  Weight Bearing Status/Restrictions: [address] CC Weight Bearin}  Other Medical Equipment (for information only, NOT a DME order):  {EQUIPMENT:442373105}  Other Treatments: ***      Prognosis: {Prognosis:6316545045}    Condition at Discharge: [address] [place] [address] Patient Condition:307437972}    Rehab Potential (if transferring to Rehab): {Prognosis:3960554042}    Recommended Labs or Other Treatments After Discharge: ***    Physician Certification: I certify the above information and transfer of Rishi Olvera  is necessary for the continuing treatment of the diagnosis listed and that he requires {Admit to Appropriate Level of Care:77571} for {GREATER/LESS:577135203} 30 days.      Update Admission H&P: {CHP DME Changes in XLNIX:907997835}    PHYSICIAN SIGNATURE:  {Esignature:[id number]}

## 2020-10-12 NOTE — PROGRESS NOTES
Hospitalist Progress Note      Name:  Homero Reyes /Age/Sex: 1950  (78 y.o. male)   MRN & CSN:  [id number] & [id number] Admission Date/Time: 10/6/2020  3:23 PM   Location:  [facility]-A PCP: Bar Kim Day: 7    Assessment and Plan:   Geo Wu a 78 y. o.  male  who presents with altered mental status.    1) DKA due to sepsis and  Noncompliance  -Received IV NS for rehydration  -Reecieved insulin drip based on DKA Protocol  -Endo on board; resumed Sc insulin     2) Sepsis 2/2 concerning for viral meningitis  -CT Chest and A/P: Non acute  -Also concerning for dental Caries  -CRP and Procal elevated but trending down  -LP showed mildly elevated WBC; with monocytosis  -BC: NGTD and CSF HSV was negative. [place] Ab pending  -ID on board; on IV Unasyn to end tomorrow     3) Acute Metabolic Encephalopathy  -Improved  -CT Head: Non acute  -Continue to monitor     4) Electrolyte imbalance due refeeding syndrome  -Hypokalemia, Hypophosphatemia, Hypomagnesemia  -Monitor and replace as needed      5) Elevated troponin  -Had episodes of NSVT  -Cardiology on board; continue amiod and metoprolol     6) Incidental Pulmonary nodule RUL, 7 mm on CT chest  -Will need outpatient f/u in 6 - 12 months        Chronic medical conditions  - HTN, Continue home meds  - Hypothyroidism- Cont synthroid  -History of CHF, lasix on hold  -Left AKA     PT/OT to  On board; for ARU vs SNF    Diet DIET GENERAL;   DVT Prophylaxis [] Lovenox, []  Heparin, [] SCDs, [] Ambulation   GI Prophylaxis [] PPI,  [] H2 Blocker,  [] Carafate,  [] Diet/Tube Feeds   Code Status Full Code   Disposition ARU   [facility]      History of Present Illness:     Patient was seen and examined  Denied any any chest pain, SOB  No fever, chills, N/V/D  No dizziness, palpitations       Ten point ROS reviewed negative, unless as noted above    Objective:        Intake/Output Summary (Last 24 hours) at 10/12/2020 1229  Last Daily    heparin (porcine)  5,000 Units Subcutaneous 3 times per day    levothyroxine  175 mcg Oral Daily    pantoprazole  40 mg Intravenous Daily      Infusions:    dextrose      dextrose 5 % and 0.45 % NaCl 150 mL/hr at 10/08/20 0159     PRN Meds: glucose, 15 g, PRN  glucagon (rDNA), 1 mg, PRN  dextrose, 100 mL/hr, PRN  dextrose, 12.5 g, PRN  potassium chloride, 10 mEq, PRN  magnesium sulfate, 1 g, PRN  sodium phosphate IVPB, 10 mmol, PRN    Or  sodium phosphate IVPB, 15 mmol, PRN    Or  sodium phosphate IVPB, 20 mmol, PRN  dextrose 5 % and 0.45 % NaCl, , Continuous PRN          Electronically signed by Maggie Underwood MD on 10/12/2020 at 12:29 PM

## 2020-10-12 NOTE — PROGRESS NOTES
Cardiology Progress Note       Lashay Mandel is a 78 y.o. male   1950     SUBJECTIVE:   Patient seen and examined no chest pain or shortness of breath rhythm is paced  rhythm  OBJECTIVE:    Review of Systems:  General appearance: alert, appears stated age and cooperative  Skin: Skin color, texture, normal. No rashes or lesions  HEENT: No nose bleed, headache, vision problems  CV: C/O chest pain, tightness, pressure,   Respiratory: C/o no SOB, RIVAS, Orthopnea, PND  GI: No abdominal pain, black stool, bloating  Limbs: No c/o edema, pain, swelling, intermittent claudication, joint pains  Neuro: No dizziness, lightheadedness, syncope, gait problems, memory problems  Psych: grossly normal. No SI/depression. Vitals:   Blood pressure (!) 140/76, pulse 80, temperature 98 °F (36.7 °C), temperature source Oral, resp. rate 16, height 5' 11\" (1.803 m), weight 255 lb 11.2 oz (116 kg), SpO2 95 %. HEENT: AT, NC, PERRLA  Neck: No JVD  Heart: S1 S2 audible, no murmur   Lungs: CTA   Abdomen: Nontender   Limbs: No edema   CNS: no focal deficit      No past medical history on file.      Patient Active Problem List   Diagnosis    DKA, type 2, not at goal [facility])    Sepsis with encephalopathy without septic shock ([facility] [address]        No Known Allergies     Current Inpatient Medications:    Current Facility-Administered Medications   Medication Dose Route Frequency Provider Last Rate Last Dose    insulin glargine (LANTUS) injection vial 50 Units  50 Units Subcutaneous Nightly Jackie Issa MD   Stopped at 10/11/20 2017    insulin lispro (HUMALOG) injection vial 0-12 Units  0-12 Units Subcutaneous 2 times per day Jackie Issa MD   2 Units at 10/11/20 1956    metoprolol succinate (TOPROL XL) extended release tablet 50 mg  50 mg Oral Daily Vickey Magallanes MD   50 mg at 10/11/20 0844    lactobacillus (CULTURELLE) capsule 1 capsule  1 capsule Oral Daily with breakfast Sharon Harrell MD   1 capsule at 10/11/20 0853    potassium chloride (KLOR-CON M) extended release tablet 10 mEq  10 mEq Oral BID Angela Delcid MD   10 mEq at 10/11/20 1954    insulin lispro (HUMALOG) injection vial 15 Units  15 Units Subcutaneous TID  Shyanne Davidson MD   15 Units at 10/11/20 1300    insulin lispro (HUMALOG) injection vial 0-12 Units  0-12 Units Subcutaneous TID  Shyanne Davidson MD   4 Units at 10/11/20 1255    influenza quadrivalent split vaccine (FLUZONE;FLUARIX;FLULAVAL;AFLURIA) injection 0.5 mL  0.5 mL Intramuscular Once Rahat Lowe MD        losartan (COZAAR) tablet 25 mg  25 mg Oral Daily Angela Delcid MD   25 mg at 10/11/20 0844    amiodarone (CORDARONE) tablet 200 mg  200 mg Oral Daily Angela Delcid MD   200 mg at 10/11/20 0845    ampicillin-sulbactam (UNASYN) 3 g ivpb minibag  3 g Intravenous Q6H Robin Reid MD   Stopped at 10/12/20 4235    aspirin chewable tablet 81 mg  81 mg Oral Daily Angela Delcid MD   81 mg at 10/11/20 0845    glucose (GLUTOSE) 40 % oral gel 15 g  15 g Oral PRN Teramary Hay Poplin, DO        glucagon (rDNA) injection 1 mg  1 mg Intramuscular PRN Tera GERTRUDIS Talamantes, DO        dextrose 5 % solution  100 mL/hr Intravenous PRN Teramary Talamantes, DO        dextrose 50 % IV solution  12.5 g Intravenous PRN Oxana Christensen APRN - NP        potassium chloride 10 mEq/100 mL IVPB (Peripheral Line)  10 mEq Intravenous PRN Oxana Christensen APRN -  mL/hr at 10/09/20 0707 10 mEq at 10/09/20 0707    magnesium sulfate 1 g in dextrose 5% 100 mL IVPB  1 g Intravenous PRN Oxana Amparo, APRN - NP        sodium phosphate 10 mmol in dextrose 5 % 250 mL IVPB  10 mmol Intravenous PRN Oxana Amparo, APRN - NP        Or    sodium phosphate 15 mmol in dextrose 5 % 250 mL IVPB  15 mmol Intravenous PRN Oxana Christensen APRN - NP        Or    sodium phosphate 20 mmol in dextrose 5 % 500 mL IVPB  20 mmol Intravenous PRN Oxana Christensen APRN - NP        dextrose 5 % and 0.45 % sodium chloride infusion Intravenous Continuous PRN MASOOD Turner  mL/hr at 10/08/20 0159      heparin (porcine) injection 5,000 Units  5,000 Units Subcutaneous 3 times per day MASOOD Turner NP   5,000 Units at 10/12/20 0535    levothyroxine (SYNTHROID) tablet 175 mcg  175 mcg Oral Daily MASOOD Turner NP   175 mcg at 10/12/20 0535    pantoprazole (PROTONIX) injection 40 mg  40 mg Intravenous Daily MASOOD Turner NP   40 mg at 10/11/20 0845           Labs:  CBC with Differential:    Lab Results   Component Value Date    WBC 11.3 10/11/2020    RBC 4.70 10/11/2020    HGB 13.2 10/11/2020    HCT 40.6 10/11/2020     10/11/2020    MCV 86.4 10/11/2020    MCH 28.1 10/11/2020    MCHC 32.5 10/11/2020    RDW 13.3 10/11/2020    SEGSPCT 79.9 10/11/2020    BANDSPCT 14 10/06/2020    LYMPHOPCT 9.4 10/11/2020    MONOPCT 7.8 10/11/2020    MYELOPCT 2 10/06/2020    BASOPCT 0.4 10/11/2020    MONOSABS 0.9 10/11/2020    LYMPHSABS 1.1 10/11/2020    EOSABS 0.2 10/11/2020    BASOSABS 0.0 10/11/2020    DIFFTYPE AUTOMATED DIFFERENTIAL 10/11/2020     CMP:    Lab Results   Component Value Date     10/11/2020    K 3.5 10/11/2020     10/11/2020    CO2 28 10/11/2020    BUN 6 10/11/2020    CREATININE 0.6 10/11/2020    GFRAA >60 10/11/2020    LABGLOM >60 10/11/2020    GLUCOSE 249 10/11/2020    PROT 7.4 10/06/2020    LABALBU 3.8 10/06/2020    CALCIUM 8.7 10/11/2020    BILITOT 0.4 10/06/2020    ALKPHOS 135 10/06/2020    AST 13 10/06/2020    ALT 15 10/06/2020     Hepatic Function Panel:    Lab Results   Component Value Date    ALKPHOS 135 10/06/2020    ALT 15 10/06/2020    AST 13 10/06/2020    PROT 7.4 10/06/2020    BILITOT 0.4 10/06/2020    LABALBU 3.8 10/06/2020     Magnesium:    Lab Results   Component Value Date    MG 1.8 10/11/2020     PT/INR:    Lab Results   Component Value Date    PROTIME 12.5 10/07/2020    INR 1.03 10/07/2020     Last 3 Troponin:  No results found for: TROPONINI  U/A:    Lab Results   Component Value Date    COLORU YELLOW 10/06/2020    WBCUA <1 10/06/2020    RBCUA <1 10/06/2020    MUCUS RARE 10/06/2020    TRICHOMONAS NONE SEEN 10/06/2020    BACTERIA NEGATIVE 10/06/2020    CLARITYU CLEAR 10/06/2020    SPECGRAV 1.021 10/06/2020    LEUKOCYTESUR NEGATIVE 10/06/2020    UROBILINOGEN NORMAL 10/06/2020    BILIRUBINUR NEGATIVE 10/06/2020    BLOODU MODERATE 10/06/2020     ABG:    Lab Results   Component Value Date    JLO7HWC 38.0 10/07/2020    PO2ART 51 10/07/2020    GEQ9LHS 22.0 10/07/2020     FLP:  No results found for: TRIG, HDL, LDLCALC, LDLDIRECT, LABVLDL  TSH:  No results found for: TSH   DATA:   ECG: Sinus Rhythm       ASSESSMENT:   1 DKA improved and resolved  2 BiV with occasional PVCs  Keep on amiodarone and Lopressor  3 morbid obesity (caloric intake  4 hypertension blood sugar control  5 sepsis  PLAN   Stable from cardiology standpoint
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
## 2020-10-12 NOTE — CARE COORDINATION
CM reviwed previous CM notes and placed a white board for PT/OT to see for poss SNF placement.   0900 CM received call from 11 [facility]. Dtr plans to come in 2 weeks to move pt back to [place] with her family. CM explained to her that CM was awaiting notes for PT/OT to determine needs for pt when discharged. [facility]  1205 CM reviewed notes. Pt recommendation is for SNF. CM called to discuss with Anu in ARU.     1300 CM called Delbert Lowe in Swing bed to check on the availability. CM will await her call. [facility]  1345 CM called Delbert Lowe in [address] [facility] bed pt determination is pending Hospitalist review. 1435 CM received call from Delbert Lowe. Pt is approved and may go whenever medically cleared. Pt will need a Rapid Covid. CM discussed with nursing and informed that pt may not go to Swing Bed without a NEG Covid. CM transport time is 845 per med trans.  CM notified facility [facility]

## 2020-10-12 NOTE — CARE COORDINATION
Received referral for ARU. Reviewed patients clinicals and PT/OT notes. Patient not ARU appropriate. Left Vm for Tamara RAMOS.

## 2020-10-12 NOTE — DISCHARGE SUMMARY
Discharge Summary    Name:  Marshall Perez /Age/Sex: 1950  (78 y.o. male)   MRN & CSN:  [id number] & [id number] Admission Date/Time: 10/6/2020  3:23 PM   Attending:  Allyson Goodpasture, MD Discharging Physician: Pablo Sifuentes MD     Hospital Course:   Laura Celis a 78 y. o.  male  who presents with altered mental status.    1) DKA due to sepsis and  Noncompliance  -Received IV NS for rehydration  -Reecieved insulin drip based on DKA Protocol  -Endo on board; resumed Sc insulin     2) Sepsis 2/2 concerning for viral meningitis  -CT Chest and A/P: Non acute  -Also concerning for dental Caries  -CRP and Procal elevated but trending down  -LP showed mildly elevated WBC; with monocytosis  -BC: NGTD and CSF HSV was negative. [place] Ab pending  -ID on board; on IV Unasyn to end 10/13/2020. Patient was also on IV Acyclovir which was discontinued due to negative HSV PCR. No clinical signs of meningitis at this point.    3) Acute Metabolic Encephalopathy  -Improved  -CT Head: Non acute  -Continue to monitor     4) Electrolyte imbalance due refeeding syndrome  -Hypokalemia, Hypophosphatemia, Hypomagnesemia  -Monitor and replace as needed      5) Elevated troponin  -Had episodes of NSVT  -Cardiology on board; continue amiod and metoprolol     6) Incidental Pulmonary nodule RUL, 7 mm on CT chest  -Will need outpatient f/u in 6 - 12 months        Chronic medical conditions  - HTN, Continue home meds  - Hypothyroidism- Cont synthroid  -History of CHF, lasix on hold  -Left AKA     PT/OT on board; will dc to swing bed    The patient expressed appropriate understanding of and agreement with the discharge recommendations, medications, and plan.      Consults this admission:  IP CONSULT TO HOSPITALIST  IP CONSULT TO ENDOCRINOLOGY  IP CONSULT TO CARDIOLOGY  IP CONSULT TO CARDIOLOGY  IP CONSULT TO INTERVENTIONAL RADIOLOGY  IP CONSULT TO INFECTIOUS DISEASES    Discharge Instruction:   Follow up appointments:   Primary care physician:  within 2 weeks    Diet:  diabetic diet   Activity: activity as tolerated  Disposition: Discharged to:   [x] Swing bed, []C, []SNF, []Acute Rehab, []Hospice   Condition on discharge: Stable    Discharge Medications:      Claudia Bonds   Home Medication Instructions     Printed on:10/12/20 9512   Medication Information                      furosemide (LASIX) 20 MG tablet  Take 20 mg by mouth daily             insulin 70-30 (NOVOLIN 70/30) (70-30) 100 UNIT per ML injection vial  Inject 30 Units into the skin 2 times daily             levothyroxine (SYNTHROID) 175 MCG tablet  Take 175 mcg by mouth Daily             losartan (COZAAR) 50 MG tablet  Take 50 mg by mouth daily             metoprolol succinate (TOPROL XL) 25 MG extended release tablet  Take 25 mg by mouth daily             pregabalin (LYRICA) 150 MG capsule  Take 150 mg by mouth 2 times daily. tiZANidine (ZANAFLEX) 4 MG tablet  Take 4 mg by mouth every 8 hours as needed                 Objective Findings at Discharge:   BP (!) 140/76   Pulse 80   Temp 98 °F (36.7 °C) (Oral)   Resp 16   Ht 5' 11\" (1.803 m)   Wt 255 lb 11.2 oz (116 kg)   SpO2 95%   BMI 35.66 kg/m²            PHYSICAL EXAM   GEN Awake male, sitting upright in bed in no apparent distress. Appears given age. EYES Pupils are equally round. No scleral erythema, discharge, or conjunctivitis. HENT Mucous membranes are moist. Oral pharynx without exudates, no evidence of thrush. NECK Supple, no apparent thyromegaly or masses. RESP Clear to auscultation, no wheezes, rales or rhonchi. Symmetric chest movement while on room air. CARDIO/VASC S1/S2 auscultated. Regular rate without appreciable murmurs, rubs, or gallops. No JVD or carotid bruits. Peripheral pulses equal bilaterally and palpable. No peripheral edema. GI Abdomen is soft without significant tenderness, masses, or guarding. Bowel sounds are normoactive. Rectal exam deferred.  No costovertebral angle tenderness. Normal appearing external genitalia. Lopez catheter is not present. HEME/LYMPH No palpable cervical lymphadenopathy and no hepatosplenomegaly. No petechiae or ecchymoses. MSK No gross joint deformities. SKIN Normal coloration, warm, dry. NEURO Cranial nerves appear grossly intact, normal speech, no lateralizing weakness. PSYCH Awake, alert, oriented x 4. Affect appropriate.     BMP/CBC  Recent Labs     10/10/20  0559 10/11/20  0455    137   K 3.2* 3.5    101   CO2 24 28   BUN 6 6   CREATININE 0.7* 0.6*   WBC 13.3* 11.3*   HCT 41.3* 40.6*    298       IMAGING:  As above    Discharge Time of 35 minutes    Electronically signed by Vanda Jalloh MD on 10/12/2020 at 3:43 PM

## 2020-10-12 NOTE — FLOWSHEET NOTE
In: 6446  Time Out: 1005  Minutes: 10    Visit number: [address], [address] [facility], 10/12/2020

## 2020-10-12 NOTE — PROGRESS NOTES
Infectious Disease Progress Note  10/12/2020   Patient Name: Zahida Roberts : 1950       Reason for visit: F/u metabolic encephalopathy, possible viral meningitis, sepsis, uncontrolled diabetes with DKA. ? History:? Interval history noted. HSV PCR negative  Denies n/v/d/f or untoward effects of antimicrobials  Stool  Physical Exam:  Vital Signs: /66   Pulse 80   Temp 98.1 °F (36.7 °C) (Oral)   Resp 16   Ht 5' 11\" (1.803 m)   Wt 255 lb 11.2 oz (116 kg)   SpO2 95%   BMI 35.66 kg/m²     Gen: awake and alert  Skin: no stigmata of endocarditis  Wounds: C/D/I  HEMT: AT/NC Oropharynx pink, moist, and without lesions or exudates; dentition in good state of repair  Eyes: PERRLA, EOMI, conjunctiva pink, sclera anicteric. Neck: Supple. Trachea midline. No LAD. Chest: no distress and CTA. Good air movement. Heart: RRR and no MRG. Abd: soft, non-distended, no tenderness, no hepatomegaly. Normoactive bowel sounds. Ext: left AKA, nil pedal edema  Catheter Site: without erythema or tenderness  Neuro: CN 2-12 intact and no focal sensory or motor deficits     Radiologic / Imaging / TESTING  No results found.      Labs:    Recent Results (from the past 24 hour(s))   POCT Glucose    Collection Time: 10/11/20  8:41 AM   Result Value Ref Range    POC Glucose 184 (H) 70 - 99 MG/DL   POCT Glucose    Collection Time: 10/11/20 11:48 AM   Result Value Ref Range    POC Glucose 200 (H) 70 - 99 MG/DL   POCT Glucose    Collection Time: 10/11/20  5:07 PM   Result Value Ref Range    POC Glucose 118 (H) 70 - 99 MG/DL   POCT Glucose    Collection Time: 10/11/20  7:52 PM   Result Value Ref Range    POC Glucose 145 (H) 70 - 99 MG/DL   POCT Glucose    Collection Time: 10/12/20  1:38 AM   Result Value Ref Range    POC Glucose 138 (H) 70 - 99 MG/DL     CULTURE results: Invalid input(s): BLOOD CULTURE,  URINE CULTURE, SURGICAL CULTURE    Diagnosis:  Patient Active Problem List   Diagnosis    DKA, type 2, not at goal [facility])  Sepsis with encephalopathy without septic shock ([facility] [address]       Active Problems  Active Problems:    DKA, type 2, not at goal [facility])    Seps[facility]opathy [facility]  Resolved Problems:    * No resolved hospital problems. *      Impression and plan   Clinical status: Improving, afebrile for >72 hours . CRP is elevated, on a downward trnd. Complains of stool frequency. May be having bit of antibiotic associated diarrhea or osmotic diarrhea from hypoglycemia.  Therapeutic: Continue Unasyn, end date: 10/13/2020. Start probiotics.   Discontinue acyclovir (10/7-10/9)   Diagnostic: Trend CRP and procalcitonin   F/u:    Other:   Summary:      Electronically signed by: Electronically signed by Melene Olszewski, MD on 10/12/2020 at 7:32 AM

## 2020-10-13 LAB
ANION GAP SERPL CALCULATED.3IONS-SCNC: 13 MMOL/L (ref 4–16)
BASOPHILS ABSOLUTE: 0.1 K/CU MM
BASOPHILS RELATIVE PERCENT: 0.7 % (ref 0–1)
BUN BLDV-MCNC: 5 MG/DL (ref 6–23)
CALCIUM SERPL-MCNC: 9.1 MG/DL (ref 8.3–10.6)
CHLORIDE BLD-SCNC: 98 MMOL/L (ref 99–110)
CO2: 25 MMOL/L (ref 21–32)
CREAT SERPL-MCNC: 0.7 MG/DL (ref 0.9–1.3)
DIFFERENTIAL TYPE: ABNORMAL
EOSINOPHILS ABSOLUTE: 0.3 K/CU MM
EOSINOPHILS RELATIVE PERCENT: 2.9 % (ref 0–3)
GFR AFRICAN AMERICAN: >60 ML/MIN/1.73M2
GFR NON-AFRICAN AMERICAN: >60 ML/MIN/1.73M2
GLUCOSE BLD-MCNC: 160 MG/DL (ref 70–99)
GLUCOSE BLD-MCNC: 241 MG/DL (ref 70–99)
GLUCOSE BLD-MCNC: 245 MG/DL (ref 70–99)
GLUCOSE BLD-MCNC: 262 MG/DL (ref 70–99)
GLUCOSE BLD-MCNC: 313 MG/DL (ref 70–99)
GLUCOSE BLD-MCNC: 369 MG/DL (ref 70–99)
HCT VFR BLD CALC: 45.2 % (ref 42–52)
HEMOGLOBIN: 14.7 GM/DL (ref 13.5–18)
IMMATURE NEUTROPHIL %: 0.5 % (ref 0–0.43)
LYMPHOCYTES ABSOLUTE: 0.9 K/CU MM
LYMPHOCYTES RELATIVE PERCENT: 10.7 % (ref 24–44)
MCH RBC QN AUTO: 28.3 PG (ref 27–31)
MCHC RBC AUTO-ENTMCNC: 32.5 % (ref 32–36)
MCV RBC AUTO: 87.1 FL (ref 78–100)
MONOCYTES ABSOLUTE: 1.2 K/CU MM
MONOCYTES RELATIVE PERCENT: 13.4 % (ref 0–4)
PDW BLD-RTO: 13.5 % (ref 11.7–14.9)
PLATELET # BLD: 342 K/CU MM (ref 140–440)
PMV BLD AUTO: 10.2 FL (ref 7.5–11.1)
POTASSIUM SERPL-SCNC: 3.8 MMOL/L (ref 3.5–5.1)
RBC # BLD: 5.19 M/CU MM (ref 4.6–6.2)
SEGMENTED NEUTROPHILS ABSOLUTE COUNT: 6.2 K/CU MM
SEGMENTED NEUTROPHILS RELATIVE PERCENT: 71.8 % (ref 36–66)
SODIUM BLD-SCNC: 136 MMOL/L (ref 135–145)
TOTAL IMMATURE NEUTOROPHIL: 0.04 K/CU MM
WBC # BLD: 8.6 K/CU MM (ref 4–10.5)

## 2020-10-13 PROCEDURE — 94761 N-INVAS EAR/PLS OXIMETRY MLT: CPT

## 2020-10-13 PROCEDURE — 82962 GLUCOSE BLOOD TEST: CPT

## 2020-10-13 PROCEDURE — 97530 THERAPEUTIC ACTIVITIES: CPT

## 2020-10-13 PROCEDURE — 1200000002 HC SEMI PRIVATE SWING BED

## 2020-10-13 PROCEDURE — 90686 IIV4 VACC NO PRSV 0.5 ML IM: CPT | Performed by: INTERNAL MEDICINE

## 2020-10-13 PROCEDURE — 6370000000 HC RX 637 (ALT 250 FOR IP): Performed by: INTERNAL MEDICINE

## 2020-10-13 PROCEDURE — 97162 PT EVAL MOD COMPLEX 30 MIN: CPT

## 2020-10-13 PROCEDURE — 97166 OT EVAL MOD COMPLEX 45 MIN: CPT

## 2020-10-13 PROCEDURE — 6360000002 HC RX W HCPCS: Performed by: INTERNAL MEDICINE

## 2020-10-13 PROCEDURE — 36415 COLL VENOUS BLD VENIPUNCTURE: CPT

## 2020-10-13 PROCEDURE — 2580000003 HC RX 258: Performed by: INTERNAL MEDICINE

## 2020-10-13 PROCEDURE — 80048 BASIC METABOLIC PNL TOTAL CA: CPT

## 2020-10-13 PROCEDURE — G0008 ADMIN INFLUENZA VIRUS VAC: HCPCS | Performed by: INTERNAL MEDICINE

## 2020-10-13 PROCEDURE — 85025 COMPLETE CBC W/AUTO DIFF WBC: CPT

## 2020-10-13 RX ORDER — PREGABALIN 75 MG/1
150 CAPSULE ORAL 2 TIMES DAILY
Status: DISCONTINUED | OUTPATIENT
Start: 2020-10-13 | End: 2020-10-20 | Stop reason: HOSPADM

## 2020-10-13 RX ADMIN — LEVOTHYROXINE SODIUM 175 MCG: 0.15 TABLET ORAL at 06:44

## 2020-10-13 RX ADMIN — INSULIN GLARGINE 50 UNITS: 100 INJECTION, SOLUTION SUBCUTANEOUS at 20:41

## 2020-10-13 RX ADMIN — METOPROLOL SUCCINATE 50 MG: 50 TABLET, EXTENDED RELEASE ORAL at 09:20

## 2020-10-13 RX ADMIN — HEPARIN SODIUM 5000 UNITS: 5000 INJECTION INTRAVENOUS; SUBCUTANEOUS at 06:44

## 2020-10-13 RX ADMIN — AMPICILLIN SODIUM AND SULBACTAM SODIUM 3 G: 2; 1 INJECTION, POWDER, FOR SOLUTION INTRAMUSCULAR; INTRAVENOUS at 23:45

## 2020-10-13 RX ADMIN — POTASSIUM CHLORIDE 10 MEQ: 750 TABLET, EXTENDED RELEASE ORAL at 20:39

## 2020-10-13 RX ADMIN — PREGABALIN 150 MG: 75 CAPSULE ORAL at 20:39

## 2020-10-13 RX ADMIN — AMPICILLIN SODIUM AND SULBACTAM SODIUM 3 G: 2; 1 INJECTION, POWDER, FOR SOLUTION INTRAMUSCULAR; INTRAVENOUS at 14:22

## 2020-10-13 RX ADMIN — INFLUENZA A VIRUS A/VICTORIA/2454/2019 IVR-207 (H1N1) ANTIGEN (PROPIOLACTONE INACTIVATED), INFLUENZA A VIRUS A/HONG KONG/2671/2019 IVR-208 (H3N2) ANTIGEN (PROPIOLACTONE INACTIVATED), INFLUENZA B VIRUS B/VICTORIA/705/2018 BVR-11 ANTIGEN (PROPIOLACTONE INACTIVATED), INFLUENZA B VIRUS B/PHUKET/3073/2013 BVR-1B ANTIGEN (PROPIOLACTONE INACTIVATED) 0.5 ML: 15; 15; 15; 15 INJECTION, SUSPENSION INTRAMUSCULAR at 09:32

## 2020-10-13 RX ADMIN — LOSARTAN POTASSIUM 25 MG: 25 TABLET ORAL at 09:20

## 2020-10-13 RX ADMIN — AMIODARONE HYDROCHLORIDE 200 MG: 200 TABLET ORAL at 09:20

## 2020-10-13 RX ADMIN — PREGABALIN 150 MG: 75 CAPSULE ORAL at 14:22

## 2020-10-13 RX ADMIN — POTASSIUM CHLORIDE 10 MEQ: 750 TABLET, EXTENDED RELEASE ORAL at 09:20

## 2020-10-13 RX ADMIN — INSULIN LISPRO 15 UNITS: 100 INJECTION, SOLUTION INTRAVENOUS; SUBCUTANEOUS at 09:22

## 2020-10-13 RX ADMIN — INSULIN LISPRO 4 UNITS: 100 INJECTION, SOLUTION INTRAVENOUS; SUBCUTANEOUS at 09:20

## 2020-10-13 RX ADMIN — AMPICILLIN SODIUM AND SULBACTAM SODIUM 3 G: 2; 1 INJECTION, POWDER, FOR SOLUTION INTRAMUSCULAR; INTRAVENOUS at 06:46

## 2020-10-13 RX ADMIN — INSULIN LISPRO 15 UNITS: 100 INJECTION, SOLUTION INTRAVENOUS; SUBCUTANEOUS at 12:03

## 2020-10-13 RX ADMIN — AMPICILLIN SODIUM AND SULBACTAM SODIUM 3 G: 2; 1 INJECTION, POWDER, FOR SOLUTION INTRAMUSCULAR; INTRAVENOUS at 17:16

## 2020-10-13 RX ADMIN — PANTOPRAZOLE SODIUM 40 MG: 40 TABLET, DELAYED RELEASE ORAL at 06:45

## 2020-10-13 RX ADMIN — HEPARIN SODIUM 5000 UNITS: 5000 INJECTION INTRAVENOUS; SUBCUTANEOUS at 20:40

## 2020-10-13 RX ADMIN — INSULIN LISPRO 2 UNITS: 100 INJECTION, SOLUTION INTRAVENOUS; SUBCUTANEOUS at 17:17

## 2020-10-13 RX ADMIN — INSULIN LISPRO 8 UNITS: 100 INJECTION, SOLUTION INTRAVENOUS; SUBCUTANEOUS at 12:02

## 2020-10-13 RX ADMIN — ASPIRIN 81 MG 81 MG: 81 TABLET ORAL at 09:20

## 2020-10-13 RX ADMIN — INSULIN LISPRO 6 UNITS: 100 INJECTION, SOLUTION INTRAVENOUS; SUBCUTANEOUS at 20:40

## 2020-10-13 RX ADMIN — Medication 1 CAPSULE: at 09:20

## 2020-10-13 RX ADMIN — INSULIN LISPRO 4 UNITS: 100 INJECTION, SOLUTION INTRAVENOUS; SUBCUTANEOUS at 02:08

## 2020-10-13 RX ADMIN — INSULIN LISPRO 15 UNITS: 100 INJECTION, SOLUTION INTRAVENOUS; SUBCUTANEOUS at 17:27

## 2020-10-13 RX ADMIN — HEPARIN SODIUM 5000 UNITS: 5000 INJECTION INTRAVENOUS; SUBCUTANEOUS at 14:22

## 2020-10-13 ASSESSMENT — PAIN SCALES - GENERAL
PAINLEVEL_OUTOF10: 0

## 2020-10-13 NOTE — PROGRESS NOTES
Physical Therapy    Facility/Department: [facility] UNIT  Initial Assessment    NAME: Temo Godwin  : 1950  MRN: [id number]    Date of Service: 10/13/2020    Discharge Recommendations:  Continue to assess pending progress, Home with Home health PT   PT Equipment Recommendations  Equipment Needed: No    Assessment   Body structures, Functions, Activity limitations: Decreased functional mobility ; Decreased ADL status; Decreased balance;Decreased posture;Decreased strength;Decreased safe awareness;Decreased endurance  Assessment: P is 79 yo male who presents s/p hospitalization with DKA and significant deficits in strength, balance, mobility and overall reduced independence. P was previously Ping and now requires assist for all mobility. Recommend skilled PT to address deficits and maximize functional potential.  Treatment Diagnosis: impaired balance, strength, mobility  Prognosis: Good  Decision Making: Medium Complexity  History: see below  Exam: assessed balance, strength, mobility  Clinical Presentation: evolving  PT Education: PT Role;Goals;Plan of Care  Barriers to Learning: none  REQUIRES PT FOLLOW UP: Yes  Activity Tolerance  Activity Tolerance: Patient Tolerated treatment well       Patient Diagnosis(es): There were no encounter diagnoses. has a past medical history of Diabetes mellitus ([facility] [address] has a past surgical history that includes pacemaker placement and above knee amputation (Left).     Restrictions  Restrictions/Precautions  Restrictions/Precautions: General Precautions, Fall Risk  Required Braces or Orthoses?: Yes  Required Braces or Orthoses  Left Lower Extremity Brace: (prosthesis)  Position Activity Restriction  Other position/activity restrictions: L AKA since , has prosthesis  Vision/Hearing  Vision: Within Functional Limits(hx of cataract surgery)  Hearing: Within functional limits     Subjective  General  Chart Reviewed: Yes  Patient assessed for rehabilitation services?: Yes  Family / Caregiver Present: No  Follows Commands: Within Functional Limits  Other (Comment): impulsive  Subjective  Subjective: \"Can you move the bed closer? \"  Pain Screening  Patient Currently in Pain: Denies  Vital Signs  Patient Currently in Pain: Denies       Orientation  Orientation  Overall Orientation Status: Within Functional Limits  Social/Functional History  Social/Functional History  Lives With: Alone  Type of Home: House(\"half a double\")  Home Layout: One level  Home Access: Ramped entrance  Bathroom Shower/Tub: Walk-in shower  Bathroom Toilet: Handicap height  Bathroom Equipment: Grab bars in shower  Bathroom Accessibility: Wheelchair accessible  Home Equipment: Rolling walker, 4 wheeled walker, Wheelchair-electric, Quad cane  ADL Assistance: Independent  Homemaking Assistance: Independent  Homemaking Responsibilities: Yes  Meal Prep Responsibility: Primary  Ambulation Assistance: (primarily uses power wc, quad cane if prosthesis donned)  Transfer Assistance: Needs assistance(pivots to power chair but can use a walker for short distance)  Occupation: Retired  Type of occupation: worked on [employer]  Cognition   Cognition  Overall Cognitive Status: Exceptions  Following Commands:  Follows all commands without difficulty  Attention Span: Attends with cues to redirect  Memory: Appears intact  Safety Judgement: Decreased awareness of need for assistance;Decreased awareness of need for safety  Problem Solving: Assistance required to identify errors made  Insights: Decreased awareness of deficits  Initiation: Requires cues for some  Sequencing: Requires cues for some    Objective     Observation/Palpation  Observation: Pt was on the toilet when OT/PT entered the room    PROM RLE (degrees)  RLE PROM: WFL  AROM RLE (degrees)  RLE AROM: WFL  AROM LLE (degrees)  LLE General AROM: AKA, WFL at hip  Strength RLE  Strength RLE: Exception  R Hip Flexion: 4-/5  R Knee Extension: 4/5  R Ankle Dorsiflexion: 4/5  Strength LLE  Strength LLE: Exception  L Hip Flexion: 4+/5  Tone RLE  RLE Tone: Normotonic  Motor Control  Gross Motor?: Exceptions  Comments: repetition hip flexor/quad movement on LLE at rest  Sensation  Overall Sensation Status: (not formally assessed)  Bed mobility  Sit to Supine: Supervision  Transfers  Sit to Stand: 2 Person Assistance;Minimal Assistance;Contact guard assistance  Stand to sit: Minimal Assistance;Contact guard assistance  Bed to Chair: Minimal assistance  Comment: P requires repetition and cues for safety. stands from toilet with min A with no prosthesis, stand pivot to bed with min A with decreased safety. Sit <>stand from bed with CGA  x 2 and BUE support  Ambulation  Ambulation?: No  WB Status: full weight bearing  Stairs/Curb  Stairs?: No     Balance  Sitting - Static: Good  Sitting - Dynamic: Good  Standing - Static: Fair  Standing - Dynamic: Poor        Plan   Plan  Times per week: 5+  Plan weeks: 2 weeks or until discharge  Current Treatment Recommendations: Strengthening, ROM, Balance Training, Neuromuscular Re-education, Home Exercise Program, Safety Education & Training, Functional Mobility Training, Transfer Training, Gait Training, Positioning, Equipment Evaluation, Education, & procurement, Wheelchair Mobility Training, Endurance Training, Patient/Caregiver Education & Training  Safety Devices  Type of devices: Call light within reach, Bed alarm in place, Left in bed  Restraints  Initially in place: No    AM-PAC Score  AM-PAC Inpatient Mobility Raw Score : 14 (10/13/20 1716)  AM-PAC Inpatient T-Scale Score : 38.1 (10/13/20 1716)  Mobility Inpatient CMS 0-100% Score: 61.29 (10/13/20 1716)  Mobility Inpatient CMS G-Code Modifier : CL (10/13/20 1716)          Goals  Short term goals  Time Frame for Short term goals: 2 weeks  Short term goal 1: Pt will complete bed mobility with modified independence.   Short term goal 2: Pt will complete squat-pivot transfers with modified independence. Short term goal 3: Pt will be independent with LE HEP to maximize functional mobility and safety. Short term goal 4: P will ambulate x 10' with L prosthesis donned, standard walker and Ping. Short term goal 5: P will stand with Ping x 2 min with prosthesis and standard walker.   Patient Goals   Patient goals : to move in with daughter       Therapy Time   Individual Concurrent Group Co-treatment   Time In       1507   Time Out       1534   Minutes       27   Timed Code Treatment Minutes: 101[address] [facility] [place]    Electronically signed by Zachery Dougherty PT on 10/13/2020 at 5:16 PM

## 2020-10-13 NOTE — PROGRESS NOTES
Home med list reconciled with the patient and against the discharge instructions sent from [facility]. Patient states that he has not been receiving his Lyrica at [facility] and he wants it restarted here. Informed the patient that this nurse would ask the Hospitalist about his Lyrica. Skin assessment was performed by myself and [facility] RN, scattered bruising was noted but no other abnormalities.

## 2020-10-13 NOTE — PROGRESS NOTES
Occupational Therapy   Occupational Therapy Initial Assessment  Date: 10/13/2020   Patient Name: Lashay Mandel  MRN: [id number]     : 1950    Date of Service: 10/13/2020    Discharge Recommendations:  Home with Home health OT, Home with nursing aide, 24 hour supervision or assist       Assessment   Performance deficits / Impairments: Decreased functional mobility ; Decreased endurance;Decreased ADL status; Decreased strength;Decreased safe awareness;Decreased balance;Decreased posture  Assessment: 78 yr old male admitted to swing bed with weakness from DKA. He presents with decreased I adls, transfers and endurance. He is impulsive with transfers. OT to see pt to maximize I with adls, transfers, improve endurance and safety  Treatment Diagnosis: DKA, hx of L AKA  Prognosis: Good  Decision Making: Medium Complexity  History: see above  Exam: see above  OT Education: OT Role;Transfer Training  REQUIRES OT FOLLOW UP: Yes  Activity Tolerance  Activity Tolerance: Patient Tolerated treatment well  Safety Devices  Safety Devices in place: Yes  Type of devices: Bed alarm in place;Call light within reach; Left in bed;Nurse notified           Patient Diagnosis(es): There were no encounter diagnoses. has a past medical history of Diabetes mellitus ([facility] [address] has a past surgical history that includes pacemaker placement and above knee amputation (Left).     Treatment Diagnosis: DKA, hx of L AKA      Restrictions  Restrictions/Precautions  Restrictions/Precautions: General Precautions, Fall Risk  Required Braces or Orthoses?: Yes  Required Braces or Orthoses  Left Lower Extremity Brace: (prosthesis)  Position Activity Restriction  Other position/activity restrictions: L AKA since , has prosthesis    Subjective   General  Chart Reviewed: Yes  Patient assessed for rehabilitation services?: Yes  Diagnosis: weakness  Patient Currently in Pain: Denies  Vital Signs  Patient Currently in Pain: Denies  Social/Functional History  Social/Functional History  Lives With: Alone  Type of Home: House(\"half a double\")  Home Layout: One level  Home Access: Ramped entrance  Bathroom Shower/Tub: Walk-in shower  Bathroom Toilet: Handicap height  Bathroom Equipment: Grab bars in shower  Bathroom Accessibility: Wheelchair accessible  Home Equipment: Rolling walker, 4 wheeled walker, Wheelchair-electric, Quad cane  ADL Assistance: [facility] [facility] [address]: Independent  Homemaking Responsibilities: Yes  Meal Prep Responsibility: Primary  Ambulation Assistance: (primarily uses power wc, quad cane if prosthesis donned)  Transfer Assistance: Needs assistance(pivots to power chair but can use a walker for short distance)  Occupation: Retired  Type of occupation: worked on [employer]       Objective   Vision: Within Functional Limits(had cataract surgery)  Hearing: Within functional limits    Orientation  Overall Orientation Status: Within Functional Limits  Observation/Palpation  Observation: Pt was on the toilet when OT/PT entered the room  Balance  Sitting Balance: Independent  Standing Balance: Minimal assistance  Functional Mobility  Functional - Mobility Device: No device(holding bed rail)  Activity: (to and from toilet)  Assist Level: Minimal assistance  Functional Mobility Comments: poor safety impulsive  Toilet Transfers  Toilet - Technique: Stand pivot  Equipment Used: Raised toilet seat with rails  Toilet Transfer: Minimal assistance  ADL  Feeding: Setup  UE Bathing: Stand by assistance  LE Bathing: Maximum assistance(unable to wipe or reach R leg to don sock)  LE Dressing: Maximum assistance(unable to don prosthesis, unable to reach R foot to don sock, unable to don or pull up pull up)  Toileting: Dependent/Total  Tone RUE  RUE Tone: Normotonic  Tone LUE  LUE Tone: Normotonic  Coordination  Movements Are Fluid And Coordinated: Yes     Bed mobility  Sit to Supine: Supervision  Transfers  Sit to stand: Minimal assistance  Stand to sit: Minimal assistance     Cognition  Overall Cognitive Status: Exceptions  Following Commands: Follows all commands without difficulty  Attention Span: Attends with cues to redirect  Memory: Appears intact  Safety Judgement: Decreased awareness of need for assistance;Decreased awareness of need for safety  Problem Solving: Assistance required to generate solutions;Assistance required to implement solutions;Decreased awareness of errors;Assistance required to identify errors made;Assistance required to correct errors made  Insights: Decreased awareness of deficits  Initiation: Requires cues for some  Sequencing: Requires cues for some        Sensation  Overall Sensation Status: (not formally assessed)        LUE AROM (degrees)  LUE AROM : WFL  RUE AROM (degrees)  RUE AROM : WFL  LUE Strength  Gross LUE Strength: WFL  RUE Strength  Gross RUE Strength: WFL                   Plan   Plan  Times per week: 4+  Current Treatment Recommendations: Strengthening, Balance Training, Functional Mobility Training, Endurance Training, Self-Care / ADL, Safety Education & Training, Patient/Caregiver Education & Training    G-Code     OutComes Score                                                  AM-PAC Score    AM-PAC 6 click short form for inpatient daily activity:   How much help from another person does the patient currently need. .. Unable  Dep A Lot  Max A A Lot   Mod A A Little  Min A A Little   CGA  SBA None   Mod I  Indep  Sup   1. Putting on and taking off regular lower body clothing? [x] 1    [] 2   [] 2   [] 3   [] 3   [] 4      2. Bathing (including washing, rinsing, drying)? [] 1   [x] 2   [] 2 [] 3 [] 3 [] 4   3. Toileting, which includes using toilet, bedpan, or urinal? [x] 1    [] 2   [] 2   [] 3   [] 3   [] 4     4. Putting on and taking off regular upper body clothing? [] 1   [] 2   [] 2   [x] 3   [] 3    [] 4      5. Taking care of personal grooming such as brushing teeth?  [] 1   [] 2    [] 2 [] 3    [x] 3   [] 4      6. Eating meals?    [] 1   [] 2   [] 2   [] 3   [] 3   [x] 4      Raw Score:  14/24 40-59% impaired     [24=0% impaired(CH), 23=1-19%(CI), 20-22=20-39%(CJ), 15-19=40-59%(CK), 10-14=60-79%(CL), 7-9=80-99%(CM), 6=100%(CN)]            Goals  Short term goals  Time Frame for Short term goals: until discharge  Short term goal 1: Pt will be MI for functional adl transfers to chair, toilet or bed w/o LOB or falls  Short term goal 2: Pt will be MI for UB bathing/dressing including donning his prosthesis  Short term goal 3: Pt will be MI for toileting  Short term goal 4: Pt will be min vc for BUE strengthening to assist with transfers and improve endurance  Patient Goals   Patient goals : Hopes to move to [place] with daughter       Therapy Time   Individual Concurrent Group Co-treatment   Time In       1507   Time Out       1534   Minutes       27   Timed Code Treatment Minutes: 20 [facility], OT

## 2020-10-13 NOTE — PROGRESS NOTES
RT assisted patient with s/u of his own cpap machine at the bedside. Patient has his unit from home and uses cpap on room air. Patient not requiring and supplemental 02 at this time. Patient is able to place his own mask and relates he is comfortable with no further needs at this time. RT notes good flow from cpap mask and Sp02 95% on cpap with room air. Patient call light within reach and Talbot Runner RN aware.

## 2020-10-13 NOTE — PLAN OF CARE
Problem: Falls - Risk of:  Goal: Will remain free from falls  Description: Will remain free from falls  Outcome: Ongoing  Goal: Absence of physical injury  Description: Absence of physical injury  Outcome: Ongoing     Problem:  Activity:  Goal: Ability to tolerate increased activity will improve  Description: Ability to tolerate increased activity will improve  Outcome: Ongoing     Problem: Discharge Planning:  Goal: Discharged to appropriate level of care  Description: Discharged to appropriate level of care  Outcome: Ongoing

## 2020-10-13 NOTE — H&P
History and Physical      Name:  Karla Madden /Age/Sex: 1950  (78 y.o. male)   MRN & CSN:  [id number] & [id number] Admission Date/Time: 10/12/2020  9:41 PM   Location:   PCP: Fernanda  Day: 2    Assessment and Plan:     1. Weakness: After prolonged hospital stay. Patient does have left BKA secondary to previous amputation with complications with diabetes. Patient to work with physical therapy and Occupational Therapy to increase strength and improve ADLs. 2.  Concern for viral meningitis: Patient previously with WBC count 11.2. Pending repeat. Continue Unasyn through today discontinue same. Afebrile. 3.  Insulin-dependent diabetes: No hemoglobin A1c on record will order same. Patient currently on basal insulin 50 units and moderate dose sliding scale at this time. This a.m. 245 POC. Uncontrolled. Will have diabetic educator. Continue diet carb control. Will initiate metformin. Continue to monitor    4. History of SVT: Cardiology evaluated patient recent hospitalization. Patient currently on amiodarone and metoprolol succinate. Continue same. History of pacemaker    5. Hypothyroidism: No overt signs continue levothyroxine. Diet DIET CARB CONTROL;   DVT Prophylaxis [] Lovenox, [x]  Heparin, [] SCDs, [] No VTE prophylaxis, patient ambulating   GI Prophylaxis [] PPI, [] H2 Blocker, [] No GI prophylaxis, patient is receiving diet/Tube Feeds   Code Status Full Code   Disposition Patient requires continued admission due to swing bed   MDM [] Low, [x] Moderate,[]  High  Patient's risk as above due to recent DKA poor glucose control continued IV antibiotics, PT OT     History of Present Illness:     Chief Complaint: Debility and weakness  Karla Madden is a 78 y.o.  male  who presents to swing bed for continued physical therapy and Occupational Therapy.   Patient was admitted to [facility] in DKA also with metabolic encephalopathy thought to be viral meningitis. Patient continues on Unasyn for additional 5 doses to complete previous therapy during admission. Patient alert and oriented. Denies any headache blurred vision dizziness. Denies any chest pain palpitation shortness of breath. Denies any fever chills nausea or vomiting. Denies any abdominal pain dysuria or diarrhea. Patient admits not taking insulin properly prior to admission. On good regimen at this time. Plan to work with physical therapy going forward has good attitude. Also, daughter traveling from [place] to be with patient plans to take patient back to [place] once stable. 10-14 point ROS reviewed negative, unless as noted above    Objective: Intake/Output Summary (Last 24 hours) at 10/13/2020 1210  Last data filed at 10/13/2020 0918  Gross per 24 hour   Intake 660 ml   Output 700 ml   Net -40 ml      Vitals:   Vitals:    10/13/20 0826   BP:    Pulse:    Resp:    Temp:    SpO2: 98%     Physical Exam:    GEN Awake male, sitting upright in bed in no apparent distress. Appears given age. EYES Pupils are equally round. No scleral erythema, discharge, or conjunctivitis. HENT Mucous membranes are moist.   NECK No apparent thyromegaly or masses. RESP Clear to auscultation but coarse, no wheezes, rales or rhonchi. Symmetric chest movement while on room air. CARDIO/VASC S1/S2 auscultated. Regular rate without appreciable murmurs, rubs, or gallops. Peripheral pulses equal bilaterally and palpable. No peripheral edema. GI Abdomen is soft without significant tenderness, masses, or guarding. Bowel sounds are normoactive. Rectal exam deferred.  Lopez catheter is not present. HEME/LYMPH No petechiae or ecchymoses. MSK No gross joint deformities. Spontaneous movement of all extremities. Left BKA  SKIN Normal coloration, warm, dry. NEURO Cranial nerves appear grossly intact, normal speech, no lateralizing weakness. PSYCH Awake, alert, oriented x 4. Affect appropriate. Past Medical History:      Past Medical History:   Diagnosis Date    Diabetes mellitus ([facility] [address]      PSHX:  has a past surgical history that includes pacemaker placement and above knee amputation (Left). Allergies: No Known Allergies    FAM HX: family history is not on file. Soc HX:   Social History     Socioeconomic History    Marital status:      Spouse name: Not on file    Number of children: Not on file    Years of education: Not on file    Highest education level: Not on file   Occupational History    Not on file   Social Needs    Financial resource strain: Not on file    Food insecurity     Worry: Not on file     Inability: Not on file    Transportation needs     Medical: Not on file     Non-medical: Not on file   Tobacco Use    Smoking status: Current Every Day Smoker    Smokeless tobacco: Current User   Substance and Sexual Activity    Alcohol use:  Yes    Drug use: Not on file    Sexual activity: Not on file   Lifestyle    Physical activity     Days per week: Not on file     Minutes per session: Not on file    Stress: Not on file   Relationships    Social connections     Talks on phone: Not on file     Gets together: Not on file     Attends Christian service: Not on file     Active member of club or organization: Not on file     Attends meetings of clubs or organizations: Not on file     Relationship status: Not on file    Intimate partner violence     Fear of current or ex partner: Not on file     Emotionally abused: Not on file     Physically abused: Not on file     Forced sexual activity: Not on file   Other Topics Concern    Not on file   Social History Narrative    Not on file       Medications:   Medications:    heparin (porcine)  5,000 Units Subcutaneous 3 times per day    amiodarone  200 mg Oral Daily    ampicillin-sulbactam  3 g Intravenous Q6H    aspirin  81 mg Oral Daily    insulin glargine  50 Units Subcutaneous Nightly    insulin lispro 0-12 Units Subcutaneous 2 times per day    insulin lispro  0-12 Units Subcutaneous TID WC    insulin lispro  15 Units Subcutaneous TID WC    lactobacillus  1 capsule Oral Daily with breakfast    levothyroxine  175 mcg Oral Daily    losartan  25 mg Oral Daily    metoprolol succinate  50 mg Oral Daily    pantoprazole  40 mg Oral QAM AC    potassium chloride  10 mEq Oral BID      Infusions:    dextrose 5 % and 0.45 % NaCl      dextrose       PRN Meds: dextrose 5 % and 0.45 % NaCl, , Continuous PRN  dextrose, 12.5 g, PRN  magnesium sulfate, 1 g, PRN  potassium chloride, 10 mEq, PRN  sodium phosphate IVPB, 10 mmol, PRN    Or  sodium phosphate IVPB, 15 mmol, PRN    Or  sodium phosphate IVPB, 20 mmol, PRN  dextrose, 100 mL/hr, PRN  glucagon (rDNA), 1 mg, PRN  glucose, 15 g, PRN          Electronically signed by Michelle Isaacs MD on 10/13/2020 at 12:10 PM

## 2020-10-13 NOTE — PLAN OF CARE
Patient states plan for discharge is to move in with daughter in [place].    Problem: Discharge Planning:  Goal: Discharged to appropriate level of care  Description: Discharged to appropriate level of care  Outcome: Ongoing

## 2020-10-13 NOTE — PATIENT CARE CONFERENCE
SWING BED WEEKLY TEAM ADRIAN Olvera   10/13/2020   WEEK # 1    Occupational Therapy    Feeding  [x] Independ [] SBA [] MIN assist  [] mod assist  [] max assist [] tot assist  Grooming [] Independ [x] SBA [] MIN assist  [] mod assist  [] max assist [] tot assist   Bathing upper body [] Independ [x] SBA [] MIN assist  [] mod assist  [] max assist              [] tot assist    Dressing upper body [] Independ [] SBA [] MIN assist  [] mod assist  [] max assist              [] tot assist not assessed yet    Dressing lower body [] Independ [] SBA [] MIN assist  [] mod assist  [] max assist              [x] tot assist   Toileting [] Independ [] SBA [] MIN assist  [] mod assist  [] max assist [x] tot assist    Home Management: x    Cognitive/Perception:impulsive    Upper extremity motor control:WFL    Other x  Goals of previous week:   [] 1st team   [] met   [] partially met    [x] not met             Why goals were not met just evaluated     Issues to be resolved before discharge:    Improved I with transfers, improved I adls  Occupational Therapy Signature: Agustín Catalan, OT

## 2020-10-13 NOTE — PATIENT CARE CONFERENCE
SWING BED WEEKLY TEAM ADRIAN Reyes   10/13/2020             WEEK# 1    PHYSICAL THERAPY       Ambulation: Distance:  0'    Device:  Standard walker   Assist: n/a      Wt bearing:  full    W/C skills: not performed       Pain:  denies   Transfers:   Sit to stand: min A   Stand to sit:      Min A         Bed Mobility:  Rolls right:     Rolls left:     Positioning:     Supine to sit:       Sit to supine: supervision         Strength/ROM: full ROM, 4/5 strength     Balance:     Static sitting    [] P  [] F-   [] F    [] F+    [x] G               Dynamic Sitting           [] P  [] F-   [] F    [] F+    [x] G                     Static standing            [] P  [] F-   [x] F    [] F+    [] G   [x]  With  [] Without device Dynamic standing       [x] P  [] F-   [] F    [] F+    [] G   [x]  With  [] Without device  (P= poor   F= fair   G= good)    Issues to be resolved before discharge weakness, impaired mobility    Goals of previous week  [x] 1st team   [] met   [] partially met    [] not met                                          Why the goals were not met 1st day    Goals:   Time Frame for Short term goals: 2 weeks  Short term goal 1: Pt will complete bed mobility with modified independence. Short term goal 2: Pt will complete squat-pivot transfers with modified independence. Short term goal 3: Pt will be independent with LE HEP to maximize functional mobility and safety. Short term goal 4: P will ambulate x 10' with L prosthesis donned, standard walker and Ping. Short term goal 5: P will stand with Ping x 2 min with prosthesis and standard walker.          Physical Therapy Signature:  Yovani Colindres PT

## 2020-10-14 PROBLEM — R53.1 WEAKNESS: Status: ACTIVE | Noted: 2020-10-14

## 2020-10-14 LAB
GLUCOSE BLD-MCNC: 184 MG/DL (ref 70–99)
GLUCOSE BLD-MCNC: 199 MG/DL (ref 70–99)
GLUCOSE BLD-MCNC: 228 MG/DL (ref 70–99)
GLUCOSE BLD-MCNC: 262 MG/DL (ref 70–99)
GLUCOSE BLD-MCNC: 319 MG/DL (ref 70–99)
GLUCOSE BLD-MCNC: 320 MG/DL (ref 70–99)

## 2020-10-14 PROCEDURE — 97530 THERAPEUTIC ACTIVITIES: CPT

## 2020-10-14 PROCEDURE — 6370000000 HC RX 637 (ALT 250 FOR IP): Performed by: INTERNAL MEDICINE

## 2020-10-14 PROCEDURE — 1200000002 HC SEMI PRIVATE SWING BED

## 2020-10-14 PROCEDURE — 82962 GLUCOSE BLOOD TEST: CPT

## 2020-10-14 PROCEDURE — 97110 THERAPEUTIC EXERCISES: CPT

## 2020-10-14 PROCEDURE — 97116 GAIT TRAINING THERAPY: CPT

## 2020-10-14 PROCEDURE — 6360000002 HC RX W HCPCS: Performed by: INTERNAL MEDICINE

## 2020-10-14 RX ORDER — POTASSIUM CHLORIDE 20 MEQ/1
40 TABLET, EXTENDED RELEASE ORAL ONCE
Status: COMPLETED | OUTPATIENT
Start: 2020-10-14 | End: 2020-10-14

## 2020-10-14 RX ORDER — GLIMEPIRIDE 4 MG/1
4 TABLET ORAL
Status: DISCONTINUED | OUTPATIENT
Start: 2020-10-15 | End: 2020-10-20 | Stop reason: HOSPADM

## 2020-10-14 RX ADMIN — AMIODARONE HYDROCHLORIDE 200 MG: 200 TABLET ORAL at 08:32

## 2020-10-14 RX ADMIN — ASPIRIN 81 MG 81 MG: 81 TABLET ORAL at 08:31

## 2020-10-14 RX ADMIN — INSULIN LISPRO 2 UNITS: 100 INJECTION, SOLUTION INTRAVENOUS; SUBCUTANEOUS at 17:14

## 2020-10-14 RX ADMIN — INSULIN LISPRO 15 UNITS: 100 INJECTION, SOLUTION INTRAVENOUS; SUBCUTANEOUS at 12:55

## 2020-10-14 RX ADMIN — INSULIN LISPRO 4 UNITS: 100 INJECTION, SOLUTION INTRAVENOUS; SUBCUTANEOUS at 08:38

## 2020-10-14 RX ADMIN — POTASSIUM CHLORIDE 40 MEQ: 20 TABLET, EXTENDED RELEASE ORAL at 17:23

## 2020-10-14 RX ADMIN — HEPARIN SODIUM 5000 UNITS: 5000 INJECTION INTRAVENOUS; SUBCUTANEOUS at 14:28

## 2020-10-14 RX ADMIN — INSULIN LISPRO 15 UNITS: 100 INJECTION, SOLUTION INTRAVENOUS; SUBCUTANEOUS at 08:34

## 2020-10-14 RX ADMIN — INSULIN LISPRO 6 UNITS: 100 INJECTION, SOLUTION INTRAVENOUS; SUBCUTANEOUS at 01:58

## 2020-10-14 RX ADMIN — LOSARTAN POTASSIUM 25 MG: 25 TABLET ORAL at 08:31

## 2020-10-14 RX ADMIN — POTASSIUM CHLORIDE 10 MEQ: 750 TABLET, EXTENDED RELEASE ORAL at 19:52

## 2020-10-14 RX ADMIN — METOPROLOL SUCCINATE 50 MG: 50 TABLET, EXTENDED RELEASE ORAL at 08:31

## 2020-10-14 RX ADMIN — Medication 1 CAPSULE: at 08:31

## 2020-10-14 RX ADMIN — INSULIN LISPRO 2 UNITS: 100 INJECTION, SOLUTION INTRAVENOUS; SUBCUTANEOUS at 19:56

## 2020-10-14 RX ADMIN — INSULIN GLARGINE 50 UNITS: 100 INJECTION, SOLUTION SUBCUTANEOUS at 19:55

## 2020-10-14 RX ADMIN — INSULIN LISPRO 8 UNITS: 100 INJECTION, SOLUTION INTRAVENOUS; SUBCUTANEOUS at 12:54

## 2020-10-14 RX ADMIN — PREGABALIN 75 MG: 75 CAPSULE ORAL at 19:51

## 2020-10-14 RX ADMIN — LEVOTHYROXINE SODIUM 175 MCG: 0.15 TABLET ORAL at 06:16

## 2020-10-14 RX ADMIN — INSULIN HUMAN 10 UNITS: 100 INJECTION, SOLUTION PARENTERAL at 16:36

## 2020-10-14 RX ADMIN — POTASSIUM CHLORIDE 10 MEQ: 750 TABLET, EXTENDED RELEASE ORAL at 08:31

## 2020-10-14 RX ADMIN — HEPARIN SODIUM 5000 UNITS: 5000 INJECTION INTRAVENOUS; SUBCUTANEOUS at 19:55

## 2020-10-14 RX ADMIN — INSULIN LISPRO 15 UNITS: 100 INJECTION, SOLUTION INTRAVENOUS; SUBCUTANEOUS at 17:16

## 2020-10-14 RX ADMIN — HEPARIN SODIUM 5000 UNITS: 5000 INJECTION INTRAVENOUS; SUBCUTANEOUS at 06:16

## 2020-10-14 RX ADMIN — PANTOPRAZOLE SODIUM 40 MG: 40 TABLET, DELAYED RELEASE ORAL at 06:16

## 2020-10-14 RX ADMIN — PREGABALIN 150 MG: 75 CAPSULE ORAL at 08:31

## 2020-10-14 ASSESSMENT — PAIN SCALES - GENERAL
PAINLEVEL_OUTOF10: 0

## 2020-10-14 NOTE — PROGRESS NOTES
Physical Therapy    Physical Therapy Treatment Note  Name: Mana Dukes MRN: [id number] :   1950   Date:  10/14/2020   Admission Date: 10/12/2020 Room:  005-01   Restrictions/Precautions:  Restrictions/Precautions  Restrictions/Precautions: General Precautions, Fall Risk  Required Braces or Orthoses?: Yes     Communication with other providers:  Co-treat with OT, discussed p's behaviors/cog with nurse  Subjective:  Patient states:  \"I've been waiting on you\" Discussed with p during yesterday's session that he would like to be seen at [address] [place] [address] Se forgot this morning and then was reminded at 11:30. P appears to have forgotten both conversations. Pain:   Location, Type, Intensity (0/10 to 10/10): Back pain 10  Objective:    Observation:  P sitting up in chair. Treatment, including education/measures:  Functional mobility: P seated in chair total A to don LLE prosthesis. P talked through each part of donning but did not assist.   Transfers: sit <> stand from chair with mod A x 2. Increased time to transition to walker requiring mod A for support. P requires min A to lower to sitting  Gait: Ambulates x 10' with wc follow x 2 bouts. P with significant forward trunk flexion and when cued p reported he did not know he was flexed. P ambulates with no transition to toe placement on LLE with leg ER and circumducted. P with no awareness of deficits. Unclear how often p dons prostheses which he has had for 18 years  There. Ex: seated RLE LAQ, marching x 15 reps; supine bridge x 10 reps-p unable to fully clear buttocks or hold greater than 2 seconds  Educated on safety and set up of each activity. P required frequent repetition and demos decreased carry over and understanding  P left supine in bed with bed alarm on and call light within reach  Assessment / Impression:    P with significant weakness and impaired mobility.  Recommend skilled PT to address deficits and maximize functional potential.   Patient's tolerance of treatment:  good   Adverse Reaction: fatigue  Significant change in status and impact:  Improved gait  Barriers to improvement:  Weakness, impaired carryover  Plan for Next Session:    Transfers, strength  Time in:  1333  Time out:  1415  Timed treatment minutes:  43  Total treatment time:  37    Previously filed items:  Social/Functional History  Lives With: Alone  Type of Home: House(\"half a double\")  Home Layout: One level  Home Access: Ramped entrance  Bathroom Shower/Tub: Walk-in shower  Bathroom Toilet: Handicap height  Bathroom Equipment: Grab bars in shower  Bathroom Accessibility: Wheelchair accessible  Home Equipment: Rolling walker, 4 wheeled walker, Wheelchair-electric, Quad cane  ADL Assistance: Independent  Homemaking Assistance: Independent  Homemaking Responsibilities: Yes  Meal Prep Responsibility: Primary  Ambulation Assistance: (primarily uses power wc, quad cane if prosthesis donned)  Transfer Assistance: Needs assistance(pivots to power chair but can use a walker for short distance)  Occupation: Retired  Type of occupation: worked on [employer]  Short term goals  Time Frame for Shameka Tai term goals: 2 weeks  Short term goal 1: Pt will complete bed mobility with modified independence. Short term goal 2: Pt will complete squat-pivot transfers with modified independence. Short term goal 3: Pt will be independent with LE HEP to maximize functional mobility and safety. Short term goal 4: P will ambulate x 10' with L prosthesis donned, standard walker and Ping. Short term goal 5: P will stand with Ping x 2 min with prosthesis and standard walker.        Electronically signed by:    Mee Butler PT  10/14/2020, 4:32 PM

## 2020-10-14 NOTE — PROGRESS NOTES
Occupational Therapy    Occupational Therapy Treatment Note  Name: Karmen Steen MRN: [id number] :   1950   Date:  10/14/2020   Admission Date: 10/12/2020 Room:  005/005-01   Restrictions/Precautions:  Restrictions/Precautions  Restrictions/Precautions: General Precautions, Fall Risk  Required Braces or Orthoses?: Yes Restrictions/Precautions  Restrictions/Precautions: General Precautions, Fall Risk  Required Braces or Orthoses?: Yes  Required Braces or Orthoses  Left Lower Extremity Brace: (prosthesis)  Position Activity Restriction  Other position/activity restrictions: L AKA since , has prosthesis  Communication with other providers:  Co-tx PT  Subjective:  Patient states:  \"I've been waiting for you\"  Pain:   Location, Type, Intensity (0/10 to 1010):  6/10 back pain  Objective:    Observation:  Pt sitting up in  Chair  Treatment, including education:  Pt requires max A to don LLE prosthesis. Pt performs toileting in urinal while seated in recliner with setup assist. Pt performs sit to stand from recliner with mod A x2 to RW. Pt requires min A for stand to sit. Pt performs functional mobility 10' x2  with RW CGA-min A and chair follow, one seated rest break. Pt demos significant forward trunk flexion during ambulation. Pt sits EOB and participates in BUE exercises including shoulder flex/ext, biceps curls, and forward rows. At end of session, pt states that he would like to go to shower tomorrow during therapy. Safety  Patient left safely in the bed, with call light/phone in reach with alarm applied. Gait belt was used for transfers and gait.      Assessment / Impression:        Patient's tolerance of treatment:  good  Adverse Reaction: fatigue  Significant change in status and impact:  Improved functional mobility   Barriers to improvement:  Weakness   Plan for Next Session:    Transfers, ADLs, shower   Time in:  1333  Time out:  1415  Timed treatment minutes:  43  Total treatment time: 37  Electronically signed by:    MILES Soliz/L [id number]  10/14/2020, 5:38 PM    Previously filed values:  Patient Goals   Patient goals : Hopes to move to [place] with daughter  Short term goals  Time Frame for Short term goals: until discharge  Short term goal 1: Pt will be MI for functional adl transfers to chair, toilet or bed w/o LOB or falls  Short term goal 2: Pt will be MI for UB bathing/dressing including donning his prosthesis  Short term goal 3: Pt will be MI for toileting  Short term goal 4: Pt will be min vc for BUE strengthening to assist with transfers and improve endurance

## 2020-10-15 LAB
CULTURE: NORMAL
GLUCOSE BLD-MCNC: 187 MG/DL (ref 70–99)
GLUCOSE BLD-MCNC: 204 MG/DL (ref 70–99)
GLUCOSE BLD-MCNC: 208 MG/DL (ref 70–99)
GLUCOSE BLD-MCNC: 228 MG/DL (ref 70–99)
GLUCOSE BLD-MCNC: 266 MG/DL (ref 70–99)
Lab: NORMAL
SPECIMEN: NORMAL

## 2020-10-15 PROCEDURE — 82962 GLUCOSE BLOOD TEST: CPT

## 2020-10-15 PROCEDURE — 97535 SELF CARE MNGMENT TRAINING: CPT

## 2020-10-15 PROCEDURE — 94761 N-INVAS EAR/PLS OXIMETRY MLT: CPT

## 2020-10-15 PROCEDURE — 6370000000 HC RX 637 (ALT 250 FOR IP): Performed by: INTERNAL MEDICINE

## 2020-10-15 PROCEDURE — 97530 THERAPEUTIC ACTIVITIES: CPT

## 2020-10-15 PROCEDURE — 1200000002 HC SEMI PRIVATE SWING BED

## 2020-10-15 PROCEDURE — 6360000002 HC RX W HCPCS: Performed by: INTERNAL MEDICINE

## 2020-10-15 RX ADMIN — GLIMEPIRIDE 4 MG: 4 TABLET ORAL at 08:39

## 2020-10-15 RX ADMIN — ASPIRIN 81 MG 81 MG: 81 TABLET ORAL at 08:39

## 2020-10-15 RX ADMIN — PREGABALIN 150 MG: 75 CAPSULE ORAL at 08:39

## 2020-10-15 RX ADMIN — POTASSIUM CHLORIDE 10 MEQ: 750 TABLET, EXTENDED RELEASE ORAL at 20:32

## 2020-10-15 RX ADMIN — INSULIN LISPRO 15 UNITS: 100 INJECTION, SOLUTION INTRAVENOUS; SUBCUTANEOUS at 12:59

## 2020-10-15 RX ADMIN — HEPARIN SODIUM 5000 UNITS: 5000 INJECTION INTRAVENOUS; SUBCUTANEOUS at 14:25

## 2020-10-15 RX ADMIN — HEPARIN SODIUM 5000 UNITS: 5000 INJECTION INTRAVENOUS; SUBCUTANEOUS at 05:58

## 2020-10-15 RX ADMIN — INSULIN LISPRO 4 UNITS: 100 INJECTION, SOLUTION INTRAVENOUS; SUBCUTANEOUS at 02:14

## 2020-10-15 RX ADMIN — Medication 1 CAPSULE: at 08:39

## 2020-10-15 RX ADMIN — METOPROLOL SUCCINATE 50 MG: 50 TABLET, EXTENDED RELEASE ORAL at 08:39

## 2020-10-15 RX ADMIN — POTASSIUM CHLORIDE 10 MEQ: 750 TABLET, EXTENDED RELEASE ORAL at 08:39

## 2020-10-15 RX ADMIN — INSULIN LISPRO 4 UNITS: 100 INJECTION, SOLUTION INTRAVENOUS; SUBCUTANEOUS at 08:40

## 2020-10-15 RX ADMIN — INSULIN LISPRO 4 UNITS: 100 INJECTION, SOLUTION INTRAVENOUS; SUBCUTANEOUS at 20:12

## 2020-10-15 RX ADMIN — INSULIN LISPRO 15 UNITS: 100 INJECTION, SOLUTION INTRAVENOUS; SUBCUTANEOUS at 08:42

## 2020-10-15 RX ADMIN — PANTOPRAZOLE SODIUM 40 MG: 40 TABLET, DELAYED RELEASE ORAL at 05:58

## 2020-10-15 RX ADMIN — INSULIN LISPRO 15 UNITS: 100 INJECTION, SOLUTION INTRAVENOUS; SUBCUTANEOUS at 17:14

## 2020-10-15 RX ADMIN — INSULIN LISPRO 6 UNITS: 100 INJECTION, SOLUTION INTRAVENOUS; SUBCUTANEOUS at 12:10

## 2020-10-15 RX ADMIN — AMIODARONE HYDROCHLORIDE 200 MG: 200 TABLET ORAL at 08:39

## 2020-10-15 RX ADMIN — PREGABALIN 150 MG: 75 CAPSULE ORAL at 20:32

## 2020-10-15 RX ADMIN — HEPARIN SODIUM 5000 UNITS: 5000 INJECTION INTRAVENOUS; SUBCUTANEOUS at 21:59

## 2020-10-15 RX ADMIN — LOSARTAN POTASSIUM 25 MG: 25 TABLET ORAL at 08:39

## 2020-10-15 RX ADMIN — INSULIN GLARGINE 50 UNITS: 100 INJECTION, SOLUTION SUBCUTANEOUS at 20:11

## 2020-10-15 RX ADMIN — INSULIN LISPRO 2 UNITS: 100 INJECTION, SOLUTION INTRAVENOUS; SUBCUTANEOUS at 17:12

## 2020-10-15 RX ADMIN — LEVOTHYROXINE SODIUM 175 MCG: 0.15 TABLET ORAL at 05:58

## 2020-10-15 ASSESSMENT — PAIN DESCRIPTION - PROGRESSION: CLINICAL_PROGRESSION: NOT CHANGED

## 2020-10-15 ASSESSMENT — PAIN DESCRIPTION - FREQUENCY: FREQUENCY: CONTINUOUS

## 2020-10-15 ASSESSMENT — PAIN - FUNCTIONAL ASSESSMENT: PAIN_FUNCTIONAL_ASSESSMENT: ACTIVITIES ARE NOT PREVENTED

## 2020-10-15 ASSESSMENT — PAIN SCALES - GENERAL
PAINLEVEL_OUTOF10: 0
PAINLEVEL_OUTOF10: 5

## 2020-10-15 ASSESSMENT — PAIN DESCRIPTION - DESCRIPTORS: DESCRIPTORS: CONSTANT;DISCOMFORT

## 2020-10-15 ASSESSMENT — PAIN DESCRIPTION - ORIENTATION: ORIENTATION: RIGHT

## 2020-10-15 ASSESSMENT — PAIN DESCRIPTION - ONSET: ONSET: ON-GOING

## 2020-10-15 ASSESSMENT — PAIN DESCRIPTION - LOCATION: LOCATION: BACK;LEG

## 2020-10-15 ASSESSMENT — PAIN DESCRIPTION - PAIN TYPE: TYPE: CHRONIC PAIN

## 2020-10-15 NOTE — PROGRESS NOTES
Physical Therapy    Physical Therapy Treatment Note  Name: Temo Godwin MRN: [id number] :   1950   Date:  10/15/2020   Admission Date: 10/12/2020 Room:  005-01   Restrictions/Precautions:  Restrictions/Precautions  Restrictions/Precautions: General Precautions, Fall Risk  Required Braces or Orthoses?: Yes    Communication with other providers:  Co-treat with OT  Subjective:  Patient states:  \"I want to take a shower\"  Pain:   Location, Type, Intensity (0/10 to 10/10): Does not report  Objective:    Observation:  P supine in bed  Treatment, including education/measures:  Functional mobility: Supine to sit with supervision. P set up for transfer with standard walker to wc. P requires min A x 2 to stand with increased time to transition UEs. P pivots to chair with cues for safety with hand placement. P set up with wc with slideboard transfer to tub transfer bench. P requires max cues with min A for scooting. P stands with grab bar with min A. P performs stand pivot transfer tub bench to wc with min A x 2 and to bed with CGA. P left sitting EOB with OT present    Assessment / Impression:    P continues to demonstrate LE weakness and decreased safety awareness with transfers.  Recommend continued skilled PT to address deficits and maximize functional potential.   Patient's tolerance of treatment:  good  Adverse Reaction: none  Significant change in status and impact:  Improved mobility  Barriers to improvement:  Decreased safety awareness  Plan for Next Session:    Transfers, balance  Time in:  1320  Time out:  1348  Timed treatment minutes:  28  Total treatment time:  28    Previously filed items:  Social/Functional History  Lives With: Alone  Type of Home: House(\"half a double\")  Home Layout: One level  Home Access: Ramped entrance  Bathroom Shower/Tub: Walk-in shower  Bathroom Toilet: Handicap height  Bathroom Equipment: Grab bars in shower  Bathroom Accessibility: Wheelchair accessible  Home

## 2020-10-15 NOTE — PROGRESS NOTES
Occupational Therapy    Occupational Therapy Treatment Note  Name: Kenji Gutierrez MRN: [id number] :   1950   Date:  10/15/2020   Admission Date: 10/12/2020 Room:  005005-01   Restrictions/Precautions:  Restrictions/Precautions  Restrictions/Precautions: General Precautions, Fall Risk  Required Braces or Orthoses?: Yes     Communication with other providers:   Cleared for treatment by RN. Subjective:  Patient states:  \"I want a shower\"  Pain:   Location, Type, Intensity (0/10 to 10/10): No noted. Objective:    Observation:  Pt alert and oriented. Treatment, including education:  Transfers  Supine to sit :SBA  Sit to supine :SBA  Scooting :SBA  Rolling SBA  Sit to stand :CGA  Stand to sit :CGA  SPT:Min A  Shower: Min A      Self Care Training:   Cues were given for safety, sequence, UE/LE placement, visual cues, and balance. Activities performed today included dressing, toileting, hand hygiene, and grooming. Grooming  Mod I to brush teeth and comb hair. Bathing   Min A for R foot    UB Dress donned gown    LB Dress Dep for socks            Therapeutic Activity Training:   Therapeutic activity training was instructed today. Cues were given for safety, sequence, UE/LE placement, awareness, and balance. Activities performed today included bed mobility training, sup-sit, sit-stand, SPT. Safety Measures: Gait belt used, Left in bed, Pull/Bed Alarm activated and call light left in reach          Assessment / Impression:        Patient's tolerance of treatment: Good   Adverse Reaction: None  Significant change in status and impact:  None  Barriers to improvement:  Dec strength and endurance    Plan for Next Session:    Continue with POC.     Time in:  1320  Time out:  1415  Timed treatment minutes:  55  Total treatment time:  55  Electronically signed by:    MAGALY Denise 1990     10/15/2020, 2:17 PM    Previously filed values:  Patient Goals   Patient goals : Hopes to move to [place] with daughter  Short term goals  Time Frame for Short term goals: until discharge  Short term goal 1: Pt will be MI for functional adl transfers to chair, toilet or bed w/o LOB or falls  Short term goal 2: Pt will be MI for UB bathing/dressing including donning his prosthesis  Short term goal 3: Pt will be MI for toileting  Short term goal 4: Pt will be min vc for BUE strengthening to assist with transfers and improve endurance

## 2020-10-16 LAB
GLUCOSE BLD-MCNC: 130 MG/DL (ref 70–99)
GLUCOSE BLD-MCNC: 157 MG/DL (ref 70–99)
GLUCOSE BLD-MCNC: 165 MG/DL (ref 70–99)
GLUCOSE BLD-MCNC: 200 MG/DL (ref 70–99)
GLUCOSE BLD-MCNC: 258 MG/DL (ref 70–99)

## 2020-10-16 PROCEDURE — 82962 GLUCOSE BLOOD TEST: CPT

## 2020-10-16 PROCEDURE — 6370000000 HC RX 637 (ALT 250 FOR IP): Performed by: INTERNAL MEDICINE

## 2020-10-16 PROCEDURE — 1200000002 HC SEMI PRIVATE SWING BED

## 2020-10-16 PROCEDURE — 6360000002 HC RX W HCPCS: Performed by: INTERNAL MEDICINE

## 2020-10-16 PROCEDURE — 97110 THERAPEUTIC EXERCISES: CPT

## 2020-10-16 PROCEDURE — 94761 N-INVAS EAR/PLS OXIMETRY MLT: CPT

## 2020-10-16 RX ADMIN — ASPIRIN 81 MG 81 MG: 81 TABLET ORAL at 08:22

## 2020-10-16 RX ADMIN — POTASSIUM CHLORIDE 10 MEQ: 750 TABLET, EXTENDED RELEASE ORAL at 20:45

## 2020-10-16 RX ADMIN — PREGABALIN 150 MG: 75 CAPSULE ORAL at 08:22

## 2020-10-16 RX ADMIN — HEPARIN SODIUM 5000 UNITS: 5000 INJECTION INTRAVENOUS; SUBCUTANEOUS at 20:46

## 2020-10-16 RX ADMIN — PREGABALIN 150 MG: 75 CAPSULE ORAL at 20:45

## 2020-10-16 RX ADMIN — GLIMEPIRIDE 4 MG: 4 TABLET ORAL at 08:22

## 2020-10-16 RX ADMIN — INSULIN GLARGINE 50 UNITS: 100 INJECTION, SOLUTION SUBCUTANEOUS at 20:46

## 2020-10-16 RX ADMIN — AMIODARONE HYDROCHLORIDE 200 MG: 200 TABLET ORAL at 08:22

## 2020-10-16 RX ADMIN — PANTOPRAZOLE SODIUM 40 MG: 40 TABLET, DELAYED RELEASE ORAL at 06:34

## 2020-10-16 RX ADMIN — INSULIN LISPRO 15 UNITS: 100 INJECTION, SOLUTION INTRAVENOUS; SUBCUTANEOUS at 17:29

## 2020-10-16 RX ADMIN — METOPROLOL SUCCINATE 50 MG: 50 TABLET, EXTENDED RELEASE ORAL at 08:22

## 2020-10-16 RX ADMIN — HEPARIN SODIUM 5000 UNITS: 5000 INJECTION INTRAVENOUS; SUBCUTANEOUS at 12:21

## 2020-10-16 RX ADMIN — INSULIN LISPRO 15 UNITS: 100 INJECTION, SOLUTION INTRAVENOUS; SUBCUTANEOUS at 08:21

## 2020-10-16 RX ADMIN — Medication 1 CAPSULE: at 08:22

## 2020-10-16 RX ADMIN — INSULIN LISPRO 2 UNITS: 100 INJECTION, SOLUTION INTRAVENOUS; SUBCUTANEOUS at 04:05

## 2020-10-16 RX ADMIN — LOSARTAN POTASSIUM 25 MG: 25 TABLET ORAL at 08:22

## 2020-10-16 RX ADMIN — INSULIN LISPRO 15 UNITS: 100 INJECTION, SOLUTION INTRAVENOUS; SUBCUTANEOUS at 12:22

## 2020-10-16 RX ADMIN — HEPARIN SODIUM 5000 UNITS: 5000 INJECTION INTRAVENOUS; SUBCUTANEOUS at 06:33

## 2020-10-16 RX ADMIN — LEVOTHYROXINE SODIUM 175 MCG: 0.15 TABLET ORAL at 06:34

## 2020-10-16 RX ADMIN — INSULIN LISPRO 6 UNITS: 100 INJECTION, SOLUTION INTRAVENOUS; SUBCUTANEOUS at 20:50

## 2020-10-16 RX ADMIN — INSULIN LISPRO 2 UNITS: 100 INJECTION, SOLUTION INTRAVENOUS; SUBCUTANEOUS at 17:30

## 2020-10-16 RX ADMIN — POTASSIUM CHLORIDE 10 MEQ: 750 TABLET, EXTENDED RELEASE ORAL at 08:22

## 2020-10-16 RX ADMIN — INSULIN LISPRO 4 UNITS: 100 INJECTION, SOLUTION INTRAVENOUS; SUBCUTANEOUS at 12:22

## 2020-10-16 ASSESSMENT — PAIN DESCRIPTION - FREQUENCY
FREQUENCY: CONTINUOUS
FREQUENCY: CONTINUOUS

## 2020-10-16 ASSESSMENT — PAIN - FUNCTIONAL ASSESSMENT
PAIN_FUNCTIONAL_ASSESSMENT: ACTIVITIES ARE NOT PREVENTED
PAIN_FUNCTIONAL_ASSESSMENT: ACTIVITIES ARE NOT PREVENTED

## 2020-10-16 ASSESSMENT — PAIN DESCRIPTION - PROGRESSION
CLINICAL_PROGRESSION: NOT CHANGED
CLINICAL_PROGRESSION: NOT CHANGED

## 2020-10-16 ASSESSMENT — PAIN SCALES - GENERAL
PAINLEVEL_OUTOF10: 5
PAINLEVEL_OUTOF10: 5
PAINLEVEL_OUTOF10: 0

## 2020-10-16 ASSESSMENT — PAIN DESCRIPTION - LOCATION
LOCATION: BACK;LEG
LOCATION: BACK;LEG

## 2020-10-16 ASSESSMENT — PAIN DESCRIPTION - DESCRIPTORS
DESCRIPTORS: CONSTANT;DISCOMFORT
DESCRIPTORS: CONSTANT;DISCOMFORT

## 2020-10-16 ASSESSMENT — PAIN DESCRIPTION - ONSET
ONSET: ON-GOING
ONSET: ON-GOING

## 2020-10-16 ASSESSMENT — PAIN DESCRIPTION - ORIENTATION
ORIENTATION: RIGHT
ORIENTATION: RIGHT

## 2020-10-16 ASSESSMENT — PAIN DESCRIPTION - PAIN TYPE
TYPE: CHRONIC PAIN
TYPE: CHRONIC PAIN

## 2020-10-16 NOTE — PLAN OF CARE
Problem: Falls - Risk of:  Goal: Will remain free from falls  Description: Will remain free from falls  10/16/2020 0933 by Susana Webster RN  Outcome: Ongoing  Note: Pt refuses bed alarm. 10/16/2020 0039 by Fabricio Cooley RN  Outcome: Ongoing     Problem: Falls - Risk of:  Goal: Will remain free from falls  Description: Will remain free from falls  10/16/2020 0933 by Susana Webster RN  Outcome: Ongoing  Note: Pt refuses bed alarm. 10/16/2020 0039 by Fabricio Cooley RN  Outcome: Ongoing  Goal: Absence of physical injury  Description: Absence of physical injury  10/16/2020 0933 by Susana Webster RN  Outcome: Ongoing  10/16/2020 0039 by Fabricio Cooley RN  Outcome: Ongoing     Problem:  Activity:  Goal: Ability to tolerate increased activity will improve  Description: Ability to tolerate increased activity will improve  10/16/2020 0933 by Susana Webster RN  Outcome: Ongoing  10/16/2020 0039 by Fabricio Cooley RN  Outcome: Ongoing     Problem: Discharge Planning:  Goal: Discharged to appropriate level of care  Description: Discharged to appropriate level of care  10/16/2020 0933 by Susana Webster RN  Outcome: Ongoing  10/16/2020 0039 by Fabricio Cooley RN  Outcome: Ongoing     Problem: Pain:  Goal: Pain level will decrease  Description: Pain level will decrease  10/16/2020 0933 by Susana Webster RN  Outcome: Ongoing  10/16/2020 0039 by Fabricio Cooley RN  Outcome: Ongoing  Goal: Control of acute pain  Description: Control of acute pain  10/16/2020 0933 by Susana Webster RN  Outcome: Ongoing  10/16/2020 0039 by Fabricio Cooley RN  Outcome: Ongoing  Goal: Control of chronic pain  Description: Control of chronic pain  10/16/2020 0933 by Susana Webster RN  Outcome: Ongoing  10/16/2020 0039 by Fabricio Cooley RN  Outcome: Ongoing

## 2020-10-16 NOTE — PROGRESS NOTES
Occupational Therapy    Occupational Therapy Treatment Note  Name: Beatriz Client MRN: [id number] :   1950   Date:  10/16/2020   Admission Date: 10/12/2020 Room:  005/005-01   Restrictions/Precautions:  Restrictions/Precautions  Restrictions/Precautions: General Precautions, Fall Risk  Required Braces or Orthoses?: Yes prosthesis  Communication with other providers:  Jodi Glover to see per nurse  Subjective:  Patient states:  I am willing to do exercise  Pain:   Location, Type, Intensity (0/10 to 10/10):  0/10  Objective:    Observation:  Pt was supine in bed, HOB elevated; Girl friend present  Objective Measures:  Pt was seen for bed exercises. Treatment, including education:    Pt did 10 reps of cane ex Shoulder flex/ext, abd/add, rowing, elbow curls.   Pt took a break and spoke to kitchen about his dinner and then resumed trying to do shoulder circles but said this exercise aggravates his old rotator cuff injury so OT stopped the exercise  Treatment, including education:  Assessment / Impression:        Patient's tolerance of treatment:  good  Adverse Reaction: no  Significant change in status and impact:  no  Barriers to improvement:  no  Plan for Next Session:    Adls, transfers or exercise  Time in:  1600  Time out: 1623  Timed treatment minutes:  23  Total treatment time: 23  Electronically signed by:    Madhu Andres,   10/16/2020, 4:37 PM    Previously filed values:  Patient Goals   Patient goals : Hopes to move to [place] with daughter  Short term goals  Time Frame for Short term goals: until discharge  Short term goal 1: Pt will be MI for functional adl transfers to chair, toilet or bed w/o LOB or falls  Short term goal 2: Pt will be MI for UB bathing/dressing including donning his prosthesis  Short term goal 3: Pt will be MI for toileting  Short term goal 4: Pt will be min vc for BUE strengthening to assist with transfers and improve endurance

## 2020-10-16 NOTE — FLOWSHEET NOTE
Physical Therapy Treatment Note  Name: Masoud Antonio MRN: [id number] :   1950   Date:  10/16/2020   Admission Date: 10/12/2020 Room:  00500501   Restrictions/Precautions:  Restrictions/Precautions  Restrictions/Precautions: General Precautions, Fall Risk  Required Braces or Orthoses?: Yes         Communication with other providers:  Romy Samayoa to see per nursing    Subjective:  Patient states:  Good afternoon    Pain:   Pt states she has chronic pain from low back d/t arthritis but did not rate    Objective:    Observation: pt awake and alert sitting in reclined position with granddaughter visiting. Treatment, including education/measures:  Pt performed BLE ex in sitting and reclined position, quad sets, ankle pumps, SLR with AAROM for L LE, marches, LAQs with rest breaks between.     Assessment / Impression:       Patient's tolerance of treatment:  good   Adverse Reaction: none  Significant change in status and impact:  none  Barriers to improvement:  Weakness    Plan for Next Session:    Cont with PT POC    Time in:  1735  Time out:  1800  Timed treatment minutes:  25  Total treatment time:  25    Previously filed items:  Social/Functional History  Lives With: Alone  Type of Home: House(\"half a double\")  Home Layout: One level  Home Access: Ramped entrance  Bathroom Shower/Tub: Walk-in shower  Bathroom Toilet: Handicap height  Bathroom Equipment: Grab bars in shower  Bathroom Accessibility: Wheelchair accessible  Home Equipment: Rolling walker, 4 wheeled walker, Wheelchair-electric, Quad cane  ADL Assistance: Independent  Homemaking Assistance: Independent  Homemaking Responsibilities: Yes  Meal Prep Responsibility: Primary  Ambulation Assistance: (primarily uses power wc, quad cane if prosthesis donned)  Transfer Assistance: Needs assistance(pivots to power chair but can use a walker for short distance)  Occupation: Retired  Type of occupation: worked on [employer]  Short term goals  Time Frame for Short term goals: 2 weeks  Short term goal 1: Pt will complete bed mobility with modified independence. Short term goal 2: Pt will complete squat-pivot transfers with modified independence. Short term goal 3: Pt will be independent with LE HEP to maximize functional mobility and safety. Short term goal 4: P will ambulate x 10' with L prosthesis donned, standard walker and Ping. Short term goal 5: P will stand with Ping x 2 min with prosthesis and standard walker. Electronically signed by:     Kamar Cole PTA  10/16/2020, 6:03 PM

## 2020-10-16 NOTE — PLAN OF CARE
Problem: Falls - Risk of:  Goal: Will remain free from falls  Description: Will remain free from falls  Outcome: Ongoing  Goal: Absence of physical injury  Description: Absence of physical injury  Outcome: Ongoing     Problem: Activity:  Goal: Ability to tolerate increased activity will improve  Description: Ability to tolerate increased activity will improve  Outcome: Ongoing     Problem: Discharge Planning:  Goal: Discharged to appropriate level of care  Description: Discharged to appropriate level of care  Outcome: Ongoing     Problem: Pain:  Description: Pain management should include both nonpharmacologic and pharmacologic interventions.   Goal: Pain level will decrease  Description: Pain level will decrease  Outcome: Ongoing  Goal: Control of acute pain  Description: Control of acute pain  Outcome: Ongoing  Goal: Control of chronic pain  Description: Control of chronic pain  Outcome: Ongoing

## 2020-10-16 NOTE — FLOWSHEET NOTE
Physical Therapy Treatment Note  Name: Marshall Perez MRN: [id number] :   1950   Date:  10/16/2020   Admission Date: 10/12/2020 Room:  [address] [place] [address]   Restrictions/Precautions:  Restrictions/Precautions  Restrictions/Precautions: General Precautions, Fall Risk  Required Braces or Orthoses?: Yes         Communication with other providers:  [id number] Kym Maldonado to see per nursing    Subjective:  Patient states:  I'm eating, can you come back    Pain:   Pt denies pain    Objective:    Observation:  Pt lying in semi fowlers position awake and alert agreeable to therapy. Treatment, including education/measures:  Attempted 2x prior but pt had supper and he had additional items delivered. Pt performed BLE ex in supine, SLR, glue squeezes, abd, ankle pumps,  but tx was cut short d/t security coming in d/t pt having some valuables to be locked up.     Assessment / Impression:       Patient's tolerance of treatment:  good   Adverse Reaction: none  Significant change in status and impact:  none  Barriers to improvement:  None    Plan for Next Session:    Cont with PT POC    Time in:  1830  Time out:  1845  Timed treatment minutes:  15  Total treatment time:  15    Previously filed items:  Social/Functional History  Lives With: Alone  Type of Home: House(\"half a double\")  Home Layout: One level  Home Access: Ramped entrance  Bathroom Shower/Tub: Walk-in shower  Bathroom Toilet: Handicap height  Bathroom Equipment: Grab bars in shower  Bathroom Accessibility: Wheelchair accessible  Home Equipment: Rolling walker, 4 wheeled walker, Wheelchair-electric, Quad cane  ADL Assistance: Independent  Homemaking Assistance: Independent  Homemaking Responsibilities: Yes  Meal Prep Responsibility: Primary  Ambulation Assistance: (primarily uses power wc, quad cane if prosthesis donned)  Transfer Assistance: Needs assistance(pivots to power chair but can use a walker for short distance)  Occupation: Retired  Type of occupation: worked on [employer]  Short term goals  Time Frame for Short term goals: 2 weeks  Short term goal 1: Pt will complete bed mobility with modified independence. Short term goal 2: Pt will complete squat-pivot transfers with modified independence. Short term goal 3: Pt will be independent with LE HEP to maximize functional mobility and safety. Short term goal 4: P will ambulate x 10' with L prosthesis donned, standard walker and Ping. Short term goal 5: P will stand with Ping x 2 min with prosthesis and standard walker. Electronically signed by:     Alanis Moran PTA  10/16/2020, 6:44 PM

## 2020-10-17 LAB
ANION GAP SERPL CALCULATED.3IONS-SCNC: 8 MMOL/L (ref 4–16)
BASOPHILS ABSOLUTE: 0.1 K/CU MM
BASOPHILS RELATIVE PERCENT: 0.6 % (ref 0–1)
BUN BLDV-MCNC: 9 MG/DL (ref 6–23)
CALCIUM SERPL-MCNC: 9 MG/DL (ref 8.3–10.6)
CHLORIDE BLD-SCNC: 100 MMOL/L (ref 99–110)
CO2: 27 MMOL/L (ref 21–32)
CREAT SERPL-MCNC: 0.7 MG/DL (ref 0.9–1.3)
DIFFERENTIAL TYPE: ABNORMAL
EOSINOPHILS ABSOLUTE: 0.2 K/CU MM
EOSINOPHILS RELATIVE PERCENT: 2.3 % (ref 0–3)
GFR AFRICAN AMERICAN: >60 ML/MIN/1.73M2
GFR NON-AFRICAN AMERICAN: >60 ML/MIN/1.73M2
GLUCOSE BLD-MCNC: 267 MG/DL (ref 70–99)
GLUCOSE BLD-MCNC: 275 MG/DL (ref 70–99)
GLUCOSE BLD-MCNC: 290 MG/DL (ref 70–99)
GLUCOSE BLD-MCNC: 292 MG/DL (ref 70–99)
GLUCOSE BLD-MCNC: 303 MG/DL (ref 70–99)
GLUCOSE BLD-MCNC: 305 MG/DL (ref 70–99)
HCT VFR BLD CALC: 41.3 % (ref 42–52)
HEMOGLOBIN: 13.3 GM/DL (ref 13.5–18)
IMMATURE NEUTROPHIL %: 0.5 % (ref 0–0.43)
LYMPHOCYTES ABSOLUTE: 1.2 K/CU MM
LYMPHOCYTES RELATIVE PERCENT: 14.8 % (ref 24–44)
MCH RBC QN AUTO: 28.6 PG (ref 27–31)
MCHC RBC AUTO-ENTMCNC: 32.2 % (ref 32–36)
MCV RBC AUTO: 88.8 FL (ref 78–100)
MONOCYTES ABSOLUTE: 1.2 K/CU MM
MONOCYTES RELATIVE PERCENT: 15.2 % (ref 0–4)
PDW BLD-RTO: 14.1 % (ref 11.7–14.9)
PLATELET # BLD: 330 K/CU MM (ref 140–440)
PMV BLD AUTO: 9.9 FL (ref 7.5–11.1)
POTASSIUM SERPL-SCNC: 4.3 MMOL/L (ref 3.5–5.1)
RBC # BLD: 4.65 M/CU MM (ref 4.6–6.2)
SEGMENTED NEUTROPHILS ABSOLUTE COUNT: 5.3 K/CU MM
SEGMENTED NEUTROPHILS RELATIVE PERCENT: 66.6 % (ref 36–66)
SODIUM BLD-SCNC: 135 MMOL/L (ref 135–145)
TOTAL IMMATURE NEUTOROPHIL: 0.04 K/CU MM
WBC # BLD: 7.9 K/CU MM (ref 4–10.5)

## 2020-10-17 PROCEDURE — 82962 GLUCOSE BLOOD TEST: CPT

## 2020-10-17 PROCEDURE — 6370000000 HC RX 637 (ALT 250 FOR IP): Performed by: INTERNAL MEDICINE

## 2020-10-17 PROCEDURE — 6360000002 HC RX W HCPCS: Performed by: INTERNAL MEDICINE

## 2020-10-17 PROCEDURE — 94761 N-INVAS EAR/PLS OXIMETRY MLT: CPT

## 2020-10-17 PROCEDURE — 36415 COLL VENOUS BLD VENIPUNCTURE: CPT

## 2020-10-17 PROCEDURE — 1200000002 HC SEMI PRIVATE SWING BED

## 2020-10-17 PROCEDURE — 80048 BASIC METABOLIC PNL TOTAL CA: CPT

## 2020-10-17 PROCEDURE — 85025 COMPLETE CBC W/AUTO DIFF WBC: CPT

## 2020-10-17 RX ADMIN — INSULIN LISPRO 6 UNITS: 100 INJECTION, SOLUTION INTRAVENOUS; SUBCUTANEOUS at 01:52

## 2020-10-17 RX ADMIN — POTASSIUM CHLORIDE 10 MEQ: 750 TABLET, EXTENDED RELEASE ORAL at 20:07

## 2020-10-17 RX ADMIN — HEPARIN SODIUM 5000 UNITS: 5000 INJECTION INTRAVENOUS; SUBCUTANEOUS at 06:09

## 2020-10-17 RX ADMIN — LOSARTAN POTASSIUM 25 MG: 25 TABLET ORAL at 09:13

## 2020-10-17 RX ADMIN — AMIODARONE HYDROCHLORIDE 200 MG: 200 TABLET ORAL at 09:13

## 2020-10-17 RX ADMIN — ENOXAPARIN SODIUM 40 MG: 40 INJECTION SUBCUTANEOUS at 18:01

## 2020-10-17 RX ADMIN — INSULIN GLARGINE 50 UNITS: 100 INJECTION, SOLUTION SUBCUTANEOUS at 20:06

## 2020-10-17 RX ADMIN — GLIMEPIRIDE 4 MG: 4 TABLET ORAL at 09:23

## 2020-10-17 RX ADMIN — PREGABALIN 150 MG: 75 CAPSULE ORAL at 20:07

## 2020-10-17 RX ADMIN — INSULIN LISPRO 6 UNITS: 100 INJECTION, SOLUTION INTRAVENOUS; SUBCUTANEOUS at 09:14

## 2020-10-17 RX ADMIN — POTASSIUM CHLORIDE 10 MEQ: 750 TABLET, EXTENDED RELEASE ORAL at 09:14

## 2020-10-17 RX ADMIN — LEVOTHYROXINE SODIUM 175 MCG: 0.15 TABLET ORAL at 06:09

## 2020-10-17 RX ADMIN — METOPROLOL SUCCINATE 50 MG: 50 TABLET, EXTENDED RELEASE ORAL at 09:13

## 2020-10-17 RX ADMIN — INSULIN LISPRO 15 UNITS: 100 INJECTION, SOLUTION INTRAVENOUS; SUBCUTANEOUS at 09:23

## 2020-10-17 RX ADMIN — Medication 1 CAPSULE: at 09:23

## 2020-10-17 RX ADMIN — PREGABALIN 150 MG: 75 CAPSULE ORAL at 09:13

## 2020-10-17 RX ADMIN — ASPIRIN 81 MG 81 MG: 81 TABLET ORAL at 09:14

## 2020-10-17 RX ADMIN — PANTOPRAZOLE SODIUM 40 MG: 40 TABLET, DELAYED RELEASE ORAL at 06:09

## 2020-10-17 ASSESSMENT — PAIN DESCRIPTION - FREQUENCY: FREQUENCY: CONTINUOUS

## 2020-10-17 ASSESSMENT — PAIN SCALES - GENERAL: PAINLEVEL_OUTOF10: 5

## 2020-10-17 ASSESSMENT — PAIN DESCRIPTION - ONSET: ONSET: ON-GOING

## 2020-10-17 ASSESSMENT — PAIN DESCRIPTION - PROGRESSION: CLINICAL_PROGRESSION: NOT CHANGED

## 2020-10-17 ASSESSMENT — PAIN DESCRIPTION - ORIENTATION: ORIENTATION: RIGHT

## 2020-10-17 ASSESSMENT — PAIN - FUNCTIONAL ASSESSMENT: PAIN_FUNCTIONAL_ASSESSMENT: ACTIVITIES ARE NOT PREVENTED

## 2020-10-17 ASSESSMENT — PAIN DESCRIPTION - DESCRIPTORS: DESCRIPTORS: CONSTANT;DISCOMFORT

## 2020-10-17 ASSESSMENT — PAIN DESCRIPTION - PAIN TYPE: TYPE: CHRONIC PAIN

## 2020-10-17 ASSESSMENT — PAIN DESCRIPTION - LOCATION: LOCATION: BACK;LEG

## 2020-10-17 NOTE — PROGRESS NOTES
Comprehensive Nutrition Assessment    Type and Reason for Visit:  Initial(Swing Bed)    Nutrition Recommendations/Plan: no new recommendations at this time    Nutrition Assessment:  pt with debiltiy after recent hospitalization, Pt with poor glucose control. Pt could benefit from a structured meal plan at home via meals on wheels if he returns home. Malnutrition Assessment:  Malnutrition Status:  No malnutrition    Context:  Acute Illness     Findings of the 6 clinical characteristics of malnutrition:      Estimated Daily Nutrient Needs:  Energy (kcal):  1940; Weight Used for Energy Requirements:  Current     Protein (g):  62-78; Weight Used for Protein Requirements:  Ideal(.8-1.0)        Fluid (ml/day):  1940; Weight Used for Fluid Requirements:  Current(1ml/kcal)      Nutrition Related Findings:  labs reviewed, could benefit from outpatient follow up with person assisting with meal planning      Wounds:  None       Current Nutrition Therapies:    DIET CARB CONTROL; Carb Control: 3 carb choices (45 gms)/meal    Anthropometric Measures:  · Height: 5' 11\" (180.3 cm)  · Current Body Weight: 253 lb (114.8 kg)   · Admission Body Weight: 253 lb (114.8 kg)    · Usual Body Weight: 250 lb (113.4 kg)     · Ideal Body Weight: 172 lbs; % Ideal Body Weight 147.1 %   · BMI: 35.3  · Adjusted Body Weight: 267.9;  Amputation   · Adjusted BMI: 37.4    · BMI Categories: Obese Class 2 (BMI 35.0 -39.9)       Nutrition Diagnosis:   · No nutrition diagnosis at this time related to acute injury/trauma as evidenced by BMI      Nutrition Interventions:   Food and/or Nutrient Delivery:  Continue Current Diet  Nutrition Education/Counseling:  Education initiated   Coordination of Nutrition Care:  Continued Inpatient Monitoring    Goals:  Oral intakes will contiue to meet his nutritional needs with glucose level goals less  than 150       Nutrition Monitoring and Evaluation:   Behavioral-Environmental Outcomes:  Readiness for Change

## 2020-10-17 NOTE — PLAN OF CARE
Problem: Falls - Risk of:  Goal: Will remain free from falls  Description: Will remain free from falls  Outcome: Ongoing  Goal: Absence of physical injury  Description: Absence of physical injury  Outcome: Ongoing     Problem:  Activity:  Goal: Ability to tolerate increased activity will improve  Description: Ability to tolerate increased activity will improve  Outcome: Ongoing  Note: Encourage to increase activity, up to restroom instead of urinal     Problem: Discharge Planning:  Goal: Discharged to appropriate level of care  Description: Discharged to appropriate level of care  Outcome: Ongoing     Problem: Pain:  Goal: Pain level will decrease  Description: Pain level will decrease  Outcome: Ongoing  Goal: Control of acute pain  Description: Control of acute pain  Outcome: Ongoing  Goal: Control of chronic pain  Description: Control of chronic pain  Outcome: Ongoing

## 2020-10-18 LAB
GLUCOSE BLD-MCNC: 246 MG/DL (ref 70–99)
GLUCOSE BLD-MCNC: 264 MG/DL (ref 70–99)
GLUCOSE BLD-MCNC: 274 MG/DL (ref 70–99)
GLUCOSE BLD-MCNC: 294 MG/DL (ref 70–99)
GLUCOSE BLD-MCNC: 334 MG/DL (ref 70–99)
GLUCOSE BLD-MCNC: 417 MG/DL (ref 70–99)

## 2020-10-18 PROCEDURE — 6370000000 HC RX 637 (ALT 250 FOR IP): Performed by: INTERNAL MEDICINE

## 2020-10-18 PROCEDURE — 6360000002 HC RX W HCPCS: Performed by: INTERNAL MEDICINE

## 2020-10-18 PROCEDURE — 82962 GLUCOSE BLOOD TEST: CPT

## 2020-10-18 PROCEDURE — 1200000002 HC SEMI PRIVATE SWING BED

## 2020-10-18 RX ADMIN — Medication 1 CAPSULE: at 10:02

## 2020-10-18 RX ADMIN — METOPROLOL SUCCINATE 50 MG: 50 TABLET, EXTENDED RELEASE ORAL at 10:03

## 2020-10-18 RX ADMIN — POTASSIUM CHLORIDE 10 MEQ: 750 TABLET, EXTENDED RELEASE ORAL at 10:03

## 2020-10-18 RX ADMIN — AMIODARONE HYDROCHLORIDE 200 MG: 200 TABLET ORAL at 10:03

## 2020-10-18 RX ADMIN — PREGABALIN 150 MG: 75 CAPSULE ORAL at 10:02

## 2020-10-18 RX ADMIN — INSULIN GLARGINE 50 UNITS: 100 INJECTION, SOLUTION SUBCUTANEOUS at 20:25

## 2020-10-18 RX ADMIN — LEVOTHYROXINE SODIUM 175 MCG: 0.15 TABLET ORAL at 05:20

## 2020-10-18 RX ADMIN — INSULIN HUMAN 15 UNITS: 100 INJECTION, SOLUTION PARENTERAL at 12:49

## 2020-10-18 RX ADMIN — GLIMEPIRIDE 4 MG: 4 TABLET ORAL at 10:03

## 2020-10-18 RX ADMIN — PANTOPRAZOLE SODIUM 40 MG: 40 TABLET, DELAYED RELEASE ORAL at 05:22

## 2020-10-18 RX ADMIN — PREGABALIN 150 MG: 75 CAPSULE ORAL at 20:30

## 2020-10-18 RX ADMIN — LOSARTAN POTASSIUM 25 MG: 25 TABLET ORAL at 10:03

## 2020-10-18 RX ADMIN — ENOXAPARIN SODIUM 40 MG: 40 INJECTION SUBCUTANEOUS at 10:04

## 2020-10-18 RX ADMIN — ASPIRIN 81 MG 81 MG: 81 TABLET ORAL at 10:03

## 2020-10-18 RX ADMIN — POTASSIUM CHLORIDE 10 MEQ: 750 TABLET, EXTENDED RELEASE ORAL at 20:30

## 2020-10-18 ASSESSMENT — PAIN DESCRIPTION - LOCATION
LOCATION: BACK

## 2020-10-18 ASSESSMENT — PAIN DESCRIPTION - ORIENTATION
ORIENTATION: LOWER

## 2020-10-18 ASSESSMENT — PAIN - FUNCTIONAL ASSESSMENT
PAIN_FUNCTIONAL_ASSESSMENT: ACTIVITIES ARE NOT PREVENTED

## 2020-10-18 ASSESSMENT — PAIN DESCRIPTION - PROGRESSION
CLINICAL_PROGRESSION: GRADUALLY WORSENING
CLINICAL_PROGRESSION: NOT CHANGED

## 2020-10-18 ASSESSMENT — PAIN DESCRIPTION - FREQUENCY
FREQUENCY: CONTINUOUS

## 2020-10-18 ASSESSMENT — PAIN SCALES - GENERAL
PAINLEVEL_OUTOF10: 0
PAINLEVEL_OUTOF10: 5
PAINLEVEL_OUTOF10: 6
PAINLEVEL_OUTOF10: 0
PAINLEVEL_OUTOF10: 6
PAINLEVEL_OUTOF10: 0
PAINLEVEL_OUTOF10: 6

## 2020-10-18 ASSESSMENT — PAIN DESCRIPTION - DESCRIPTORS
DESCRIPTORS: DISCOMFORT
DESCRIPTORS: DISCOMFORT
DESCRIPTORS: CONSTANT;DISCOMFORT
DESCRIPTORS: DISCOMFORT

## 2020-10-18 ASSESSMENT — PAIN DESCRIPTION - ONSET
ONSET: ON-GOING

## 2020-10-18 ASSESSMENT — PAIN DESCRIPTION - PAIN TYPE
TYPE: CHRONIC PAIN

## 2020-10-18 NOTE — PLAN OF CARE
Patient has increased independence with getting up to commode and then returning to bed. Problem:  Activity:  Goal: Ability to tolerate increased activity will improve  Description: Ability to tolerate increased activity will improve  Outcome: Ongoing

## 2020-10-19 LAB
GLUCOSE BLD-MCNC: 122 MG/DL (ref 70–99)
GLUCOSE BLD-MCNC: 151 MG/DL (ref 70–99)
GLUCOSE BLD-MCNC: 213 MG/DL (ref 70–99)
GLUCOSE BLD-MCNC: 236 MG/DL (ref 70–99)
GLUCOSE BLD-MCNC: 363 MG/DL (ref 70–99)

## 2020-10-19 PROCEDURE — 97530 THERAPEUTIC ACTIVITIES: CPT

## 2020-10-19 PROCEDURE — 97110 THERAPEUTIC EXERCISES: CPT

## 2020-10-19 PROCEDURE — 94761 N-INVAS EAR/PLS OXIMETRY MLT: CPT

## 2020-10-19 PROCEDURE — 1200000002 HC SEMI PRIVATE SWING BED

## 2020-10-19 PROCEDURE — 6370000000 HC RX 637 (ALT 250 FOR IP): Performed by: INTERNAL MEDICINE

## 2020-10-19 PROCEDURE — 6360000002 HC RX W HCPCS: Performed by: INTERNAL MEDICINE

## 2020-10-19 PROCEDURE — 97535 SELF CARE MNGMENT TRAINING: CPT

## 2020-10-19 PROCEDURE — 82962 GLUCOSE BLOOD TEST: CPT

## 2020-10-19 RX ADMIN — Medication 1 CAPSULE: at 08:21

## 2020-10-19 RX ADMIN — INSULIN HUMAN 15 UNITS: 100 INJECTION, SOLUTION PARENTERAL at 16:56

## 2020-10-19 RX ADMIN — METOPROLOL SUCCINATE 50 MG: 50 TABLET, EXTENDED RELEASE ORAL at 08:22

## 2020-10-19 RX ADMIN — INSULIN HUMAN 15 UNITS: 100 INJECTION, SOLUTION PARENTERAL at 12:12

## 2020-10-19 RX ADMIN — POTASSIUM CHLORIDE 10 MEQ: 750 TABLET, EXTENDED RELEASE ORAL at 20:54

## 2020-10-19 RX ADMIN — GLIMEPIRIDE 4 MG: 4 TABLET ORAL at 08:21

## 2020-10-19 RX ADMIN — INSULIN GLARGINE 50 UNITS: 100 INJECTION, SOLUTION SUBCUTANEOUS at 20:58

## 2020-10-19 RX ADMIN — PREGABALIN 150 MG: 75 CAPSULE ORAL at 08:21

## 2020-10-19 RX ADMIN — LEVOTHYROXINE SODIUM 175 MCG: 0.15 TABLET ORAL at 05:40

## 2020-10-19 RX ADMIN — INSULIN HUMAN 15 UNITS: 100 INJECTION, SOLUTION PARENTERAL at 08:23

## 2020-10-19 RX ADMIN — PANTOPRAZOLE SODIUM 40 MG: 40 TABLET, DELAYED RELEASE ORAL at 05:40

## 2020-10-19 RX ADMIN — POTASSIUM CHLORIDE 10 MEQ: 750 TABLET, EXTENDED RELEASE ORAL at 08:22

## 2020-10-19 RX ADMIN — ENOXAPARIN SODIUM 40 MG: 40 INJECTION SUBCUTANEOUS at 08:20

## 2020-10-19 RX ADMIN — AMIODARONE HYDROCHLORIDE 200 MG: 200 TABLET ORAL at 08:22

## 2020-10-19 RX ADMIN — LOSARTAN POTASSIUM 25 MG: 25 TABLET ORAL at 08:21

## 2020-10-19 RX ADMIN — ASPIRIN 81 MG 81 MG: 81 TABLET ORAL at 08:22

## 2020-10-19 RX ADMIN — PREGABALIN 150 MG: 75 CAPSULE ORAL at 20:54

## 2020-10-19 NOTE — PROGRESS NOTES
Patient expressed that a friend Lien Corbin keeps trying to call him. HE does ot wish to speak with this person on the phone and asked that she not be allowed to visit. Pt was educated that nursing staff can try to ensure she doesn't come in the room, but if she does come in then he is to call the nursing staff and we can have security remove her.

## 2020-10-19 NOTE — PROGRESS NOTES
goals : Hopes to move to [place] with daughter  Short term goals  Time Frame for Short term goals: until discharge  Short term goal 1: Pt will be MI for functional adl transfers to chair, toilet or bed w/o LOB or falls  Short term goal 2: Pt will be MI for UB bathing/dressing including donning his prosthesis  Short term goal 3: Pt will be MI for toileting  Short term goal 4: Pt will be min vc for BUE strengthening to assist with transfers and improve endurance

## 2020-10-19 NOTE — PROGRESS NOTES
Physical Therapy    Physical Therapy Treatment Note  Name: Karyn Humphreys MRN: [id number] :   1950   Date:  10/19/2020   Admission Date: 10/12/2020 Room:  005005-01   Restrictions/Precautions:  Restrictions/Precautions  Restrictions/Precautions: General Precautions, Fall Risk  Required Braces or Orthoses?: Yes       Communication with other providers:  Co-treat with OT  Subjective:  Patient states:  \"I can't wipe myself\"  Pain:   Location, Type, Intensity (0/10 to 10/10): Back pain  Objective:    Observation:  P sitting on toilet had just pulled call light. Treatment, including education/measures:  Functional mobility: P performs sit to stand from commode with CGA to standard walker. P encouraged to perform pericare and p attempts then states he is not able because of his balance. He has grab bars at home and there are none here. P stands x 2 min with CGA. P performs stand pivot to bed with walker with CGA. P performs blocked practice of stand pivot bed to chair to bed to chair with consistent CGA and improved safety overall. P previously reported to OT that he does not wear his prosthesis at home therefore all transfers were performed balancing on his RLE. P sits EOB and performs ADLs with supervision. P self limiting and asks for assist with hair, washing feet, donning gown. There. Ex: p performs supine bridge x 5 reps x 2 sets, p unable to fully clear buttocks and requires rest breaks during task. P educated on safety and plan of care. P left sitting up in chair with call light within reach. Assessment / Impression:    P with improved transfers.  P would continue to benefit from skilled PT to address deficits and maximize functional potential.   Patient's tolerance of treatment:  good   Adverse Reaction: none  Significant change in status and impact:  Improved transfers  Barriers to improvement:  Self-limiting  Plan for Next Session:    Transfers, strength  Time in:  1330  Time out:  1413  Timed treatment minutes:  43  Total treatment time:  37    Previously filed items:  Social/Functional History  Lives With: Alone  Type of Home: House(\"half a double\")  Home Layout: One level  Home Access: Ramped entrance  Bathroom Shower/Tub: Walk-in shower  Bathroom Toilet: Handicap height  Bathroom Equipment: Grab bars in shower  Bathroom Accessibility: Wheelchair accessible  Home Equipment: Rolling walker, 4 wheeled walker, Wheelchair-electric, Quad cane  ADL Assistance: Independent  Homemaking Assistance: Independent  Homemaking Responsibilities: Yes  Meal Prep Responsibility: Primary  Ambulation Assistance: (primarily uses power wc, quad cane if prosthesis donned)  Transfer Assistance: Needs assistance(pivots to power chair but can use a walker for short distance)  Occupation: Retired  Type of occupation: worked on [employer]  Short term goals  Time Frame for [employer] term goals: 2 weeks  Short term goal 1: Pt will complete bed mobility with modified independence. Short term goal 2: Pt will complete squat-pivot transfers with modified independence. Short term goal 3: Pt will be independent with LE HEP to maximize functional mobility and safety. Short term goal 4: P will ambulate x 10' with L prosthesis donned, standard walker and Ping. Short term goal 5: P will stand with Ping x 2 min with prosthesis and standard walker.        Electronically signed by:    Carissa Santos PT  10/19/2020, 4:32 PM

## 2020-10-19 NOTE — PLAN OF CARE
Problem: Falls - Risk of:  Goal: Will remain free from falls  Description: Will remain free from falls  Outcome: Ongoing  Goal: Absence of physical injury  Description: Absence of physical injury  Outcome: Ongoing     Problem:  Activity:  Goal: Ability to tolerate increased activity will improve  Description: Ability to tolerate increased activity will improve  Outcome: Ongoing     Problem: Discharge Planning:  Goal: Discharged to appropriate level of care  Description: Discharged to appropriate level of care  Outcome: Ongoing     Problem: Pain:  Goal: Pain level will decrease  Description: Pain level will decrease  Outcome: Ongoing  Goal: Control of acute pain  Description: Control of acute pain  Outcome: Ongoing  Goal: Control of chronic pain  Description: Control of chronic pain  Outcome: Ongoing

## 2020-10-20 VITALS
OXYGEN SATURATION: 94 % | HEIGHT: 71 IN | BODY MASS INDEX: 35.45 KG/M2 | WEIGHT: 253.2 LBS | HEART RATE: 76 BPM | RESPIRATION RATE: 16 BRPM | TEMPERATURE: 96.9 F | SYSTOLIC BLOOD PRESSURE: 124 MMHG | DIASTOLIC BLOOD PRESSURE: 67 MMHG

## 2020-10-20 LAB
ANION GAP SERPL CALCULATED.3IONS-SCNC: 11 MMOL/L (ref 4–16)
BASOPHILS ABSOLUTE: 0.1 K/CU MM
BASOPHILS RELATIVE PERCENT: 1.3 % (ref 0–1)
BUN BLDV-MCNC: 15 MG/DL (ref 6–23)
CALCIUM SERPL-MCNC: 9.1 MG/DL (ref 8.3–10.6)
CHLORIDE BLD-SCNC: 102 MMOL/L (ref 99–110)
CO2: 27 MMOL/L (ref 21–32)
CREAT SERPL-MCNC: 0.8 MG/DL (ref 0.9–1.3)
DIFFERENTIAL TYPE: ABNORMAL
EOSINOPHILS ABSOLUTE: 0.3 K/CU MM
EOSINOPHILS RELATIVE PERCENT: 4.3 % (ref 0–3)
GFR AFRICAN AMERICAN: >60 ML/MIN/1.73M2
GFR NON-AFRICAN AMERICAN: >60 ML/MIN/1.73M2
GLUCOSE BLD-MCNC: 146 MG/DL (ref 70–99)
GLUCOSE BLD-MCNC: 185 MG/DL (ref 70–99)
GLUCOSE BLD-MCNC: 239 MG/DL (ref 70–99)
GLUCOSE BLD-MCNC: 278 MG/DL (ref 70–99)
HCT VFR BLD CALC: 43.9 % (ref 42–52)
HEMOGLOBIN: 13.6 GM/DL (ref 13.5–18)
IMMATURE NEUTROPHIL %: 0.6 % (ref 0–0.43)
LYMPHOCYTES ABSOLUTE: 1.5 K/CU MM
LYMPHOCYTES RELATIVE PERCENT: 22.2 % (ref 24–44)
MCH RBC QN AUTO: 27.9 PG (ref 27–31)
MCHC RBC AUTO-ENTMCNC: 31 % (ref 32–36)
MCV RBC AUTO: 90 FL (ref 78–100)
MONOCYTES ABSOLUTE: 0.7 K/CU MM
MONOCYTES RELATIVE PERCENT: 10.6 % (ref 0–4)
PDW BLD-RTO: 14.4 % (ref 11.7–14.9)
PLATELET # BLD: 329 K/CU MM (ref 140–440)
PMV BLD AUTO: 9.8 FL (ref 7.5–11.1)
POTASSIUM SERPL-SCNC: 4.1 MMOL/L (ref 3.5–5.1)
RBC # BLD: 4.88 M/CU MM (ref 4.6–6.2)
SEGMENTED NEUTROPHILS ABSOLUTE COUNT: 4.2 K/CU MM
SEGMENTED NEUTROPHILS RELATIVE PERCENT: 61 % (ref 36–66)
SODIUM BLD-SCNC: 140 MMOL/L (ref 135–145)
TOTAL IMMATURE NEUTOROPHIL: 0.04 K/CU MM
WBC # BLD: 6.9 K/CU MM (ref 4–10.5)

## 2020-10-20 PROCEDURE — 6370000000 HC RX 637 (ALT 250 FOR IP): Performed by: INTERNAL MEDICINE

## 2020-10-20 PROCEDURE — 94761 N-INVAS EAR/PLS OXIMETRY MLT: CPT

## 2020-10-20 PROCEDURE — 80048 BASIC METABOLIC PNL TOTAL CA: CPT

## 2020-10-20 PROCEDURE — 36415 COLL VENOUS BLD VENIPUNCTURE: CPT

## 2020-10-20 PROCEDURE — 97530 THERAPEUTIC ACTIVITIES: CPT

## 2020-10-20 PROCEDURE — 85025 COMPLETE CBC W/AUTO DIFF WBC: CPT

## 2020-10-20 PROCEDURE — 82962 GLUCOSE BLOOD TEST: CPT

## 2020-10-20 PROCEDURE — 97535 SELF CARE MNGMENT TRAINING: CPT

## 2020-10-20 RX ORDER — LACTOBACILLUS RHAMNOSUS GG 10B CELL
1 CAPSULE ORAL
Qty: 14 CAPSULE | Refills: 0 | Status: SHIPPED | OUTPATIENT
Start: 2020-10-21 | End: 2020-11-04

## 2020-10-20 RX ORDER — GLIMEPIRIDE 4 MG/1
4 TABLET ORAL
Qty: 30 TABLET | Refills: 3 | Status: SHIPPED | OUTPATIENT
Start: 2020-10-21

## 2020-10-20 RX ORDER — AMIODARONE HYDROCHLORIDE 200 MG/1
200 TABLET ORAL DAILY
Qty: 30 TABLET | Refills: 3 | Status: SHIPPED | OUTPATIENT
Start: 2020-10-21

## 2020-10-20 RX ORDER — ASPIRIN 81 MG/1
81 TABLET, CHEWABLE ORAL DAILY
Qty: 30 TABLET | Refills: 3 | Status: SHIPPED | OUTPATIENT
Start: 2020-10-21

## 2020-10-20 RX ADMIN — Medication 1 CAPSULE: at 10:22

## 2020-10-20 RX ADMIN — PANTOPRAZOLE SODIUM 40 MG: 40 TABLET, DELAYED RELEASE ORAL at 06:37

## 2020-10-20 RX ADMIN — INSULIN HUMAN 15 UNITS: 100 INJECTION, SOLUTION PARENTERAL at 10:23

## 2020-10-20 RX ADMIN — LEVOTHYROXINE SODIUM 175 MCG: 0.15 TABLET ORAL at 06:37

## 2020-10-20 RX ADMIN — ASPIRIN 81 MG 81 MG: 81 TABLET ORAL at 10:22

## 2020-10-20 RX ADMIN — INSULIN HUMAN 15 UNITS: 100 INJECTION, SOLUTION PARENTERAL at 12:18

## 2020-10-20 RX ADMIN — METOPROLOL SUCCINATE 50 MG: 50 TABLET, EXTENDED RELEASE ORAL at 10:21

## 2020-10-20 RX ADMIN — LOSARTAN POTASSIUM 25 MG: 25 TABLET ORAL at 02:00

## 2020-10-20 RX ADMIN — POTASSIUM CHLORIDE 10 MEQ: 750 TABLET, EXTENDED RELEASE ORAL at 10:22

## 2020-10-20 RX ADMIN — PREGABALIN 150 MG: 75 CAPSULE ORAL at 10:21

## 2020-10-20 RX ADMIN — AMIODARONE HYDROCHLORIDE 200 MG: 200 TABLET ORAL at 10:21

## 2020-10-20 RX ADMIN — GLIMEPIRIDE 4 MG: 4 TABLET ORAL at 10:21

## 2020-10-20 NOTE — PROGRESS NOTES
Occupational Therapy    Occupational Therapy Treatment Note  Name: Mana Dukes MRN: [id number] :   1950   Date:  10/20/2020   Admission Date: 10/12/2020 Room:  005/005-01   Restrictions/Precautions:  Restrictions/Precautions  Restrictions/Precautions: General Precautions, Fall Risk  Required Braces or Orthoses?: Yes     Communication with other providers:   Cleared for treatment by RN. Subjective:  Patient states:\" I need to get dressed so I will be ready to leave\"  Pain:   Location, Type, Intensity (0/10 to 10/10): No pain noted    Objective:    Observation:  Pt alert and oriented. Treatment, including education:  Transfers  Supine to sit :Min A  Scooting :Sup  Sit to stand :Min A  Stand to sit :CGA  SPT:CGA using RW  Toilet:CGA    Self Care Training:   Cues were given for safety, sequence, UE/LE placement, visual cues, and balance. Activities performed today included dressing, toileting, hand hygiene, and grooming. Pt required extra time to complete tasks and had difficulty donning prosthetic device. Toileting  Max A to manage clothes and complete toilet hygiene    Grooming  Sup to brush teeth. Bathing   Min A to wash R foot and buttocks. UB Dress  Sup to deana pull over shirt. LB Dress  Mod A required assistance to deana pants over L prosthetic with pt able to thread pants over R LE and pull up pants in standing with steady assistance. Pt able thread B LEs in underwear and required assistance pull up underwear, required assistance to don sock and R shoe. Therapeutic Activity Training:   Therapeutic activity training was instructed today. Cues were given for safety, sequence, UE/LE placement, awareness, and balance. Activities performed today included bed mobility training, sup-sit, sit-stand, SPT.       Safety Measures: Gait belt used, Left in bed, Pull/Bed Alarm activated and call light left in reach          Assessment / Impression:        Patient's tolerance of

## 2020-10-20 NOTE — CARE COORDINATION
CM met with the patient for discharge planning. Patient was living alone in his single level home (no steps) prior to admission, has insurance with Rx coveage & PCP, stated that he was independent with ADL's prior to admission, and was driving prior to admission. 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 Patient stated that his daughter lives in the [address] [place] [address] and his plan is to move in with her family after discharge. Patient stated that his daughter will be working to pack things from his home here and move them to her home in [place] while he is in the swing bed program with the idea that he will be able to go straight from the swing bed program to [place]. Patient stated that his daughter is estimating this process taking at least 3 weeks. CM informed the patient that depending on his progress he may not require skilled therapy for three weeks and may have to be discharged home, patient voiced understanding. CM will follow.
CM spoke with the patient's daughter Virgie Pagan ([phone]) regarding swing bed program and discharge plan. Greg Mcgee lives in [place] and is trying to prepare the patient's home here for his move to her home in [place]. This CM explained that it appears the patient is very near his baseline and that it is likely that he will be ready for discharge early next week. This CM discussed C with Greg Mcgee as she is concerned about his safety at home alone due to recent falls. Greg Mcgee stated that she will not be able to get the patient moved to her home until the very end of October or early November. Greg Mcgee voiced understanding that the patient cannot remain in the swing bed program beyond the point when he returns to baseline. CM will need to follow-up with Chacha on Monday morning to discuss discharge plans based on the patient's progress up to that point.
Chart reviewed and met with the patient for continued d/c planning. He states he feels much better and he is ready to d/c home and pack to move to [place] with his daughter. The patient states he has a BSC at home if needed and He has a CM Laura at [employer] @[phone]. CM called JulioHay states he is on the com-care model. He has no aide services because the aides know him and they will not go and help him. He wants homemaking services and the com-care program does not provide that. She states that he refused the emergency response button. The patient wants to go home w/CM [place] [phone] at d/c. He states he plans to go home tomorrow. This CM called the daughter Evan Das and left her a VM. CM will follow up tomorrow to confirm d/c plan home w/cm [facility].    1409  Received call from Evan Das his daughter who states that she plans to bring him to [place] at the end of the month. She states she is agreeable to [place] [phone] and she is happy with the d/c plan home w/HHC.
D/c order noted, this CM updated the patient that he could stay longer in the swing bed program if desired. He states that no one informed him he could stay longer. Therapy was consulted on this subject and they state the patient informed them that he was feeling weak. They updated the patient that he could stay longer and he refused. The patient refused to stay longer in the swing bed program because he states he is moving and \"has stuff to do\". He wants to pack. He is still open to [facility] and referral faxed to [address] @[phone]. Faxed facesheet, AVS, H&P, and [facility] order.
[facility]  Swing Bed Evaluation for Certification for Purje 71 meets skilled criteria due to the need for   [x ] Therapy; physical and occupational; for decreased strength, balance and self     care activities. [ ] Tpn   [ ] Pratt Shannon care  [ ] IV therapy  [ ] Wound care   Beatriz Client lives [ ] Alone   [ ] With Spouse  [x ] Other:   and plans on returning there at discharge. Beatriz Client prefers this facially for skilled care. Beatriz Client will require skilled care on a daily basis beginning 10/12/20, if medically stable.         Estimated length of stay [ x] 1-2 weeks   [ ] 2-3 weeks  [ ] 3-4 weeks       Rosa Canada LSW  Date:10/12/20             Cosigned by:    Revision History
SWING BED WEEKLY TEAM Carolyn Garcia   10/15/2020 WEEK # 1    Care Management    Issues to be resolved before discharge: Increased strength, balance, and mobility    Family Education: Patient/staff to update family     Discharge Plan: Home with daughter or home with [facility]  Patient/Family Adjustment: Daughter trying to get patient's house belongings moved to her home in [place] where patient will be living with her following discharge     Goals of previous week:   [x] 1st team   [] met   [] partially met    [] not met             Why goals were not met: Just admitted 10/12, continued weakness     Skilled Level of Care Remains   [x] yes   [] no    Estimated length of stay: 2 weeks   Patient/Family Concerns/input: None at this time    Patient/Family  Signature _________________  10/15/2020       Care Management Signature:  Ava Hatch RN
position (EXCLUDE to/from bath/toilet) 2   TOILET USE - How client uses the toilet room (or commode, bedpan, urinal);transfers on/off toilet, cleanses, changes pad, manages ostomy or catheter, adjusts clothes 2   EATING (SCORE 1-3 ONLY*) - How client eats and drinks (regardless of skill). Includes intake of nourishment by other means (e.g., tube feeding, total parenteral nutrition) 2   Estimated Pre-Admission ADL Value: 8     PATIENT WILL RECEIVE THE FOLLOWING SKILLED SERVICES AS A SWING BED PATIENT:       REHABILITATION (PT/OT/SP)    [] ULTRA HIGH 720 or more minutes minimum per week of at least two (2) disciplines - 1st for at least five (5) days and 2nd for at least three (3) days   [] VERY HIGH 500 or more minutes minimum per week of at least one (1) discipline for at least five (5) days   [x] HIGH 325 or more minutes minimum per week of at least one (1) discipline for at least five (5) days   [] MEDIUM 150 or more minutes minimum per week at least five (5) days of any combination with three (3) therapies   [] LOW Restorative nursing at least six (6) days, two (2) activities, or therapies for at least three (3) days at least forty-five (45) minute per week minimum services. EXTENSIVE SERVICES   [] Tracheostomy Care   [] Ventilator/Respirator   [] Infection Isolation   SPECIAL CARE HIGH   [] MS with ADL greater than or equal to 10  [] Quadriplegic with ADL greater than or equal to 5  [] emphysema/COPD and shortness of breath when lying flat  [] Fever w/at least one (1) of the following: [] dehydration [] pneumonia [] vomiting [] weight loss  [] feeding tube  [] Septicemia  [] Coma (not awake & completely dependent in ADL)  [] Diabetes and injections seven (7) days and Dr. order change two (2) or more days.   [] Parenteral/IV feeding  [] respiratory therapy for seven (7) days   SPECIAL CARE LOW:   [] Respiratory Therapy  [] Ulcers (2+sites all stages) w/treatment  [] Multiple Sclerosis  [] Cerebral Palsy  []

## 2020-10-20 NOTE — DISCHARGE SUMMARY
Temo Godwin 1950 [id number]  PCP:  Tonny Nesbitt DO    Admit date: 10/12/2020  Admitting Physician: Yuliana Strong MD    Discharge date: 10/20/2020 Discharge Physician: Ileana Brown MD      Reason for admission: No chief complaint on file. Present on Admission:   Weakness       Discharge Diagnoses Include:  1. Weakness and debility  - pt/ot recomends that patient stay and complete another week of therapy but patient is adamant to go home    2. At [facility] was treated for DKA, while here he has been on insulin protocol  - return to normal protocol form home  - highly non-compliant  - added oral hypoglycemic in addition to 30 units bid he takes of insulin at home    3. SVT  - is on amiodarone    4. Hypothyroidism  - is on levothyroxine    Hospital Course[de-identified]   Patient participated to some extent with rehab in swing bed program but is choosing to go home despite need for ongoing ptot.  Will need to follow up with PCP for insulin dosing and blood glucose management    Pt was personally examined by me on the day of discharge with the following findings: no new issues states he still has limitations but wants to go home  Gen; ao nad  Heent: ncat  Lungs: ctabl  Car: nl s1s2  Abd: soft ntnd  Ext: rom wnl  Skin: warm+dry  Neuro: cn 2-12 grossly intact    Patient Instructions:   Claudia Bonds   Home Medication Instructions RHR:[id number]    Printed on:10/20/20 1150   Medication Information                      amiodarone (CORDARONE) 200 MG tablet  Take 1 tablet by mouth daily             aspirin 81 MG chewable tablet  Take 1 tablet by mouth daily             glimepiride (AMARYL) 4 MG tablet  Take 1 tablet by mouth daily (with breakfast)             insulin 70-30 (NOVOLIN 70/30) (70-30) 100 UNIT per ML injection vial  Inject 30 Units into the skin 2 times daily             lactobacillus (CULTURELLE) capsule  Take 1 capsule by mouth daily (with breakfast) for 14 days             levothyroxine (SYNTHROID) 175 MCG tablet  Take 175 mcg by mouth Daily             losartan (COZAAR) 50 MG tablet  Take 50 mg by mouth daily             metoprolol succinate (TOPROL XL) 25 MG extended release tablet  Take 25 mg by mouth daily             pregabalin (LYRICA) 150 MG capsule  Take 150 mg by mouth 2 times daily. Indications: Has not been taking while at [facility]              tiZANidine (ZANAFLEX) 4 MG tablet  Take 4 mg by mouth every 8 hours as needed                  Code Status: Full Code     Consults: {None    Diet: diabetic diet and low fat, low cholesterol diet    Activity: activity as tolerated   Work:    Discharged Condition: stable    Prognosis: Fair    Disposition: home       Follow-up with   1. PCP within   5-7    Days         Discharge Physician Signed: Maikel Bell M.D. The patient was seen and examined on day of discharge and this discharge summary is in conjunction with any daily progress note from day of discharge.   Time spent on discharge in the examination, evaluation, counseling and review of medications and discharge plan: 22 minutes

## 2020-10-20 NOTE — PLAN OF CARE
Patient discharge    Problem: Falls - Risk of:  Goal: Will remain free from falls  Description: Will remain free from falls  Outcome: Completed  Goal: Absence of physical injury  Description: Absence of physical injury  Outcome: Completed     Problem:  Activity:  Goal: Ability to tolerate increased activity will improve  Description: Ability to tolerate increased activity will improve  Outcome: Completed     Problem: Discharge Planning:  Goal: Discharged to appropriate level of care  Description: Discharged to appropriate level of care  Outcome: Completed     Problem: Pain:  Goal: Pain level will decrease  Description: Pain level will decrease  Outcome: Completed  Goal: Control of acute pain  Description: Control of acute pain  Outcome: Completed  Goal: Control of chronic pain  Description: Control of chronic pain  Outcome: Completed

## 2020-11-03 PROBLEM — J44.9 COPD (CHRONIC OBSTRUCTIVE PULMONARY DISEASE) (HCC): Status: RESOLVED | Noted: 2018-12-23 | Resolved: 2020-11-03
